# Patient Record
Sex: MALE | Race: WHITE | Employment: OTHER | URBAN - METROPOLITAN AREA
[De-identification: names, ages, dates, MRNs, and addresses within clinical notes are randomized per-mention and may not be internally consistent; named-entity substitution may affect disease eponyms.]

---

## 2017-08-09 PROBLEM — Z79.1 NSAID LONG-TERM USE: Status: ACTIVE | Noted: 2017-08-09

## 2017-08-09 PROBLEM — I34.1 MITRAL VALVE PROLAPSE: Status: ACTIVE | Noted: 2017-08-09

## 2017-08-09 PROBLEM — I10 ESSENTIAL HYPERTENSION: Status: ACTIVE | Noted: 2017-08-09

## 2017-08-09 PROBLEM — R80.0 ISOLATED PROTEINURIA: Status: ACTIVE | Noted: 2017-08-09

## 2017-08-09 PROBLEM — M19.049 PRIMARY OSTEOARTHRITIS OF HAND: Status: ACTIVE | Noted: 2017-08-09

## 2017-08-09 PROBLEM — N18.30 CKD (CHRONIC KIDNEY DISEASE), STAGE III (HCC): Status: ACTIVE | Noted: 2017-08-09

## 2017-08-11 ENCOUNTER — HOSPITAL ENCOUNTER (OUTPATIENT)
Dept: ULTRASOUND IMAGING | Age: 72
Discharge: HOME | End: 2017-08-11
Payer: MEDICARE

## 2017-08-11 ENCOUNTER — HOSPITAL ENCOUNTER (OUTPATIENT)
Age: 72
Discharge: HOME | End: 2017-08-11
Payer: MEDICARE

## 2017-08-11 DIAGNOSIS — N18.30 CKD (CHRONIC KIDNEY DISEASE), STAGE III (HCC): ICD-10-CM

## 2017-08-11 DIAGNOSIS — R53.83 FATIGUE, UNSPECIFIED TYPE: ICD-10-CM

## 2017-08-11 LAB
ALBUMIN SERPL-MCNC: 3.8 G/DL (ref 3.5–5.2)
ANION GAP SERPL CALCULATED.3IONS-SCNC: 12 MMOL/L (ref 9–17)
BUN BLDV-MCNC: 23 MG/DL (ref 8–23)
BUN/CREAT BLD: ABNORMAL (ref 9–20)
CALCIUM SERPL-MCNC: 9.2 MG/DL (ref 8.6–10.4)
CHLORIDE BLD-SCNC: 101 MMOL/L (ref 98–107)
CO2: 24 MMOL/L (ref 20–31)
CREAT SERPL-MCNC: 1.28 MG/DL (ref 0.7–1.2)
CREATININE URINE: 40.3 MG/DL (ref 39–259)
FREE KAPPA/LAMBDA RATIO: 1.78 (ref 0.26–1.65)
GFR AFRICAN AMERICAN: >60 ML/MIN
GFR NON-AFRICAN AMERICAN: 55 ML/MIN
GFR SERPL CREATININE-BSD FRML MDRD: ABNORMAL ML/MIN/{1.73_M2}
GFR SERPL CREATININE-BSD FRML MDRD: ABNORMAL ML/MIN/{1.73_M2}
GLUCOSE BLD-MCNC: 104 MG/DL (ref 70–99)
HAV IGM SER IA-ACNC: NONREACTIVE
HCT VFR BLD CALC: 42.9 % (ref 41–53)
HEMOGLOBIN: 14.3 G/DL (ref 13.5–17.5)
HEPATITIS B CORE IGM ANTIBODY: NONREACTIVE
HEPATITIS B SURFACE ANTIGEN: NONREACTIVE
HEPATITIS C ANTIBODY: NONREACTIVE
KAPPA FREE LIGHT CHAINS QNT: 4.14 MG/DL (ref 0.37–1.94)
LAMBDA FREE LIGHT CHAINS QNT: 2.33 MG/DL (ref 0.57–2.63)
MCH RBC QN AUTO: 31.3 PG (ref 26–34)
MCHC RBC AUTO-ENTMCNC: 33.3 G/DL (ref 31–37)
MCV RBC AUTO: 94 FL (ref 80–100)
PDW BLD-RTO: 13.5 % (ref 11.5–14.9)
PHOSPHORUS: 4.1 MG/DL (ref 2.5–4.5)
PLATELET # BLD: 205 K/UL (ref 150–450)
PMV BLD AUTO: 9.6 FL (ref 6–12)
POTASSIUM SERPL-SCNC: 4.1 MMOL/L (ref 3.7–5.3)
PTH INTACT: 43.2 PG/ML (ref 15–65)
RBC # BLD: 4.56 M/UL (ref 4.5–5.9)
RHEUMATOID FACTOR: <10 IU/ML
SODIUM BLD-SCNC: 137 MMOL/L (ref 135–144)
TOTAL PROTEIN, URINE: 68 MG/DL
WBC # BLD: 8.1 K/UL (ref 3.5–11)

## 2017-08-11 PROCEDURE — 80048 BASIC METABOLIC PNL TOTAL CA: CPT

## 2017-08-11 PROCEDURE — 86038 ANTINUCLEAR ANTIBODIES: CPT

## 2017-08-11 PROCEDURE — 83516 IMMUNOASSAY NONANTIBODY: CPT

## 2017-08-11 PROCEDURE — 82570 ASSAY OF URINE CREATININE: CPT

## 2017-08-11 PROCEDURE — 80074 ACUTE HEPATITIS PANEL: CPT

## 2017-08-11 PROCEDURE — 86431 RHEUMATOID FACTOR QUANT: CPT

## 2017-08-11 PROCEDURE — 85027 COMPLETE CBC AUTOMATED: CPT

## 2017-08-11 PROCEDURE — 84155 ASSAY OF PROTEIN SERUM: CPT

## 2017-08-11 PROCEDURE — 82040 ASSAY OF SERUM ALBUMIN: CPT

## 2017-08-11 PROCEDURE — 84100 ASSAY OF PHOSPHORUS: CPT

## 2017-08-11 PROCEDURE — 83883 ASSAY NEPHELOMETRY NOT SPEC: CPT

## 2017-08-11 PROCEDURE — 84156 ASSAY OF PROTEIN URINE: CPT

## 2017-08-11 PROCEDURE — 76770 US EXAM ABDO BACK WALL COMP: CPT

## 2017-08-11 PROCEDURE — 36415 COLL VENOUS BLD VENIPUNCTURE: CPT

## 2017-08-11 PROCEDURE — 84165 PROTEIN E-PHORESIS SERUM: CPT

## 2017-08-11 PROCEDURE — 83970 ASSAY OF PARATHORMONE: CPT

## 2017-08-14 LAB
ANCA MYELOPEROXIDASE: 72 AU/ML
ANCA PROTEINASE 3: 6 AU/ML
ANTI-NUCLEAR ANTIBODY (ANA): NEGATIVE

## 2017-08-15 LAB
ALBUMIN (CALCULATED): 4.1 G/DL (ref 3.2–5.2)
ALBUMIN PERCENT: 65 % (ref 45–65)
ALPHA 1 PERCENT: 3 % (ref 3–6)
ALPHA 2 PERCENT: 10 % (ref 6–13)
ALPHA-1-GLOBULIN: 0.2 G/DL (ref 0.1–0.4)
ALPHA-2-GLOBULIN: 0.6 G/DL (ref 0.5–0.9)
BETA GLOBULIN: 0.6 G/DL (ref 0.5–1.1)
BETA PERCENT: 10 % (ref 11–19)
GAMMA GLOBULIN %: 11 % (ref 9–20)
GAMMA GLOBULIN: 0.7 G/DL (ref 0.5–1.5)
PATHOLOGIST: ABNORMAL
PROTEIN ELECTROPHORESIS, SERUM: ABNORMAL
TOTAL PROT. SUM,%: 99 % (ref 98–102)
TOTAL PROT. SUM: 6.2 G/DL (ref 6.3–8.2)
TOTAL PROTEIN: 6.2 G/DL (ref 6.4–8.3)

## 2017-11-06 ENCOUNTER — HOSPITAL ENCOUNTER (OUTPATIENT)
Dept: GENERAL RADIOLOGY | Age: 72
Discharge: HOME | End: 2017-11-06
Payer: MEDICARE

## 2017-11-06 ENCOUNTER — HOSPITAL ENCOUNTER (OUTPATIENT)
Age: 72
Discharge: HOME | End: 2017-11-06
Payer: MEDICARE

## 2017-11-06 DIAGNOSIS — S39.012A BACK STRAIN, INITIAL ENCOUNTER: ICD-10-CM

## 2017-11-06 PROCEDURE — 72220 X-RAY EXAM SACRUM TAILBONE: CPT

## 2017-11-06 PROCEDURE — 72100 X-RAY EXAM L-S SPINE 2/3 VWS: CPT

## 2017-11-14 ENCOUNTER — HOSPITAL ENCOUNTER (OUTPATIENT)
Age: 72
Discharge: HOME | End: 2017-11-14
Payer: MEDICARE

## 2017-11-14 DIAGNOSIS — N18.30 CKD (CHRONIC KIDNEY DISEASE), STAGE III (HCC): ICD-10-CM

## 2017-11-14 LAB
ALBUMIN SERPL-MCNC: 3.6 G/DL (ref 3.5–5.2)
ANION GAP SERPL CALCULATED.3IONS-SCNC: 13 MMOL/L (ref 9–17)
BUN BLDV-MCNC: 28 MG/DL (ref 8–23)
BUN/CREAT BLD: ABNORMAL (ref 9–20)
CALCIUM SERPL-MCNC: 9.2 MG/DL (ref 8.6–10.4)
CHLORIDE BLD-SCNC: 101 MMOL/L (ref 98–107)
CO2: 25 MMOL/L (ref 20–31)
CREAT SERPL-MCNC: 1.28 MG/DL (ref 0.7–1.2)
CREATININE URINE: 107 MG/DL (ref 39–259)
GFR AFRICAN AMERICAN: >60 ML/MIN
GFR NON-AFRICAN AMERICAN: 55 ML/MIN
GFR SERPL CREATININE-BSD FRML MDRD: ABNORMAL ML/MIN/{1.73_M2}
GFR SERPL CREATININE-BSD FRML MDRD: ABNORMAL ML/MIN/{1.73_M2}
GLUCOSE BLD-MCNC: 112 MG/DL (ref 70–99)
HCT VFR BLD CALC: 40.9 % (ref 41–53)
HEMOGLOBIN: 13.9 G/DL (ref 13.5–17.5)
MCH RBC QN AUTO: 31.9 PG (ref 26–34)
MCHC RBC AUTO-ENTMCNC: 34 G/DL (ref 31–37)
MCV RBC AUTO: 93.8 FL (ref 80–100)
PDW BLD-RTO: 13.6 % (ref 11.5–14.9)
PLATELET # BLD: 193 K/UL (ref 150–450)
PMV BLD AUTO: 9.4 FL (ref 6–12)
POTASSIUM SERPL-SCNC: 4.5 MMOL/L (ref 3.7–5.3)
RBC # BLD: 4.36 M/UL (ref 4.5–5.9)
SODIUM BLD-SCNC: 139 MMOL/L (ref 135–144)
TOTAL PROTEIN, URINE: 80 MG/DL
WBC # BLD: 9 K/UL (ref 3.5–11)

## 2017-11-14 PROCEDURE — 36415 COLL VENOUS BLD VENIPUNCTURE: CPT

## 2017-11-14 PROCEDURE — 82040 ASSAY OF SERUM ALBUMIN: CPT

## 2017-11-14 PROCEDURE — 80048 BASIC METABOLIC PNL TOTAL CA: CPT

## 2017-11-14 PROCEDURE — 82570 ASSAY OF URINE CREATININE: CPT

## 2017-11-14 PROCEDURE — 84156 ASSAY OF PROTEIN URINE: CPT

## 2017-11-14 PROCEDURE — 85027 COMPLETE CBC AUTOMATED: CPT

## 2017-11-21 ENCOUNTER — OFFICE VISIT (OUTPATIENT)
Dept: UROLOGY | Age: 72
End: 2017-11-21
Payer: MEDICARE

## 2017-11-21 VITALS
WEIGHT: 210.4 LBS | TEMPERATURE: 97.7 F | DIASTOLIC BLOOD PRESSURE: 73 MMHG | HEIGHT: 71 IN | HEART RATE: 71 BPM | SYSTOLIC BLOOD PRESSURE: 124 MMHG | BODY MASS INDEX: 29.46 KG/M2

## 2017-11-21 DIAGNOSIS — N13.8 BPH WITH OBSTRUCTION/LOWER URINARY TRACT SYMPTOMS: ICD-10-CM

## 2017-11-21 DIAGNOSIS — N40.1 BPH WITH OBSTRUCTION/LOWER URINARY TRACT SYMPTOMS: ICD-10-CM

## 2017-11-21 DIAGNOSIS — Z12.5 ENCOUNTER FOR SCREENING FOR MALIGNANT NEOPLASM OF PROSTATE: ICD-10-CM

## 2017-11-21 DIAGNOSIS — R35.1 NOCTURIA: Primary | ICD-10-CM

## 2017-11-21 PROCEDURE — 99213 OFFICE O/P EST LOW 20 MIN: CPT | Performed by: UROLOGY

## 2017-11-21 ASSESSMENT — ENCOUNTER SYMPTOMS
EYE REDNESS: 0
COLOR CHANGE: 0
NAUSEA: 0
SHORTNESS OF BREATH: 0
WHEEZING: 0
BACK PAIN: 0
EYE PAIN: 0
ABDOMINAL PAIN: 0
VOMITING: 0
COUGH: 0

## 2017-11-21 NOTE — PROGRESS NOTES
creatinine:  Lab Results   Component Value Date    BUN 28 (H) 11/14/2017     Lab Results   Component Value Date    CREATININE 1.28 (H) 11/14/2017       Additional Lab/Culture results: none    Imaging Reviewed during this Office Visit: none    (results were independently reviewed by physician and radiology report verified)    PAST MEDICAL, FAMILY AND SOCIAL HISTORY UPDATE:  Past Medical History:   Diagnosis Date    Arthritis     Aspirin long-term use     Back pain     Chronic kidney disease     CKD (chronic kidney disease), stage III 8/9/2017    Baseline creatinine 1.41.5. Proteinuria reported by primary physician. Workup in progress.  Essential hypertension 8/9/2017    Family history of prostate problems     Hiatal hernia     HTN (hypertension)     Isolated proteinuria 8/9/2017    Nonnephrotic, being quantified.     Mitral valve prolapse 8/9/2017    MVP (mitral valve prolapse)     Neck pain     NSAID long-term use 8/9/2017    Primary osteoarthritis of hand 8/9/2017    Urination, excessive at night      Past Surgical History:   Procedure Laterality Date    ANKLE SURGERY      COLONOSCOPY      INGUINAL HERNIA REPAIR  12/1/08    [facility]    LUMBAR [employer] SURGERY      NECK SURGERY      SPINE SURGERY       Family History   Problem Relation Age of Onset    Family history unknown: Yes     Outpatient Prescriptions Marked as Taking for the 11/21/17 encounter (Office Visit) with Suraj Hackett MD   Medication Sig Dispense Refill    spironolactone (ALDACTONE) 25 MG tablet Take 0.5 tablets by mouth daily 90 tablet 3    lisinopril (PRINIVIL;ZESTRIL) 20 MG tablet Take 20 mg by mouth 2 times daily      azelastine (OPTIVAR) 0.05 % ophthalmic solution Place 1 drop into both eyes daily      Turmeric 500 MG CAPS Take by mouth daily      atorvastatin (LIPITOR) 20 MG tablet Take 20 mg by mouth daily      tamsulosin (FLOMAX) 0.4 MG capsule Take 1 capsule by mouth daily 90 capsule 3    aspirin 81 MG tablet obstruction/lower urinary tract symptoms    3. Encounter for screening for malignant neoplasm of prostate           Plan:         Overall, voiding is stable. Restrict fluids before bed. Continue Flomax. F/U in 1 year. Return in about 1 year (around 11/21/2018). Prescriptions Ordered:  No orders of the defined types were placed in this encounter.      Orders Placed:  Orders Placed This Encounter   Procedures    PSA, Diagnostic     Standing Status:   Future     Standing Expiration Date:   11/21/2018          Ed Andrea MD

## 2018-02-05 DIAGNOSIS — R35.1 NOCTURIA: ICD-10-CM

## 2018-02-05 DIAGNOSIS — R35.0 URINARY FREQUENCY: ICD-10-CM

## 2018-02-05 DIAGNOSIS — N40.1 BENIGN NON-NODULAR PROSTATIC HYPERPLASIA WITH LOWER URINARY TRACT SYMPTOMS: ICD-10-CM

## 2018-02-05 RX ORDER — TAMSULOSIN HYDROCHLORIDE 0.4 MG/1
CAPSULE ORAL
Qty: 90 CAPSULE | Refills: 3 | Status: SHIPPED | OUTPATIENT
Start: 2018-02-05 | End: 2019-03-12 | Stop reason: SDUPTHER

## 2018-03-08 ENCOUNTER — HOSPITAL ENCOUNTER (OUTPATIENT)
Age: 73
Discharge: HOME | End: 2018-03-08
Payer: MEDICARE

## 2018-03-08 DIAGNOSIS — N18.30 CKD (CHRONIC KIDNEY DISEASE), STAGE III (HCC): ICD-10-CM

## 2018-03-08 LAB
ALBUMIN SERPL-MCNC: 3.7 G/DL (ref 3.5–5.2)
CREATININE URINE: 84.8 MG/DL (ref 39–259)
HCT VFR BLD CALC: 40.1 % (ref 41–53)
HEMOGLOBIN: 13.6 G/DL (ref 13.5–17.5)
MCH RBC QN AUTO: 32.1 PG (ref 26–34)
MCHC RBC AUTO-ENTMCNC: 33.8 G/DL (ref 31–37)
MCV RBC AUTO: 95.2 FL (ref 80–100)
NRBC AUTOMATED: ABNORMAL PER 100 WBC
PDW BLD-RTO: 13.4 % (ref 11.5–14.9)
PHOSPHORUS: 3.4 MG/DL (ref 2.5–4.5)
PLATELET # BLD: 218 K/UL (ref 150–450)
PMV BLD AUTO: 9.5 FL (ref 6–12)
PTH INTACT: 72.95 PG/ML (ref 15–65)
RBC # BLD: 4.22 M/UL (ref 4.5–5.9)
TOTAL PROTEIN, URINE: 128 MG/DL
WBC # BLD: 10.3 K/UL (ref 3.5–11)

## 2018-03-08 PROCEDURE — 84100 ASSAY OF PHOSPHORUS: CPT

## 2018-03-08 PROCEDURE — 82570 ASSAY OF URINE CREATININE: CPT

## 2018-03-08 PROCEDURE — 83970 ASSAY OF PARATHORMONE: CPT

## 2018-03-08 PROCEDURE — 36415 COLL VENOUS BLD VENIPUNCTURE: CPT

## 2018-03-08 PROCEDURE — 82040 ASSAY OF SERUM ALBUMIN: CPT

## 2018-03-08 PROCEDURE — 84156 ASSAY OF PROTEIN URINE: CPT

## 2018-03-08 PROCEDURE — 85027 COMPLETE CBC AUTOMATED: CPT

## 2018-06-26 ENCOUNTER — HOSPITAL ENCOUNTER (OUTPATIENT)
Age: 73
Discharge: HOME | End: 2018-06-26
Payer: MEDICARE

## 2018-06-26 DIAGNOSIS — N18.30 CKD (CHRONIC KIDNEY DISEASE), STAGE III (HCC): ICD-10-CM

## 2018-06-26 LAB
ALBUMIN SERPL-MCNC: 3.8 G/DL (ref 3.5–5.2)
CREATININE URINE: 107 MG/DL (ref 39–259)
HCT VFR BLD CALC: 41.4 % (ref 41–53)
HEMOGLOBIN: 14 G/DL (ref 13.5–17.5)
MCH RBC QN AUTO: 31.4 PG (ref 26–34)
MCHC RBC AUTO-ENTMCNC: 33.8 G/DL (ref 31–37)
MCV RBC AUTO: 92.9 FL (ref 80–100)
NRBC AUTOMATED: ABNORMAL PER 100 WBC
PDW BLD-RTO: 14.1 % (ref 11.5–14.9)
PHOSPHORUS: 4.1 MG/DL (ref 2.5–4.5)
PLATELET # BLD: 194 K/UL (ref 150–450)
PMV BLD AUTO: 9.4 FL (ref 6–12)
RBC # BLD: 4.46 M/UL (ref 4.5–5.9)
TOTAL PROTEIN, URINE: 130 MG/DL
WBC # BLD: 7.7 K/UL (ref 3.5–11)

## 2018-06-26 PROCEDURE — 85027 COMPLETE CBC AUTOMATED: CPT

## 2018-06-26 PROCEDURE — 83970 ASSAY OF PARATHORMONE: CPT

## 2018-06-26 PROCEDURE — 36415 COLL VENOUS BLD VENIPUNCTURE: CPT

## 2018-06-26 PROCEDURE — 82040 ASSAY OF SERUM ALBUMIN: CPT

## 2018-06-26 PROCEDURE — 84156 ASSAY OF PROTEIN URINE: CPT

## 2018-06-26 PROCEDURE — 82570 ASSAY OF URINE CREATININE: CPT

## 2018-06-26 PROCEDURE — 84100 ASSAY OF PHOSPHORUS: CPT

## 2018-06-27 LAB — PTH INTACT: 53.14 PG/ML (ref 15–65)

## 2018-07-16 ENCOUNTER — HOSPITAL ENCOUNTER (OUTPATIENT)
Age: 73
Discharge: HOME | End: 2018-07-18
Payer: MEDICARE

## 2018-07-16 ENCOUNTER — HOSPITAL ENCOUNTER (OUTPATIENT)
Dept: GENERAL RADIOLOGY | Age: 73
Discharge: HOME | End: 2018-07-18
Payer: MEDICARE

## 2018-07-16 DIAGNOSIS — M13.851: ICD-10-CM

## 2018-07-16 PROCEDURE — 73501 X-RAY EXAM HIP UNI 1 VIEW: CPT

## 2018-08-27 ENCOUNTER — HOSPITAL ENCOUNTER (OUTPATIENT)
Age: 73
Discharge: HOME | End: 2018-08-27
Payer: MEDICARE

## 2018-08-27 DIAGNOSIS — N18.30 CKD (CHRONIC KIDNEY DISEASE), STAGE III (HCC): ICD-10-CM

## 2018-08-27 LAB
ALBUMIN SERPL-MCNC: 3.9 G/DL (ref 3.5–5.2)
CREATININE URINE: 68.7 MG/DL (ref 39–259)
HCT VFR BLD CALC: 41.5 % (ref 41–53)
HEMOGLOBIN: 14.1 G/DL (ref 13.5–17.5)
MCH RBC QN AUTO: 31.6 PG (ref 26–34)
MCHC RBC AUTO-ENTMCNC: 34 G/DL (ref 31–37)
MCV RBC AUTO: 92.9 FL (ref 80–100)
NRBC AUTOMATED: ABNORMAL PER 100 WBC
PDW BLD-RTO: 13.6 % (ref 11.5–14.9)
PHOSPHORUS: 3.6 MG/DL (ref 2.5–4.5)
PLATELET # BLD: 249 K/UL (ref 150–450)
PMV BLD AUTO: 8.7 FL (ref 6–12)
PTH INTACT: 37.64 PG/ML (ref 15–65)
RBC # BLD: 4.47 M/UL (ref 4.5–5.9)
TOTAL PROTEIN, URINE: 96 MG/DL
WBC # BLD: 11 K/UL (ref 3.5–11)

## 2018-08-27 PROCEDURE — 85027 COMPLETE CBC AUTOMATED: CPT

## 2018-08-27 PROCEDURE — 84156 ASSAY OF PROTEIN URINE: CPT

## 2018-08-27 PROCEDURE — 83970 ASSAY OF PARATHORMONE: CPT

## 2018-08-27 PROCEDURE — 36415 COLL VENOUS BLD VENIPUNCTURE: CPT

## 2018-08-27 PROCEDURE — 84100 ASSAY OF PHOSPHORUS: CPT

## 2018-08-27 PROCEDURE — 82570 ASSAY OF URINE CREATININE: CPT

## 2018-08-27 PROCEDURE — 82040 ASSAY OF SERUM ALBUMIN: CPT

## 2018-09-06 ENCOUNTER — HOSPITAL ENCOUNTER (OUTPATIENT)
Age: 73
Discharge: HOME | End: 2018-09-06
Payer: MEDICARE

## 2018-09-06 LAB
ANION GAP SERPL CALCULATED.3IONS-SCNC: 11 MMOL/L (ref 9–17)
BUN BLDV-MCNC: 23 MG/DL (ref 8–23)
BUN/CREAT BLD: ABNORMAL (ref 9–20)
CALCIUM SERPL-MCNC: 9.4 MG/DL (ref 8.6–10.4)
CHLORIDE BLD-SCNC: 105 MMOL/L (ref 98–107)
CO2: 25 MMOL/L (ref 20–31)
CREAT SERPL-MCNC: 1.48 MG/DL (ref 0.7–1.2)
GFR AFRICAN AMERICAN: 56 ML/MIN
GFR NON-AFRICAN AMERICAN: 46 ML/MIN
GFR SERPL CREATININE-BSD FRML MDRD: ABNORMAL ML/MIN/{1.73_M2}
GFR SERPL CREATININE-BSD FRML MDRD: ABNORMAL ML/MIN/{1.73_M2}
GLUCOSE BLD-MCNC: 99 MG/DL (ref 70–99)
POTASSIUM SERPL-SCNC: 4.5 MMOL/L (ref 3.7–5.3)
SODIUM BLD-SCNC: 141 MMOL/L (ref 135–144)

## 2018-09-06 PROCEDURE — 80048 BASIC METABOLIC PNL TOTAL CA: CPT

## 2018-10-26 ENCOUNTER — HOSPITAL ENCOUNTER (OUTPATIENT)
Age: 73
Discharge: HOME | End: 2018-10-26
Payer: MEDICARE

## 2018-10-26 DIAGNOSIS — N18.30 CKD (CHRONIC KIDNEY DISEASE), STAGE III (HCC): ICD-10-CM

## 2018-10-26 LAB
ALBUMIN SERPL-MCNC: 3.6 G/DL (ref 3.5–5.2)
ANION GAP SERPL CALCULATED.3IONS-SCNC: 9 MMOL/L (ref 9–17)
BUN BLDV-MCNC: 20 MG/DL (ref 8–23)
BUN/CREAT BLD: ABNORMAL (ref 9–20)
CALCIUM SERPL-MCNC: 9 MG/DL (ref 8.6–10.4)
CHLORIDE BLD-SCNC: 105 MMOL/L (ref 98–107)
CO2: 26 MMOL/L (ref 20–31)
CREAT SERPL-MCNC: 1.36 MG/DL (ref 0.7–1.2)
CREATININE URINE: 43 MG/DL (ref 39–259)
GFR AFRICAN AMERICAN: >60 ML/MIN
GFR NON-AFRICAN AMERICAN: 51 ML/MIN
GFR SERPL CREATININE-BSD FRML MDRD: ABNORMAL ML/MIN/{1.73_M2}
GFR SERPL CREATININE-BSD FRML MDRD: ABNORMAL ML/MIN/{1.73_M2}
GLUCOSE BLD-MCNC: 117 MG/DL (ref 70–99)
HCT VFR BLD CALC: 39.3 % (ref 41–53)
HEMOGLOBIN: 12.9 G/DL (ref 13.5–17.5)
MCH RBC QN AUTO: 30.6 PG (ref 26–34)
MCHC RBC AUTO-ENTMCNC: 32.8 G/DL (ref 31–37)
MCV RBC AUTO: 93.4 FL (ref 80–100)
NRBC AUTOMATED: ABNORMAL PER 100 WBC
PDW BLD-RTO: 13.5 % (ref 11.5–14.9)
PHOSPHORUS: 3.8 MG/DL (ref 2.5–4.5)
PLATELET # BLD: 198 K/UL (ref 150–450)
PMV BLD AUTO: 8.7 FL (ref 6–12)
POTASSIUM SERPL-SCNC: 4.6 MMOL/L (ref 3.7–5.3)
PTH INTACT: 63.79 PG/ML (ref 15–65)
RBC # BLD: 4.21 M/UL (ref 4.5–5.9)
SODIUM BLD-SCNC: 140 MMOL/L (ref 135–144)
TOTAL PROTEIN, URINE: 59 MG/DL
WBC # BLD: 8.7 K/UL (ref 3.5–11)

## 2018-10-26 PROCEDURE — 82570 ASSAY OF URINE CREATININE: CPT

## 2018-10-26 PROCEDURE — 80048 BASIC METABOLIC PNL TOTAL CA: CPT

## 2018-10-26 PROCEDURE — 82040 ASSAY OF SERUM ALBUMIN: CPT

## 2018-10-26 PROCEDURE — 84156 ASSAY OF PROTEIN URINE: CPT

## 2018-10-26 PROCEDURE — 83970 ASSAY OF PARATHORMONE: CPT

## 2018-10-26 PROCEDURE — 36415 COLL VENOUS BLD VENIPUNCTURE: CPT

## 2018-10-26 PROCEDURE — 85027 COMPLETE CBC AUTOMATED: CPT

## 2018-10-26 PROCEDURE — 84100 ASSAY OF PHOSPHORUS: CPT

## 2018-11-29 ENCOUNTER — HOSPITAL ENCOUNTER (OUTPATIENT)
Age: 73
Discharge: HOME | End: 2018-11-29
Payer: MEDICARE

## 2018-11-29 DIAGNOSIS — N18.30 CKD (CHRONIC KIDNEY DISEASE), STAGE III (HCC): ICD-10-CM

## 2018-11-29 LAB
ALBUMIN SERPL-MCNC: 3.7 G/DL (ref 3.5–5.2)
ANION GAP SERPL CALCULATED.3IONS-SCNC: 11 MMOL/L (ref 9–17)
BUN BLDV-MCNC: 19 MG/DL (ref 8–23)
BUN/CREAT BLD: ABNORMAL (ref 9–20)
CALCIUM SERPL-MCNC: 9.1 MG/DL (ref 8.6–10.4)
CHLORIDE BLD-SCNC: 107 MMOL/L (ref 98–107)
CO2: 25 MMOL/L (ref 20–31)
CREAT SERPL-MCNC: 1.43 MG/DL (ref 0.7–1.2)
CREATININE URINE: 88.8 MG/DL (ref 39–259)
GFR AFRICAN AMERICAN: 59 ML/MIN
GFR NON-AFRICAN AMERICAN: 48 ML/MIN
GFR SERPL CREATININE-BSD FRML MDRD: ABNORMAL ML/MIN/{1.73_M2}
GFR SERPL CREATININE-BSD FRML MDRD: ABNORMAL ML/MIN/{1.73_M2}
GLUCOSE BLD-MCNC: 81 MG/DL (ref 70–99)
HCT VFR BLD CALC: 41.6 % (ref 41–53)
HEMOGLOBIN: 13.8 G/DL (ref 13.5–17.5)
MCH RBC QN AUTO: 31.2 PG (ref 26–34)
MCHC RBC AUTO-ENTMCNC: 33.2 G/DL (ref 31–37)
MCV RBC AUTO: 94.2 FL (ref 80–100)
NRBC AUTOMATED: ABNORMAL PER 100 WBC
PDW BLD-RTO: 13.7 % (ref 11.5–14.9)
PHOSPHORUS: 3.7 MG/DL (ref 2.5–4.5)
PLATELET # BLD: 221 K/UL (ref 150–450)
PMV BLD AUTO: 9.5 FL (ref 6–12)
POTASSIUM SERPL-SCNC: 4.8 MMOL/L (ref 3.7–5.3)
PTH INTACT: 99.77 PG/ML (ref 15–65)
RBC # BLD: 4.42 M/UL (ref 4.5–5.9)
SODIUM BLD-SCNC: 143 MMOL/L (ref 135–144)
TOTAL PROTEIN, URINE: 136 MG/DL
WBC # BLD: 10.6 K/UL (ref 3.5–11)

## 2018-11-29 PROCEDURE — 36415 COLL VENOUS BLD VENIPUNCTURE: CPT

## 2018-11-29 PROCEDURE — 83970 ASSAY OF PARATHORMONE: CPT

## 2018-11-29 PROCEDURE — 80048 BASIC METABOLIC PNL TOTAL CA: CPT

## 2018-11-29 PROCEDURE — 84156 ASSAY OF PROTEIN URINE: CPT

## 2018-11-29 PROCEDURE — 84100 ASSAY OF PHOSPHORUS: CPT

## 2018-11-29 PROCEDURE — 82040 ASSAY OF SERUM ALBUMIN: CPT

## 2018-11-29 PROCEDURE — 82570 ASSAY OF URINE CREATININE: CPT

## 2018-11-29 PROCEDURE — 85027 COMPLETE CBC AUTOMATED: CPT

## 2018-12-27 ENCOUNTER — HOSPITAL ENCOUNTER (OUTPATIENT)
Age: 73
Discharge: HOME | End: 2018-12-27
Payer: MEDICARE

## 2018-12-27 DIAGNOSIS — N18.30 CKD (CHRONIC KIDNEY DISEASE), STAGE III (HCC): ICD-10-CM

## 2018-12-27 LAB
ALBUMIN SERPL-MCNC: 3.6 G/DL (ref 3.5–5.2)
ANION GAP SERPL CALCULATED.3IONS-SCNC: 8 MMOL/L (ref 9–17)
BUN BLDV-MCNC: 22 MG/DL (ref 8–23)
BUN/CREAT BLD: ABNORMAL (ref 9–20)
CALCIUM SERPL-MCNC: 9 MG/DL (ref 8.6–10.4)
CHLORIDE BLD-SCNC: 103 MMOL/L (ref 98–107)
CO2: 25 MMOL/L (ref 20–31)
CREAT SERPL-MCNC: 1.38 MG/DL (ref 0.7–1.2)
CREATININE URINE: 33.4 MG/DL (ref 39–259)
GFR AFRICAN AMERICAN: >60 ML/MIN
GFR NON-AFRICAN AMERICAN: 50 ML/MIN
GFR SERPL CREATININE-BSD FRML MDRD: ABNORMAL ML/MIN/{1.73_M2}
GFR SERPL CREATININE-BSD FRML MDRD: ABNORMAL ML/MIN/{1.73_M2}
GLUCOSE BLD-MCNC: 78 MG/DL (ref 70–99)
HCT VFR BLD CALC: 40.5 % (ref 41–53)
HEMOGLOBIN: 13.4 G/DL (ref 13.5–17.5)
MCH RBC QN AUTO: 31 PG (ref 26–34)
MCHC RBC AUTO-ENTMCNC: 33.1 G/DL (ref 31–37)
MCV RBC AUTO: 93.8 FL (ref 80–100)
NRBC AUTOMATED: ABNORMAL PER 100 WBC
PDW BLD-RTO: 13.5 % (ref 11.5–14.9)
PHOSPHORUS: 4.1 MG/DL (ref 2.5–4.5)
PLATELET # BLD: 224 K/UL (ref 150–450)
PMV BLD AUTO: 9 FL (ref 6–12)
POTASSIUM SERPL-SCNC: 4.9 MMOL/L (ref 3.7–5.3)
PTH INTACT: 60.53 PG/ML (ref 15–65)
RBC # BLD: 4.32 M/UL (ref 4.5–5.9)
SODIUM BLD-SCNC: 136 MMOL/L (ref 135–144)
TOTAL PROTEIN, URINE: 43 MG/DL
WBC # BLD: 8.7 K/UL (ref 3.5–11)

## 2018-12-27 PROCEDURE — 84156 ASSAY OF PROTEIN URINE: CPT

## 2018-12-27 PROCEDURE — 80048 BASIC METABOLIC PNL TOTAL CA: CPT

## 2018-12-27 PROCEDURE — 83970 ASSAY OF PARATHORMONE: CPT

## 2018-12-27 PROCEDURE — 36415 COLL VENOUS BLD VENIPUNCTURE: CPT

## 2018-12-27 PROCEDURE — 85027 COMPLETE CBC AUTOMATED: CPT

## 2018-12-27 PROCEDURE — 82570 ASSAY OF URINE CREATININE: CPT

## 2018-12-27 PROCEDURE — 82040 ASSAY OF SERUM ALBUMIN: CPT

## 2018-12-27 PROCEDURE — 84100 ASSAY OF PHOSPHORUS: CPT

## 2019-01-21 ENCOUNTER — HOSPITAL ENCOUNTER (OUTPATIENT)
Age: 74
Discharge: HOME | End: 2019-01-21
Payer: MEDICARE

## 2019-01-21 DIAGNOSIS — N40.0 BENIGN PROSTATIC HYPERPLASIA WITHOUT LOWER URINARY TRACT SYMPTOMS: ICD-10-CM

## 2019-01-21 DIAGNOSIS — N40.0 BENIGN PROSTATIC HYPERPLASIA WITHOUT LOWER URINARY TRACT SYMPTOMS: Primary | ICD-10-CM

## 2019-01-21 LAB — PROSTATE SPECIFIC ANTIGEN: 1.33 UG/L

## 2019-01-21 PROCEDURE — 84153 ASSAY OF PSA TOTAL: CPT

## 2019-01-21 PROCEDURE — 36415 COLL VENOUS BLD VENIPUNCTURE: CPT

## 2019-01-22 ENCOUNTER — OFFICE VISIT (OUTPATIENT)
Dept: UROLOGY | Age: 74
End: 2019-01-22
Payer: MEDICARE

## 2019-01-22 VITALS
TEMPERATURE: 97.7 F | WEIGHT: 224.6 LBS | BODY MASS INDEX: 31.44 KG/M2 | SYSTOLIC BLOOD PRESSURE: 147 MMHG | HEIGHT: 71 IN | DIASTOLIC BLOOD PRESSURE: 88 MMHG | HEART RATE: 62 BPM

## 2019-01-22 DIAGNOSIS — N40.1 BPH WITH OBSTRUCTION/LOWER URINARY TRACT SYMPTOMS: ICD-10-CM

## 2019-01-22 DIAGNOSIS — Z12.5 PROSTATE CANCER SCREENING: ICD-10-CM

## 2019-01-22 DIAGNOSIS — N13.8 BPH WITH OBSTRUCTION/LOWER URINARY TRACT SYMPTOMS: ICD-10-CM

## 2019-01-22 DIAGNOSIS — R35.1 NOCTURIA: Primary | ICD-10-CM

## 2019-01-22 PROCEDURE — 99214 OFFICE O/P EST MOD 30 MIN: CPT | Performed by: UROLOGY

## 2019-01-22 ASSESSMENT — ENCOUNTER SYMPTOMS
COLOR CHANGE: 0
COUGH: 0
VOMITING: 0
ABDOMINAL PAIN: 0
NAUSEA: 0
EYE PAIN: 0
EYE REDNESS: 0
SHORTNESS OF BREATH: 0
BACK PAIN: 0
WHEEZING: 0

## 2019-02-24 ENCOUNTER — HOSPITAL ENCOUNTER (EMERGENCY)
Age: 74
Discharge: HOME | End: 2019-02-24
Attending: EMERGENCY MEDICINE
Payer: MEDICARE

## 2019-02-24 ENCOUNTER — APPOINTMENT (OUTPATIENT)
Dept: GENERAL RADIOLOGY | Age: 74
End: 2019-02-24
Payer: MEDICARE

## 2019-02-24 VITALS
TEMPERATURE: 97.6 F | HEIGHT: 71 IN | DIASTOLIC BLOOD PRESSURE: 70 MMHG | WEIGHT: 220 LBS | HEART RATE: 62 BPM | BODY MASS INDEX: 30.8 KG/M2 | SYSTOLIC BLOOD PRESSURE: 153 MMHG | OXYGEN SATURATION: 95 % | RESPIRATION RATE: 20 BRPM

## 2019-02-24 DIAGNOSIS — S93.401A SPRAIN OF RIGHT ANKLE, UNSPECIFIED LIGAMENT, INITIAL ENCOUNTER: Primary | ICD-10-CM

## 2019-02-24 PROCEDURE — 73610 X-RAY EXAM OF ANKLE: CPT

## 2019-02-24 PROCEDURE — 6370000000 HC RX 637 (ALT 250 FOR IP): Performed by: EMERGENCY MEDICINE

## 2019-02-24 PROCEDURE — 99283 EMERGENCY DEPT VISIT LOW MDM: CPT

## 2019-02-24 RX ORDER — IBUPROFEN 600 MG/1
600 TABLET ORAL ONCE
Status: COMPLETED | OUTPATIENT
Start: 2019-02-24 | End: 2019-02-24

## 2019-02-24 RX ADMIN — IBUPROFEN 600 MG: 600 TABLET, FILM COATED ORAL at 09:59

## 2019-02-24 ASSESSMENT — PAIN DESCRIPTION - LOCATION: LOCATION: FOOT

## 2019-02-24 ASSESSMENT — PAIN DESCRIPTION - PAIN TYPE: TYPE: ACUTE PAIN

## 2019-02-24 ASSESSMENT — PAIN DESCRIPTION - ORIENTATION: ORIENTATION: RIGHT

## 2019-02-24 ASSESSMENT — PAIN SCALES - GENERAL: PAINLEVEL_OUTOF10: 5

## 2019-03-12 DIAGNOSIS — R35.0 URINARY FREQUENCY: ICD-10-CM

## 2019-03-12 DIAGNOSIS — N40.1 BENIGN NON-NODULAR PROSTATIC HYPERPLASIA WITH LOWER URINARY TRACT SYMPTOMS: ICD-10-CM

## 2019-03-12 DIAGNOSIS — R35.1 NOCTURIA: ICD-10-CM

## 2019-03-14 RX ORDER — TAMSULOSIN HYDROCHLORIDE 0.4 MG/1
CAPSULE ORAL
Qty: 90 CAPSULE | Refills: 3 | Status: SHIPPED | OUTPATIENT
Start: 2019-03-14

## 2019-06-21 ENCOUNTER — HOSPITAL ENCOUNTER (OUTPATIENT)
Age: 74
Discharge: HOME | End: 2019-06-21
Payer: MEDICARE

## 2019-06-21 DIAGNOSIS — Z12.5 PROSTATE CANCER SCREENING: ICD-10-CM

## 2019-06-21 DIAGNOSIS — N18.30 CKD (CHRONIC KIDNEY DISEASE), STAGE III (HCC): ICD-10-CM

## 2019-06-21 LAB
ALBUMIN SERPL-MCNC: 3.6 G/DL (ref 3.5–5.2)
ALT SERPL-CCNC: 9 U/L (ref 5–41)
ANION GAP SERPL CALCULATED.3IONS-SCNC: 11 MMOL/L (ref 9–17)
AST SERPL-CCNC: 16 U/L
BUN BLDV-MCNC: 26 MG/DL (ref 8–23)
BUN/CREAT BLD: ABNORMAL (ref 9–20)
CALCIUM SERPL-MCNC: 9 MG/DL (ref 8.6–10.4)
CHLORIDE BLD-SCNC: 107 MMOL/L (ref 98–107)
CO2: 22 MMOL/L (ref 20–31)
CREAT SERPL-MCNC: 1.5 MG/DL (ref 0.7–1.2)
CREATININE URINE: 103 MG/DL (ref 39–259)
GFR AFRICAN AMERICAN: 55 ML/MIN
GFR NON-AFRICAN AMERICAN: 46 ML/MIN
GFR SERPL CREATININE-BSD FRML MDRD: ABNORMAL ML/MIN/{1.73_M2}
GFR SERPL CREATININE-BSD FRML MDRD: ABNORMAL ML/MIN/{1.73_M2}
GLUCOSE BLD-MCNC: 123 MG/DL (ref 70–99)
HCT VFR BLD CALC: 39.4 % (ref 41–53)
HEMOGLOBIN: 13.4 G/DL (ref 13.5–17.5)
MCH RBC QN AUTO: 31.4 PG (ref 26–34)
MCHC RBC AUTO-ENTMCNC: 33.9 G/DL (ref 31–37)
MCV RBC AUTO: 92.4 FL (ref 80–100)
NRBC AUTOMATED: ABNORMAL PER 100 WBC
PDW BLD-RTO: 14.3 % (ref 11.5–14.9)
PHOSPHORUS: 3.8 MG/DL (ref 2.5–4.5)
PLATELET # BLD: 200 K/UL (ref 150–450)
PMV BLD AUTO: 8.9 FL (ref 6–12)
POTASSIUM SERPL-SCNC: 4.4 MMOL/L (ref 3.7–5.3)
PROSTATE SPECIFIC ANTIGEN: 1.87 UG/L
PTH INTACT: 47.74 PG/ML (ref 15–65)
RBC # BLD: 4.27 M/UL (ref 4.5–5.9)
SODIUM BLD-SCNC: 140 MMOL/L (ref 135–144)
TOTAL PROTEIN, URINE: 110 MG/DL
URIC ACID: 5.4 MG/DL (ref 3.4–7)
WBC # BLD: 8.7 K/UL (ref 3.5–11)

## 2019-06-21 PROCEDURE — 85027 COMPLETE CBC AUTOMATED: CPT

## 2019-06-21 PROCEDURE — 84450 TRANSFERASE (AST) (SGOT): CPT

## 2019-06-21 PROCEDURE — 82040 ASSAY OF SERUM ALBUMIN: CPT

## 2019-06-21 PROCEDURE — 80048 BASIC METABOLIC PNL TOTAL CA: CPT

## 2019-06-21 PROCEDURE — 83970 ASSAY OF PARATHORMONE: CPT

## 2019-06-21 PROCEDURE — 84100 ASSAY OF PHOSPHORUS: CPT

## 2019-06-21 PROCEDURE — 84550 ASSAY OF BLOOD/URIC ACID: CPT

## 2019-06-21 PROCEDURE — 36415 COLL VENOUS BLD VENIPUNCTURE: CPT

## 2019-06-21 PROCEDURE — 84153 ASSAY OF PSA TOTAL: CPT

## 2019-06-21 PROCEDURE — 84156 ASSAY OF PROTEIN URINE: CPT

## 2019-06-21 PROCEDURE — 84460 ALANINE AMINO (ALT) (SGPT): CPT

## 2019-06-21 PROCEDURE — 82570 ASSAY OF URINE CREATININE: CPT

## 2019-09-27 ENCOUNTER — HOSPITAL ENCOUNTER (OUTPATIENT)
Age: 74
Discharge: HOME | End: 2019-09-27
Payer: MEDICARE

## 2019-09-27 DIAGNOSIS — N18.30 CKD (CHRONIC KIDNEY DISEASE), STAGE III (HCC): ICD-10-CM

## 2019-09-27 LAB
ALBUMIN SERPL-MCNC: 3.9 G/DL (ref 3.5–5.2)
ANION GAP SERPL CALCULATED.3IONS-SCNC: 11 MMOL/L (ref 9–17)
BUN BLDV-MCNC: 21 MG/DL (ref 8–23)
BUN/CREAT BLD: ABNORMAL (ref 9–20)
CALCIUM SERPL-MCNC: 9.2 MG/DL (ref 8.6–10.4)
CHLORIDE BLD-SCNC: 108 MMOL/L (ref 98–107)
CO2: 22 MMOL/L (ref 20–31)
CREAT SERPL-MCNC: 1.45 MG/DL (ref 0.7–1.2)
CREATININE URINE: 29.2 MG/DL (ref 39–259)
GFR AFRICAN AMERICAN: 58 ML/MIN
GFR NON-AFRICAN AMERICAN: 47 ML/MIN
GFR SERPL CREATININE-BSD FRML MDRD: ABNORMAL ML/MIN/{1.73_M2}
GFR SERPL CREATININE-BSD FRML MDRD: ABNORMAL ML/MIN/{1.73_M2}
GLUCOSE BLD-MCNC: 80 MG/DL (ref 70–99)
HCT VFR BLD CALC: 41.8 % (ref 41–53)
HEMOGLOBIN: 14.2 G/DL (ref 13.5–17.5)
MCH RBC QN AUTO: 31.5 PG (ref 26–34)
MCHC RBC AUTO-ENTMCNC: 34 G/DL (ref 31–37)
MCV RBC AUTO: 92.7 FL (ref 80–100)
NRBC AUTOMATED: NORMAL PER 100 WBC
PDW BLD-RTO: 13.8 % (ref 11.5–14.9)
PHOSPHORUS: 3.6 MG/DL (ref 2.5–4.5)
PLATELET # BLD: 224 K/UL (ref 150–450)
PMV BLD AUTO: 9 FL (ref 6–12)
POTASSIUM SERPL-SCNC: 4.6 MMOL/L (ref 3.7–5.3)
PTH INTACT: 60.94 PG/ML (ref 15–65)
RBC # BLD: 4.51 M/UL (ref 4.5–5.9)
SODIUM BLD-SCNC: 141 MMOL/L (ref 135–144)
TOTAL PROTEIN, URINE: 60 MG/DL
WBC # BLD: 8 K/UL (ref 3.5–11)

## 2019-09-27 PROCEDURE — 82570 ASSAY OF URINE CREATININE: CPT

## 2019-09-27 PROCEDURE — 83970 ASSAY OF PARATHORMONE: CPT

## 2019-09-27 PROCEDURE — 84100 ASSAY OF PHOSPHORUS: CPT

## 2019-09-27 PROCEDURE — 80048 BASIC METABOLIC PNL TOTAL CA: CPT

## 2019-09-27 PROCEDURE — 36415 COLL VENOUS BLD VENIPUNCTURE: CPT

## 2019-09-27 PROCEDURE — 82040 ASSAY OF SERUM ALBUMIN: CPT

## 2019-09-27 PROCEDURE — 84156 ASSAY OF PROTEIN URINE: CPT

## 2019-09-27 PROCEDURE — 85027 COMPLETE CBC AUTOMATED: CPT

## 2019-11-07 ENCOUNTER — HOSPITAL ENCOUNTER (OUTPATIENT)
Age: 74
Discharge: HOME | End: 2019-11-07
Payer: MEDICARE

## 2019-11-07 DIAGNOSIS — N18.30 CKD (CHRONIC KIDNEY DISEASE), STAGE III (HCC): ICD-10-CM

## 2019-11-07 LAB
ALBUMIN SERPL-MCNC: 3.9 G/DL (ref 3.5–5.2)
ANION GAP SERPL CALCULATED.3IONS-SCNC: 11 MMOL/L (ref 9–17)
BUN BLDV-MCNC: 23 MG/DL (ref 8–23)
BUN/CREAT BLD: ABNORMAL (ref 9–20)
CALCIUM SERPL-MCNC: 9.7 MG/DL (ref 8.6–10.4)
CHLORIDE BLD-SCNC: 104 MMOL/L (ref 98–107)
CO2: 23 MMOL/L (ref 20–31)
CREAT SERPL-MCNC: 1.52 MG/DL (ref 0.7–1.2)
CREATININE URINE: 145.9 MG/DL (ref 39–259)
GFR AFRICAN AMERICAN: 54 ML/MIN
GFR NON-AFRICAN AMERICAN: 45 ML/MIN
GFR SERPL CREATININE-BSD FRML MDRD: ABNORMAL ML/MIN/{1.73_M2}
GFR SERPL CREATININE-BSD FRML MDRD: ABNORMAL ML/MIN/{1.73_M2}
GLUCOSE BLD-MCNC: 94 MG/DL (ref 70–99)
HCT VFR BLD CALC: 42.7 % (ref 41–53)
HEMOGLOBIN: 14.3 G/DL (ref 13.5–17.5)
MCH RBC QN AUTO: 31.1 PG (ref 26–34)
MCHC RBC AUTO-ENTMCNC: 33.5 G/DL (ref 31–37)
MCV RBC AUTO: 93.1 FL (ref 80–100)
NRBC AUTOMATED: NORMAL PER 100 WBC
PDW BLD-RTO: 13.7 % (ref 11.5–14.9)
PHOSPHORUS: 3.5 MG/DL (ref 2.5–4.5)
PLATELET # BLD: 214 K/UL (ref 150–450)
PMV BLD AUTO: 8.7 FL (ref 6–12)
POTASSIUM SERPL-SCNC: 4.6 MMOL/L (ref 3.7–5.3)
PTH INTACT: 51.08 PG/ML (ref 15–65)
RBC # BLD: 4.59 M/UL (ref 4.5–5.9)
SODIUM BLD-SCNC: 138 MMOL/L (ref 135–144)
TOTAL PROTEIN, URINE: 290 MG/DL
WBC # BLD: 9.7 K/UL (ref 3.5–11)

## 2019-11-07 PROCEDURE — 80048 BASIC METABOLIC PNL TOTAL CA: CPT

## 2019-11-07 PROCEDURE — 82570 ASSAY OF URINE CREATININE: CPT

## 2019-11-07 PROCEDURE — 84100 ASSAY OF PHOSPHORUS: CPT

## 2019-11-07 PROCEDURE — 36415 COLL VENOUS BLD VENIPUNCTURE: CPT

## 2019-11-07 PROCEDURE — 85027 COMPLETE CBC AUTOMATED: CPT

## 2019-11-07 PROCEDURE — 83970 ASSAY OF PARATHORMONE: CPT

## 2019-11-07 PROCEDURE — 82040 ASSAY OF SERUM ALBUMIN: CPT

## 2019-11-07 PROCEDURE — 84156 ASSAY OF PROTEIN URINE: CPT

## 2019-11-29 ENCOUNTER — HOSPITAL ENCOUNTER (OUTPATIENT)
Age: 74
Discharge: HOME | End: 2019-11-29
Payer: MEDICARE

## 2019-11-29 DIAGNOSIS — N18.30 CKD (CHRONIC KIDNEY DISEASE), STAGE III (HCC): ICD-10-CM

## 2019-11-29 LAB
ALBUMIN SERPL-MCNC: 3.5 G/DL (ref 3.5–5.2)
ANION GAP SERPL CALCULATED.3IONS-SCNC: 13 MMOL/L (ref 9–17)
BUN BLDV-MCNC: 22 MG/DL (ref 8–23)
BUN/CREAT BLD: ABNORMAL (ref 9–20)
CALCIUM SERPL-MCNC: 9.3 MG/DL (ref 8.6–10.4)
CHLORIDE BLD-SCNC: 106 MMOL/L (ref 98–107)
CO2: 24 MMOL/L (ref 20–31)
CREAT SERPL-MCNC: 1.66 MG/DL (ref 0.7–1.2)
CREATININE URINE: 176.9 MG/DL (ref 39–259)
GFR AFRICAN AMERICAN: 49 ML/MIN
GFR NON-AFRICAN AMERICAN: 41 ML/MIN
GFR SERPL CREATININE-BSD FRML MDRD: ABNORMAL ML/MIN/{1.73_M2}
GFR SERPL CREATININE-BSD FRML MDRD: ABNORMAL ML/MIN/{1.73_M2}
GLUCOSE BLD-MCNC: 85 MG/DL (ref 70–99)
HCT VFR BLD CALC: 39.3 % (ref 41–53)
HEMOGLOBIN: 13.3 G/DL (ref 13.5–17.5)
MCH RBC QN AUTO: 31.1 PG (ref 26–34)
MCHC RBC AUTO-ENTMCNC: 33.8 G/DL (ref 31–37)
MCV RBC AUTO: 92 FL (ref 80–100)
NRBC AUTOMATED: ABNORMAL PER 100 WBC
PDW BLD-RTO: 13.8 % (ref 11.5–14.9)
PHOSPHORUS: 3.8 MG/DL (ref 2.5–4.5)
PLATELET # BLD: 242 K/UL (ref 150–450)
PMV BLD AUTO: 8.8 FL (ref 6–12)
POTASSIUM SERPL-SCNC: 4.6 MMOL/L (ref 3.7–5.3)
PTH INTACT: 52.71 PG/ML (ref 15–65)
RBC # BLD: 4.28 M/UL (ref 4.5–5.9)
SODIUM BLD-SCNC: 143 MMOL/L (ref 135–144)
TOTAL PROTEIN, URINE: 286 MG/DL
WBC # BLD: 9.2 K/UL (ref 3.5–11)

## 2019-11-29 PROCEDURE — 84100 ASSAY OF PHOSPHORUS: CPT

## 2019-11-29 PROCEDURE — 36415 COLL VENOUS BLD VENIPUNCTURE: CPT

## 2019-11-29 PROCEDURE — 82040 ASSAY OF SERUM ALBUMIN: CPT

## 2019-11-29 PROCEDURE — 83970 ASSAY OF PARATHORMONE: CPT

## 2019-11-29 PROCEDURE — 82570 ASSAY OF URINE CREATININE: CPT

## 2019-11-29 PROCEDURE — 80048 BASIC METABOLIC PNL TOTAL CA: CPT

## 2019-11-29 PROCEDURE — 84156 ASSAY OF PROTEIN URINE: CPT

## 2019-11-29 PROCEDURE — 85027 COMPLETE CBC AUTOMATED: CPT

## 2019-12-10 ENCOUNTER — HOSPITAL ENCOUNTER (EMERGENCY)
Age: 74
Discharge: HOME | End: 2019-12-10
Attending: EMERGENCY MEDICINE
Payer: MEDICARE

## 2019-12-10 ENCOUNTER — APPOINTMENT (OUTPATIENT)
Dept: GENERAL RADIOLOGY | Age: 74
End: 2019-12-10
Payer: MEDICARE

## 2019-12-10 VITALS
SYSTOLIC BLOOD PRESSURE: 190 MMHG | HEART RATE: 63 BPM | DIASTOLIC BLOOD PRESSURE: 92 MMHG | RESPIRATION RATE: 16 BRPM | WEIGHT: 220 LBS | TEMPERATURE: 97.8 F | OXYGEN SATURATION: 98 % | BODY MASS INDEX: 30.68 KG/M2
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
## 2019-12-10 DIAGNOSIS — S63.501A SPRAIN OF RIGHT WRIST, INITIAL ENCOUNTER: ICD-10-CM

## 2019-12-10 PROCEDURE — 73110 X-RAY EXAM OF WRIST: CPT

## 2019-12-10 PROCEDURE — 99284 EMERGENCY DEPT VISIT MOD MDM: CPT

## 2019-12-10 PROCEDURE — 6370000000 HC RX 637 (ALT 250 FOR IP): Performed by: NURSE PRACTITIONER

## 2019-12-10 RX ORDER — ACETAMINOPHEN 325 MG/1
650 TABLET ORAL ONCE
Status: COMPLETED | OUTPATIENT
Start: 2019-12-10 | End: 2019-12-10

## 2019-12-10 RX ADMIN — ACETAMINOPHEN 650 MG: 325 TABLET, FILM COATED ORAL at 10:08

## 2019-12-10 ASSESSMENT — PAIN DESCRIPTION - PAIN TYPE: TYPE: ACUTE PAIN

## 2019-12-10 ASSESSMENT — ENCOUNTER SYMPTOMS
VOMITING: 0
TROUBLE SWALLOWING: 0
SHORTNESS OF BREATH: 0
NAUSEA: 0
COUGH: 0

## 2019-12-10 ASSESSMENT — PAIN DESCRIPTION - ORIENTATION: ORIENTATION: RIGHT

## 2019-12-10 ASSESSMENT — PAIN DESCRIPTION - LOCATION: LOCATION: WRIST

## 2019-12-10 ASSESSMENT — PAIN SCALES - GENERAL: PAINLEVEL_OUTOF10: 5

## 2019-12-12 ENCOUNTER — OFFICE VISIT (OUTPATIENT)
Dept: ORTHOPEDIC SURGERY | Age: 74
End: 2019-12-12
Payer: MEDICARE

## 2019-12-12 VITALS — BODY MASS INDEX: 30.94 KG/M2 | HEIGHT: 71 IN | WEIGHT: 221 LBS

## 2019-12-12 DIAGNOSIS — S63.501D SPRAIN OF RIGHT WRIST, SUBSEQUENT ENCOUNTER: Primary | ICD-10-CM

## 2019-12-12 PROCEDURE — 99203 OFFICE O/P NEW LOW 30 MIN: CPT | Performed by: PHYSICIAN ASSISTANT

## 2019-12-12 ASSESSMENT — ENCOUNTER SYMPTOMS
NAUSEA: 0
COLOR CHANGE: 0
SORE THROAT: 0
SHORTNESS OF BREATH: 0
CHEST TIGHTNESS: 0
EYE REDNESS: 0
VOMITING: 0
EYE PAIN: 0
RHINORRHEA: 0

## 2020-02-18 ENCOUNTER — HOSPITAL ENCOUNTER (OUTPATIENT)
Age: 75
Discharge: HOME | End: 2020-02-18
Payer: MEDICARE

## 2020-02-18 ENCOUNTER — TELEPHONE (OUTPATIENT)
Dept: UROLOGY | Age: 75
End: 2020-02-18

## 2020-02-18 LAB — PROSTATE SPECIFIC ANTIGEN: 1.55 UG/L

## 2020-02-18 PROCEDURE — 36415 COLL VENOUS BLD VENIPUNCTURE: CPT

## 2020-02-18 PROCEDURE — 84153 ASSAY OF PSA TOTAL: CPT

## 2020-02-18 NOTE — TELEPHONE ENCOUNTER
Left message with patient that appointment on 2/28/2020 has been changed to 3/17/2020 at 11:10am due to the provider not being in.  Letter mailed

## 2020-03-03 ENCOUNTER — HOSPITAL ENCOUNTER (OUTPATIENT)
Age: 75
Discharge: HOME | End: 2020-03-03
Payer: MEDICARE

## 2020-03-03 LAB
ALBUMIN SERPL-MCNC: 3.6 G/DL (ref 3.5–5.2)
ANION GAP SERPL CALCULATED.3IONS-SCNC: 11 MMOL/L (ref 9–17)
BUN BLDV-MCNC: 19 MG/DL (ref 8–23)
BUN/CREAT BLD: ABNORMAL (ref 9–20)
CALCIUM SERPL-MCNC: 9 MG/DL (ref 8.6–10.4)
CHLORIDE BLD-SCNC: 105 MMOL/L (ref 98–107)
CO2: 26 MMOL/L (ref 20–31)
CREAT SERPL-MCNC: 1.58 MG/DL (ref 0.7–1.2)
CREATININE URINE: 169.6 MG/DL (ref 39–259)
GFR AFRICAN AMERICAN: 52 ML/MIN
GFR NON-AFRICAN AMERICAN: 43 ML/MIN
GFR SERPL CREATININE-BSD FRML MDRD: ABNORMAL ML/MIN/{1.73_M2}
GFR SERPL CREATININE-BSD FRML MDRD: ABNORMAL ML/MIN/{1.73_M2}
GLUCOSE BLD-MCNC: 93 MG/DL (ref 70–99)
HCT VFR BLD CALC: 40.9 % (ref 41–53)
HEMOGLOBIN: 13.8 G/DL (ref 13.5–17.5)
MCH RBC QN AUTO: 31 PG (ref 26–34)
MCHC RBC AUTO-ENTMCNC: 33.8 G/DL (ref 31–37)
MCV RBC AUTO: 91.7 FL (ref 80–100)
NRBC AUTOMATED: ABNORMAL PER 100 WBC
PDW BLD-RTO: 14.3 % (ref 11.5–14.9)
PHOSPHORUS: 3.7 MG/DL (ref 2.5–4.5)
PLATELET # BLD: 211 K/UL (ref 150–450)
PMV BLD AUTO: 9.1 FL (ref 6–12)
POTASSIUM SERPL-SCNC: 4.2 MMOL/L (ref 3.7–5.3)
RBC # BLD: 4.46 M/UL (ref 4.5–5.9)
SODIUM BLD-SCNC: 142 MMOL/L (ref 135–144)
TOTAL PROTEIN, URINE: 566 MG/DL
URINE TOTAL PROTEIN CREATININE RATIO: 3.34 (ref 0–0.2)
WBC # BLD: 9.1 K/UL (ref 3.5–11)

## 2020-03-03 PROCEDURE — 82040 ASSAY OF SERUM ALBUMIN: CPT

## 2020-03-03 PROCEDURE — 80048 BASIC METABOLIC PNL TOTAL CA: CPT

## 2020-03-03 PROCEDURE — 85027 COMPLETE CBC AUTOMATED: CPT

## 2020-03-03 PROCEDURE — 36415 COLL VENOUS BLD VENIPUNCTURE: CPT

## 2020-03-03 PROCEDURE — 84100 ASSAY OF PHOSPHORUS: CPT

## 2020-03-03 PROCEDURE — 82570 ASSAY OF URINE CREATININE: CPT

## 2020-03-03 PROCEDURE — 84156 ASSAY OF PROTEIN URINE: CPT

## 2020-05-29 ENCOUNTER — HOSPITAL ENCOUNTER (OUTPATIENT)
Age: 75
Discharge: HOME | End: 2020-05-29
Payer: MEDICARE

## 2020-05-29 LAB
ALBUMIN SERPL-MCNC: 3.6 G/DL (ref 3.5–5.2)
ANION GAP SERPL CALCULATED.3IONS-SCNC: 12 MMOL/L (ref 9–17)
BUN BLDV-MCNC: 30 MG/DL (ref 8–23)
BUN/CREAT BLD: ABNORMAL (ref 9–20)
CALCIUM SERPL-MCNC: 9.2 MG/DL (ref 8.6–10.4)
CHLORIDE BLD-SCNC: 105 MMOL/L (ref 98–107)
CO2: 24 MMOL/L (ref 20–31)
CREAT SERPL-MCNC: 1.58 MG/DL (ref 0.7–1.2)
CREATININE URINE: 104.2 MG/DL (ref 39–259)
GFR AFRICAN AMERICAN: 52 ML/MIN
GFR NON-AFRICAN AMERICAN: 43 ML/MIN
GFR SERPL CREATININE-BSD FRML MDRD: ABNORMAL ML/MIN/{1.73_M2}
GFR SERPL CREATININE-BSD FRML MDRD: ABNORMAL ML/MIN/{1.73_M2}
GLUCOSE BLD-MCNC: 86 MG/DL (ref 70–99)
HCT VFR BLD CALC: 39.7 % (ref 41–53)
HEMOGLOBIN: 13.1 G/DL (ref 13.5–17.5)
MCH RBC QN AUTO: 30.5 PG (ref 26–34)
MCHC RBC AUTO-ENTMCNC: 32.9 G/DL (ref 31–37)
MCV RBC AUTO: 92.6 FL (ref 80–100)
NRBC AUTOMATED: ABNORMAL PER 100 WBC
PDW BLD-RTO: 14.4 % (ref 11.5–14.9)
PHOSPHORUS: 4.1 MG/DL (ref 2.5–4.5)
PLATELET # BLD: 196 K/UL (ref 150–450)
PMV BLD AUTO: 8.9 FL (ref 6–12)
POTASSIUM SERPL-SCNC: 4.9 MMOL/L (ref 3.7–5.3)
RBC # BLD: 4.28 M/UL (ref 4.5–5.9)
SODIUM BLD-SCNC: 141 MMOL/L (ref 135–144)
TOTAL PROTEIN, URINE: 188 MG/DL
WBC # BLD: 12.3 K/UL (ref 3.5–11)

## 2020-05-29 PROCEDURE — 84156 ASSAY OF PROTEIN URINE: CPT

## 2020-05-29 PROCEDURE — 82570 ASSAY OF URINE CREATININE: CPT

## 2020-05-29 PROCEDURE — 85027 COMPLETE CBC AUTOMATED: CPT

## 2020-05-29 PROCEDURE — 82040 ASSAY OF SERUM ALBUMIN: CPT

## 2020-05-29 PROCEDURE — 36415 COLL VENOUS BLD VENIPUNCTURE: CPT

## 2020-05-29 PROCEDURE — 84100 ASSAY OF PHOSPHORUS: CPT

## 2020-05-29 PROCEDURE — 80048 BASIC METABOLIC PNL TOTAL CA: CPT

## 2020-09-03 ENCOUNTER — HOSPITAL ENCOUNTER (OUTPATIENT)
Age: 75
Discharge: HOME | End: 2020-09-03
Payer: MEDICARE

## 2020-09-03 LAB
ALBUMIN SERPL-MCNC: 3.8 G/DL (ref 3.5–5.2)
ANION GAP SERPL CALCULATED.3IONS-SCNC: 11 MMOL/L (ref 9–17)
BUN BLDV-MCNC: 30 MG/DL (ref 8–23)
BUN/CREAT BLD: ABNORMAL (ref 9–20)
CALCIUM SERPL-MCNC: 9.5 MG/DL (ref 8.6–10.4)
CHLORIDE BLD-SCNC: 107 MMOL/L (ref 98–107)
CO2: 22 MMOL/L (ref 20–31)
CREAT SERPL-MCNC: 1.7 MG/DL (ref 0.7–1.2)
CREATININE URINE: 131.1 MG/DL (ref 39–259)
GFR AFRICAN AMERICAN: 48 ML/MIN
GFR NON-AFRICAN AMERICAN: 39 ML/MIN
GFR SERPL CREATININE-BSD FRML MDRD: ABNORMAL ML/MIN/{1.73_M2}
GFR SERPL CREATININE-BSD FRML MDRD: ABNORMAL ML/MIN/{1.73_M2}
GLUCOSE BLD-MCNC: 143 MG/DL (ref 70–99)
HCT VFR BLD CALC: 41 % (ref 41–53)
HEMOGLOBIN: 13.8 G/DL (ref 13.5–17.5)
MCH RBC QN AUTO: 31.6 PG (ref 26–34)
MCHC RBC AUTO-ENTMCNC: 33.7 G/DL (ref 31–37)
MCV RBC AUTO: 93.6 FL (ref 80–100)
NRBC AUTOMATED: ABNORMAL PER 100 WBC
PDW BLD-RTO: 13.7 % (ref 11.5–14.9)
PHOSPHORUS: 4 MG/DL (ref 2.5–4.5)
PLATELET # BLD: 212 K/UL (ref 150–450)
PMV BLD AUTO: 8.8 FL (ref 6–12)
POTASSIUM SERPL-SCNC: 4.3 MMOL/L (ref 3.7–5.3)
PTH INTACT: 43.23 PG/ML (ref 15–65)
RBC # BLD: 4.38 M/UL (ref 4.5–5.9)
SODIUM BLD-SCNC: 140 MMOL/L (ref 135–144)
TOTAL PROTEIN, URINE: 483 MG/DL
URINE TOTAL PROTEIN CREATININE RATIO: 3.68 (ref 0–0.2)
WBC # BLD: 8.1 K/UL (ref 3.5–11)

## 2020-09-03 PROCEDURE — 82040 ASSAY OF SERUM ALBUMIN: CPT

## 2020-09-03 PROCEDURE — 84156 ASSAY OF PROTEIN URINE: CPT

## 2020-09-03 PROCEDURE — 80048 BASIC METABOLIC PNL TOTAL CA: CPT

## 2020-09-03 PROCEDURE — 82570 ASSAY OF URINE CREATININE: CPT

## 2020-09-03 PROCEDURE — 85027 COMPLETE CBC AUTOMATED: CPT

## 2020-09-03 PROCEDURE — 84100 ASSAY OF PHOSPHORUS: CPT

## 2020-09-03 PROCEDURE — 83970 ASSAY OF PARATHORMONE: CPT

## 2020-09-03 PROCEDURE — 36415 COLL VENOUS BLD VENIPUNCTURE: CPT

## 2020-09-21 RX ORDER — SODIUM CHLORIDE 9 MG/ML
INJECTION, SOLUTION INTRAVENOUS CONTINUOUS
Status: CANCELLED | OUTPATIENT
Start: 2020-09-21

## 2020-09-22 ENCOUNTER — HOSPITAL ENCOUNTER (OUTPATIENT)
Dept: INTERVENTIONAL RADIOLOGY/VASCULAR | Age: 75
Discharge: HOME | End: 2020-09-24
Payer: MEDICARE

## 2020-09-22 VITALS
RESPIRATION RATE: 14 BRPM | WEIGHT: 210 LBS | HEIGHT: 71 IN | TEMPERATURE: 97.1 F | DIASTOLIC BLOOD PRESSURE: 61 MMHG | HEART RATE: 53 BPM | BODY MASS INDEX: 29.4 KG/M2 | SYSTOLIC BLOOD PRESSURE: 139 MMHG | OXYGEN SATURATION: 96 %

## 2020-09-22 LAB
INR BLD: 1
PARTIAL THROMBOPLASTIN TIME: 28.7 SEC (ref 24–36)
PLATELET # BLD: 210 K/UL (ref 150–450)
PROTHROMBIN TIME: 12.6 SEC (ref 11.8–14.6)
SURGICAL PATHOLOGY REPORT: NORMAL

## 2020-09-22 PROCEDURE — 85730 THROMBOPLASTIN TIME PARTIAL: CPT

## 2020-09-22 PROCEDURE — 36415 COLL VENOUS BLD VENIPUNCTURE: CPT

## 2020-09-22 PROCEDURE — 6360000002 HC RX W HCPCS: Performed by: RADIOLOGY

## 2020-09-22 PROCEDURE — 2709999900 CT BIOPSY RENAL

## 2020-09-22 PROCEDURE — 7100000031 HC ASPR PHASE II RECOVERY - ADDTL 15 MIN

## 2020-09-22 PROCEDURE — 88307 TISSUE EXAM BY PATHOLOGIST: CPT

## 2020-09-22 PROCEDURE — 88172 CYTP DX EVAL FNA 1ST EA SITE: CPT

## 2020-09-22 PROCEDURE — 2500000003 HC RX 250 WO HCPCS: Performed by: RADIOLOGY

## 2020-09-22 PROCEDURE — 85049 AUTOMATED PLATELET COUNT: CPT

## 2020-09-22 PROCEDURE — 85610 PROTHROMBIN TIME: CPT

## 2020-09-22 PROCEDURE — 7100000030 HC ASPR PHASE II RECOVERY - FIRST 15 MIN

## 2020-09-22 RX ORDER — SODIUM CHLORIDE 9 MG/ML
INJECTION, SOLUTION INTRAVENOUS CONTINUOUS
Status: DISCONTINUED | OUTPATIENT
Start: 2020-09-22 | End: 2020-09-25 | Stop reason: HOSPADM

## 2020-09-22 RX ORDER — ACETAMINOPHEN 325 MG/1
650 TABLET ORAL EVERY 4 HOURS PRN
Status: DISCONTINUED | OUTPATIENT
Start: 2020-09-22 | End: 2020-09-25 | Stop reason: HOSPADM

## 2020-09-22 RX ORDER — LIDOCAINE HYDROCHLORIDE 10 MG/ML
INJECTION, SOLUTION INFILTRATION; PERINEURAL
Status: COMPLETED | OUTPATIENT
Start: 2020-09-22 | End: 2020-09-22

## 2020-09-22 RX ORDER — ONDANSETRON 2 MG/ML
4 INJECTION INTRAMUSCULAR; INTRAVENOUS EVERY 8 HOURS PRN
Status: DISCONTINUED | OUTPATIENT
Start: 2020-09-22 | End: 2020-09-25 | Stop reason: HOSPADM

## 2020-09-22 RX ORDER — FENTANYL CITRATE 50 UG/ML
INJECTION, SOLUTION INTRAMUSCULAR; INTRAVENOUS
Status: COMPLETED | OUTPATIENT
Start: 2020-09-22 | End: 2020-09-22

## 2020-09-22 RX ADMIN — FENTANYL CITRATE 50 MCG: 50 INJECTION, SOLUTION INTRAMUSCULAR; INTRAVENOUS at 12:08

## 2020-09-22 RX ADMIN — ONDANSETRON 4 MG: 2 INJECTION INTRAMUSCULAR; INTRAVENOUS at 12:53

## 2020-09-22 RX ADMIN — LIDOCAINE HYDROCHLORIDE 5 ML: 10 INJECTION, SOLUTION INFILTRATION; PERINEURAL at 11:57

## 2020-09-22 RX ADMIN — FENTANYL CITRATE 50 MCG: 50 INJECTION, SOLUTION INTRAMUSCULAR; INTRAVENOUS at 11:55

## 2020-09-22 ASSESSMENT — PAIN - FUNCTIONAL ASSESSMENT: PAIN_FUNCTIONAL_ASSESSMENT: 0-10

## 2020-09-22 ASSESSMENT — PAIN DESCRIPTION - LOCATION: LOCATION: ABDOMEN

## 2020-09-22 ASSESSMENT — PAIN DESCRIPTION - DESCRIPTORS: DESCRIPTORS: ACHING

## 2020-09-22 ASSESSMENT — PAIN SCALES - GENERAL: PAINLEVEL_OUTOF10: 4

## 2020-09-22 NOTE — BRIEF OP NOTE
Brief Postoperative Note    Chester Burkitt  YOB: 1945  [id number]    Pre-operative Diagnosis: Chronic kidney disease    Post-operative Diagnosis: Same    Procedure: Rt renal biopsy for function    Medications Given: fentanyl    Anesthesia: Local    Surgeons/Assistants: Sarbjit King MD    Estimated Blood Loss: minimal    Complications: none    Specimens: were obtained    Findings: 6 20 g core biopsies lower pole right kidney obtained under CT fluoroscopy. Specimen adequacy confirmed by pathology on site. Small perirenal hematoma noted. Will follow closely. Pt had some nausea associated with the fentanyl. Zofran ordered PRN.       Electronically signed by Sarbjit King on 9/22/2020 at 12:35 PM

## 2020-09-22 NOTE — PROGRESS NOTES
Patient arrives from IR at [phone], very anxious/diaphoretic attempting to get off the bed,he is ready to go home, he wants to go to the bathroom states he pooped his pants. Vitals obtained, pt states he feels sick/nauseous, put pt on bedpan, pt had large BM, pt cleaned up. Explained to patient that he needs to relax and lay on his right side. Zofran given via IV. Patient calms down and is resting. Vitals obtained again and pulse ox dropped to 75%,spoke with Dr Denny Ray and order received to put pt on 2 liters of O2. Continue monitoring vitals.

## 2020-09-22 NOTE — H&P
HISTORY and [address] [address]       NAME:  Kian Combs  MRN: [id number]   YOB: 1945   Date: 9/22/2020   Age: 67 y.o. Gender: male       COMPLAINT AND PRESENT HISTORY:     Kian Combs is 67 y.o.,  male, [facility]. Pt has history of   Kidney disease stage III, proteinuria., DJD, hypertension and hyperlipidemia,    Patient has been on long term NSAID for 20 years for treatment of arthritis. Pt states, he see bubbles in his urine since the last three months, at morning the urine is clear but through out the day the urine has a lots of bubbles. The pt's wife said he had urine studies and he was diagnosed to have proteinuria, and he is taking spironolactone 25 mg every other day. His creatinine 1.4-1.5 since 2015  Proteinuria fluctuates between 1-2 g. Pt denies pain , no urinary complaints,  No SOB, no chest pain, no fever/chills. .  Pt denies Hx of infection , no problem with anesthesia. Pt NPO since the past midnight, he took galantamine and spirolactone this santana ng with sip of water. PAST MEDICAL HISTORY     Past Medical History:   Diagnosis Date    Arthritis     Aspirin long-term use     Back pain     Chronic kidney disease     CKD (chronic kidney disease), stage III ([address] 8/9/2017    Baseline creatinine 1.4-1.5. Proteinuria reported by primary physician. Workup in progress.  Essential hypertension 8/9/2017    Fall at home 12/09/2019    Family history of prostate problems     Heart disease     Hiatal hernia     HTN (hypertension)     Hyperlipemia     Isolated proteinuria 8/9/2017    Nonnephrotic, being quantified.     Mitral valve prolapse 8/9/2017    MVP (mitral valve prolapse)     Neck pain     NSAID long-term use 8/9/2017    Primary osteoarthritis of hand 8/9/2017    Urination, excessive at night        SURGICAL HISTORY       Past Surgical History:   Procedure Laterality Date    ANKLE SURGERY      COLONOSCOPY      [facility]    LUMBAR DISC SURGERY      NECK SURGERY      SPINE SURGERY      TONSILLECTOMY AND ADENOIDECTOMY         FAMILY HISTORY       Family History   Family history unknown: Yes       SOCIAL HISTORY       Social History     Socioeconomic History    Marital status:      Spouse name: None    Number of children: None    Years of education: None    Highest education level: None   Occupational History    None   Social Needs    Financial resource strain: None    Food insecurity     Worry: None     Inability: None    Transportation needs     Medical: None     Non-medical: None   Tobacco Use    Smoking status: Former Smoker     Last attempt to quit: 3/17/1975     Years since quittin.5    Smokeless tobacco: Never Used   Substance and Sexual Activity    Alcohol use: No    Drug use: No    Sexual activity: None   Lifestyle    Physical activity     Days per week: None     Minutes per session: None    Stress: None   Relationships    Social connections     Talks on phone: None     Gets together: None     Attends [employer] service: None     Active member of club or organization: None     Attends meetings of clubs or organizations: None     Relationship status: None    Intimate partner violence     Fear of current or ex partner: None     Emotionally abused: None     Physically abused: None     Forced sexual activity: None   Other Topics Concern    None   Social History Narrative    None           REVIEW OF SYSTEMS      Allergies   Allergen Reactions    Other      \"HAY FEVER\"       Current Outpatient Medications on File Prior to Encounter   Medication Sig Dispense Refill    galantamine (RAZADYNE ER) 8 MG extended release capsule 8 mg daily      spironolactone (ALDACTONE) 25 MG tablet Take 1 tablet by mouth every other day 45 tablet 3    tamsulosin (FLOMAX) 0.4 MG capsule TAKE 1 CAPSULE DAILY 90 capsule 3    Polyethyl Glycol-Propyl Glycol (SYSTANE OP) Apply to eye 3 times daily as needed Both eyes TID      lisinopril (PRINIVIL;ZESTRIL) 20 MG tablet Take 20 mg by mouth 2 times daily      azelastine (OPTIVAR) 0.05 % ophthalmic solution Place 1 drop into both eyes daily      Turmeric 500 MG CAPS Take by mouth daily      atorvastatin (LIPITOR) 20 MG tablet Take 20 mg by mouth daily      Multiple Vitamins-Minerals (OCUVITE PO) Take by mouth      allopurinol (ZYLOPRIM) 300 MG tablet Take 300 mg by mouth daily      aspirin 81 MG tablet Take 81 mg by mouth daily       No current facility-administered medications on file prior to encounter. Negative except for what is mentioned in the HPI. GENERAL PHYSICAL EXAM     Vitals: BP (!) 177/89   Pulse 60   Temp 97.2 °F (36.2 °C) (Temporal)   Resp 16   Ht 5' 11\" (1.803 m)   Wt 210 lb (95.3 kg)   SpO2 97%   BMI 29.29 kg/m²  Body mass index is 29.29 kg/m². GENERAL APPEARANCE:   Louie Rodriguez is 67 y.o.,  male, not obese, nourished, conscious, alert. Does not appear to be distress or pain at this time. SKIN:  Warm, dry, no cyanosis or jaundice. HEAD:  Normocephalic, atraumatic, no swelling or tenderness. EYES:  Pupils equal, reactive to light. EARS:  No discharge, no marked hearing loss. NOSE:  No rhinorrhea, epistaxis or septal deformity. THROAT:  Not congested. No ulceration bleeding or discharge. NECK:  No stiffness, trachea central.  No palpable masses or L.N.                 CHEST:  Symmetrical and equal on expansion. HEART:  RRR S1 > S2. No audible murmurs or gallops. BP is ant , hypertensive ( pt did not take his BP medication today)                 LUNGS:  Equal on expansion, normal breath sounds. No adventitious sounds. ABDOMEN:  Obese. Soft on palpation. No dysphagia, No localized tenderness. No guarding or rigidity. No palpable hepatosplenomegaly. LYMPHATICS:  No palpable cervical lymphadenopathy. LOCOMOTOR, BACK AND SPINE:  No tenderness or deformities. EXTREMITIES:  Symmetrical, no pretibial edema. Bretts sign negative. No discoloration or ulcerations. NEUROLOGIC:  The patient is conscious, alert, oriented,Cranial nerve II-XII intact, taste and smell were not examined. No apparent focal sensory or motor deficits.              PROVISIONAL DIAGNOSES / SURGERY:      Chronic Kidney disease stage III  proteinuria   STC IR BIOPSY W US      Patient Active Problem List    Diagnosis Date Noted    CKD (chronic kidney disease), stage III ([facility] [address] 08/09/2017    Isolated proteinuria 08/09/2017    NSAID long-term use 08/09/2017    Essential hypertension 08/09/2017    Primary osteoarthritis of hand 08/09/2017    Mitral valve prolapse 08/09/2017    History of left inguinal hernia 03/17/2015           MORA Jaeger CNP on 9/22/2020 at 10:03 AM

## 2020-09-22 NOTE — PROGRESS NOTES
Patient awakens easily, assists pt with getting dressed. Rechecked patient's O2 sat after being off O2 for 15 minutes. Explained discharge instructions in length with patient's wife, she states understanding. Dr Barbara Solis called and asked that RN calls patient at home after they leave to make sure they get home ok. Confirmed home phone number with wife and told her I would be calling.

## 2020-09-22 NOTE — PROGRESS NOTES
Called patient at home, per wife they made it home and he is doing fine,states he is \"back to normal' instructed her again to call if she has any questions.

## 2020-09-28 ENCOUNTER — CLINICAL DOCUMENTATION (OUTPATIENT)
Dept: NEPHROLOGY | Age: 75
End: 2020-09-28

## 2020-09-28 ENCOUNTER — HOSPITAL ENCOUNTER (OUTPATIENT)
Age: 75
Discharge: HOME | End: 2020-09-28
Payer: MEDICARE

## 2020-09-28 PROBLEM — N11.9 INTERSTITIAL NEPHRITIS CHRONIC: Status: ACTIVE | Noted: 2020-09-28

## 2020-09-28 LAB
ALBUMIN SERPL-MCNC: 3.5 G/DL (ref 3.5–5.2)
ALBUMIN/GLOBULIN RATIO: ABNORMAL (ref 1–2.5)
ALP BLD-CCNC: 28 U/L (ref 40–129)
ALT SERPL-CCNC: 10 U/L (ref 5–41)
ANION GAP SERPL CALCULATED.3IONS-SCNC: 11 MMOL/L (ref 9–17)
AST SERPL-CCNC: 16 U/L
BILIRUB SERPL-MCNC: 0.32 MG/DL (ref 0.3–1.2)
BUN BLDV-MCNC: 25 MG/DL (ref 8–23)
BUN/CREAT BLD: ABNORMAL (ref 9–20)
CALCIUM SERPL-MCNC: 9 MG/DL (ref 8.6–10.4)
CHLORIDE BLD-SCNC: 107 MMOL/L (ref 98–107)
CO2: 24 MMOL/L (ref 20–31)
CREAT SERPL-MCNC: 1.64 MG/DL (ref 0.7–1.2)
FOLATE: 10.1 NG/ML
GFR AFRICAN AMERICAN: 50 ML/MIN
GFR NON-AFRICAN AMERICAN: 41 ML/MIN
GFR SERPL CREATININE-BSD FRML MDRD: ABNORMAL ML/MIN/{1.73_M2}
GFR SERPL CREATININE-BSD FRML MDRD: ABNORMAL ML/MIN/{1.73_M2}
GLUCOSE BLD-MCNC: 83 MG/DL (ref 70–99)
POTASSIUM SERPL-SCNC: 4.3 MMOL/L (ref 3.7–5.3)
SODIUM BLD-SCNC: 142 MMOL/L (ref 135–144)
TOTAL PROTEIN: 6.3 G/DL (ref 6.4–8.3)
TSH SERPL DL<=0.05 MIU/L-ACNC: 0.97 MIU/L (ref 0.3–5)
VITAMIN B-12: 295 PG/ML (ref 232–1245)

## 2020-09-28 PROCEDURE — 80053 COMPREHEN METABOLIC PANEL: CPT

## 2020-09-28 PROCEDURE — 82746 ASSAY OF FOLIC ACID SERUM: CPT

## 2020-09-28 PROCEDURE — 84443 ASSAY THYROID STIM HORMONE: CPT

## 2020-09-28 PROCEDURE — 36415 COLL VENOUS BLD VENIPUNCTURE: CPT

## 2020-09-28 PROCEDURE — 82607 VITAMIN B-12: CPT

## 2020-10-07 PROBLEM — N05.5: Status: ACTIVE | Noted: 2020-10-07

## 2020-10-07 PROBLEM — R53.82 CHRONIC FATIGUE: Status: ACTIVE | Noted: 2020-10-07

## 2020-10-07 PROBLEM — N04.9 NEPHROTIC SYNDROME: Status: ACTIVE | Noted: 2020-10-07

## 2020-10-11 ENCOUNTER — CLINICAL DOCUMENTATION (OUTPATIENT)
Dept: NEPHROLOGY | Age: 75
End: 2020-10-11

## 2020-10-13 ENCOUNTER — HOSPITAL ENCOUNTER (OUTPATIENT)
Age: 75
Discharge: HOME | End: 2020-10-13
Payer: MEDICARE

## 2020-10-13 LAB
COMPLEMENT C3: 125 MG/DL (ref 90–180)
COMPLEMENT C4: 28 MG/DL (ref 10–40)
HAV IGM SER IA-ACNC: NONREACTIVE
HEPATITIS B CORE IGM ANTIBODY: NONREACTIVE
HEPATITIS B SURFACE ANTIGEN: NONREACTIVE
HEPATITIS C ANTIBODY: NONREACTIVE
RHEUMATOID FACTOR: <10 IU/ML

## 2020-10-13 PROCEDURE — 83516 IMMUNOASSAY NONANTIBODY: CPT

## 2020-10-13 PROCEDURE — 86160 COMPLEMENT ANTIGEN: CPT

## 2020-10-13 PROCEDURE — 86235 NUCLEAR ANTIGEN ANTIBODY: CPT

## 2020-10-13 PROCEDURE — 86225 DNA ANTIBODY NATIVE: CPT

## 2020-10-13 PROCEDURE — 86431 RHEUMATOID FACTOR QUANT: CPT

## 2020-10-13 PROCEDURE — 80074 ACUTE HEPATITIS PANEL: CPT

## 2020-10-13 PROCEDURE — 36415 COLL VENOUS BLD VENIPUNCTURE: CPT

## 2020-10-13 PROCEDURE — 86038 ANTINUCLEAR ANTIBODIES: CPT

## 2020-10-14 LAB
ANTI DNA DOUBLE STRANDED: 17 IU/ML
ANTI-NUCLEAR ANTIBODY (ANA): NEGATIVE
ANTI-SMITH: 12 U/ML

## 2020-10-15 LAB
ANCA MYELOPEROXIDASE: 19 AU/ML
ANCA PROTEINASE 3: 10 AU/ML
GBM ANTIBODY IGG: 7 AU/ML

## 2021-01-02 ENCOUNTER — HOSPITAL ENCOUNTER (OUTPATIENT)
Age: 76
Discharge: HOME | End: 2021-01-02
Payer: MEDICARE

## 2021-01-02 LAB
COMPLEMENT C3: 113 MG/DL (ref 90–180)
COMPLEMENT C4: 29 MG/DL (ref 10–40)
HAV IGM SER IA-ACNC: NONREACTIVE
HEPATITIS B CORE IGM ANTIBODY: NONREACTIVE
HEPATITIS B SURFACE ANTIGEN: NONREACTIVE
HEPATITIS C ANTIBODY: NONREACTIVE
RHEUMATOID FACTOR: <10 IU/ML

## 2021-01-02 PROCEDURE — 86038 ANTINUCLEAR ANTIBODIES: CPT

## 2021-01-02 PROCEDURE — 36415 COLL VENOUS BLD VENIPUNCTURE: CPT

## 2021-01-02 PROCEDURE — 86160 COMPLEMENT ANTIGEN: CPT

## 2021-01-02 PROCEDURE — 83516 IMMUNOASSAY NONANTIBODY: CPT

## 2021-01-02 PROCEDURE — 86225 DNA ANTIBODY NATIVE: CPT

## 2021-01-02 PROCEDURE — 86235 NUCLEAR ANTIGEN ANTIBODY: CPT

## 2021-01-02 PROCEDURE — 80074 ACUTE HEPATITIS PANEL: CPT

## 2021-01-02 PROCEDURE — 86431 RHEUMATOID FACTOR QUANT: CPT

## 2021-01-04 LAB
ANTI DNA DOUBLE STRANDED: 21 IU/ML
ANTI-NUCLEAR ANTIBODY (ANA): NEGATIVE
ANTI-SMITH: 12 U/ML

## 2021-01-05 LAB
ANCA MYELOPEROXIDASE: 44 AU/ML
ANCA PROTEINASE 3: 6 AU/ML
GBM ANTIBODY IGG: 4 AU/ML

## 2021-01-19 ENCOUNTER — HOSPITAL ENCOUNTER (OUTPATIENT)
Age: 76
Discharge: HOME | DRG: 641 | End: 2021-01-19
Payer: MEDICARE

## 2021-01-19 DIAGNOSIS — N18.30 CHRONIC KIDNEY DISEASE, STAGE III (MODERATE) (HCC): ICD-10-CM

## 2021-01-19 DIAGNOSIS — R80.1 PERSISTENT PROTEINURIA: ICD-10-CM

## 2021-01-19 LAB
CREATININE URINE: 174 MG/DL (ref 39–259)
TOTAL PROTEIN, URINE: 360 MG/DL
URINE TOTAL PROTEIN CREATININE RATIO: 2.07 (ref 0–0.2)

## 2021-01-19 PROCEDURE — 82570 ASSAY OF URINE CREATININE: CPT

## 2021-01-19 PROCEDURE — 84156 ASSAY OF PROTEIN URINE: CPT

## 2021-01-21 ENCOUNTER — HOSPITAL ENCOUNTER (INPATIENT)
Age: 76
LOS: 1 days | Discharge: HOME | DRG: 641 | End: 2021-01-22
Attending: EMERGENCY MEDICINE | Admitting: INTERNAL MEDICINE
Payer: MEDICARE

## 2021-01-21 ENCOUNTER — APPOINTMENT (OUTPATIENT)
Dept: CT IMAGING | Age: 76
DRG: 641 | End: 2021-01-21
Payer: MEDICARE

## 2021-01-21 DIAGNOSIS — N17.9 AKI (ACUTE KIDNEY INJURY) (HCC): Primary | ICD-10-CM

## 2021-01-21 PROBLEM — R32 URINARY INCONTINENCE: Status: ACTIVE | Noted: 2021-01-21

## 2021-01-21 PROBLEM — R53.1 WEAKNESS: Status: ACTIVE | Noted: 2020-10-07

## 2021-01-21 PROBLEM — R26.9 GAIT DISTURBANCE: Status: ACTIVE | Noted: 2021-01-21

## 2021-01-21 PROBLEM — N40.0 BPH (BENIGN PROSTATIC HYPERPLASIA): Status: ACTIVE | Noted: 2021-01-21

## 2021-01-21 PROBLEM — N18.9 ACUTE KIDNEY INJURY SUPERIMPOSED ON CHRONIC KIDNEY DISEASE (HCC): Status: ACTIVE | Noted: 2021-01-21

## 2021-01-21 LAB
-: ABNORMAL
ABSOLUTE EOS #: 0.3 K/UL (ref 0–0.4)
ABSOLUTE IMMATURE GRANULOCYTE: ABNORMAL K/UL (ref 0–0.3)
ABSOLUTE LYMPH #: 0.9 K/UL (ref 1–4.8)
ABSOLUTE MONO #: 0.9 K/UL (ref 0.1–1.3)
ALBUMIN SERPL-MCNC: 3.2 G/DL (ref 3.5–5.2)
ALBUMIN/GLOBULIN RATIO: ABNORMAL (ref 1–2.5)
ALP BLD-CCNC: 32 U/L (ref 40–129)
ALT SERPL-CCNC: 13 U/L (ref 5–41)
AMORPHOUS: ABNORMAL
ANION GAP SERPL CALCULATED.3IONS-SCNC: 12 MMOL/L (ref 9–17)
AST SERPL-CCNC: 26 U/L
BACTERIA: ABNORMAL
BASOPHILS # BLD: 1 % (ref 0–2)
BASOPHILS ABSOLUTE: 0.1 K/UL (ref 0–0.2)
BILIRUB SERPL-MCNC: 0.37 MG/DL (ref 0.3–1.2)
BILIRUBIN URINE: NEGATIVE
BNP INTERPRETATION: NORMAL
BUN BLDV-MCNC: 46 MG/DL (ref 8–23)
BUN/CREAT BLD: ABNORMAL (ref 9–20)
CALCIUM SERPL-MCNC: 9.1 MG/DL (ref 8.6–10.4)
CASTS UA: ABNORMAL /LPF
CHLORIDE BLD-SCNC: 104 MMOL/L (ref 98–107)
CO2: 22 MMOL/L (ref 20–31)
COLOR: YELLOW
COMMENT UA: ABNORMAL
CREAT SERPL-MCNC: 2.25 MG/DL (ref 0.7–1.2)
CREATININE URINE: 92 MG/DL (ref 39–259)
CRYSTALS, UA: ABNORMAL /HPF
DIFFERENTIAL TYPE: ABNORMAL
EOSINOPHILS RELATIVE PERCENT: 3 % (ref 0–4)
EPITHELIAL CELLS UA: ABNORMAL /HPF
GFR AFRICAN AMERICAN: 35 ML/MIN
GFR NON-AFRICAN AMERICAN: 29 ML/MIN
GFR SERPL CREATININE-BSD FRML MDRD: ABNORMAL ML/MIN/{1.73_M2}
GFR SERPL CREATININE-BSD FRML MDRD: ABNORMAL ML/MIN/{1.73_M2}
GLUCOSE BLD-MCNC: 99 MG/DL (ref 70–99)
GLUCOSE URINE: NEGATIVE
HCT VFR BLD CALC: 36.9 % (ref 41–53)
HEMOGLOBIN: 12.3 G/DL (ref 13.5–17.5)
IMMATURE GRANULOCYTES: ABNORMAL %
KETONES, URINE: NEGATIVE
LEUKOCYTE ESTERASE, URINE: NEGATIVE
LYMPHOCYTES # BLD: 10 % (ref 24–44)
MCH RBC QN AUTO: 30.8 PG (ref 26–34)
MCHC RBC AUTO-ENTMCNC: 33.2 G/DL (ref 31–37)
MCV RBC AUTO: 92.5 FL (ref 80–100)
MONOCYTES # BLD: 10 % (ref 1–7)
MUCUS: ABNORMAL
NITRITE, URINE: NEGATIVE
NRBC AUTOMATED: ABNORMAL PER 100 WBC
OTHER OBSERVATIONS UA: ABNORMAL
PDW BLD-RTO: 14.7 % (ref 11.5–14.9)
PH UA: 5 (ref 5–8)
PLATELET # BLD: 168 K/UL (ref 150–450)
PLATELET ESTIMATE: ABNORMAL
PMV BLD AUTO: 9.5 FL (ref 6–12)
POTASSIUM SERPL-SCNC: 4.5 MMOL/L (ref 3.7–5.3)
PRO-BNP: 182 PG/ML
PROTEIN UA: ABNORMAL
RBC # BLD: 3.99 M/UL (ref 4.5–5.9)
RBC # BLD: ABNORMAL 10*6/UL
RBC UA: ABNORMAL /HPF
RENAL EPITHELIAL, UA: ABNORMAL /HPF
SEG NEUTROPHILS: 76 % (ref 36–66)
SEGMENTED NEUTROPHILS ABSOLUTE COUNT: 6.7 K/UL (ref 1.3–9.1)
SODIUM BLD-SCNC: 138 MMOL/L (ref 135–144)
SODIUM,UR: 54 MMOL/L
SPECIFIC GRAVITY UA: 1.02 (ref 1–1.03)
TOTAL PROTEIN: 6.4 G/DL (ref 6.4–8.3)
TRICHOMONAS: ABNORMAL
TROPONIN INTERP: ABNORMAL
TROPONIN INTERP: ABNORMAL
TROPONIN T: ABNORMAL NG/ML
TROPONIN T: ABNORMAL NG/ML
TROPONIN, HIGH SENSITIVITY: 34 NG/L (ref 0–22)
TROPONIN, HIGH SENSITIVITY: 39 NG/L (ref 0–22)
TURBIDITY: CLEAR
URINE HGB: ABNORMAL
UROBILINOGEN, URINE: NORMAL
WBC # BLD: 8.8 K/UL (ref 3.5–11)
WBC # BLD: ABNORMAL 10*3/UL
WBC UA: ABNORMAL /HPF
YEAST: ABNORMAL

## 2021-01-21 PROCEDURE — 84300 ASSAY OF URINE SODIUM: CPT

## 2021-01-21 PROCEDURE — 2580000003 HC RX 258: Performed by: EMERGENCY MEDICINE

## 2021-01-21 PROCEDURE — 93005 ELECTROCARDIOGRAM TRACING: CPT | Performed by: EMERGENCY MEDICINE

## 2021-01-21 PROCEDURE — 2580000003 HC RX 258: Performed by: STUDENT IN AN ORGANIZED HEALTH CARE EDUCATION/TRAINING PROGRAM

## 2021-01-21 PROCEDURE — 81001 URINALYSIS AUTO W/SCOPE: CPT

## 2021-01-21 PROCEDURE — 83880 ASSAY OF NATRIURETIC PEPTIDE: CPT

## 2021-01-21 PROCEDURE — 1200000000 HC SEMI PRIVATE

## 2021-01-21 PROCEDURE — 84484 ASSAY OF TROPONIN QUANT: CPT

## 2021-01-21 PROCEDURE — 99285 EMERGENCY DEPT VISIT HI MDM: CPT

## 2021-01-21 PROCEDURE — 6370000000 HC RX 637 (ALT 250 FOR IP): Performed by: STUDENT IN AN ORGANIZED HEALTH CARE EDUCATION/TRAINING PROGRAM

## 2021-01-21 PROCEDURE — 36415 COLL VENOUS BLD VENIPUNCTURE: CPT

## 2021-01-21 PROCEDURE — 85025 COMPLETE CBC W/AUTO DIFF WBC: CPT

## 2021-01-21 PROCEDURE — 80053 COMPREHEN METABOLIC PANEL: CPT

## 2021-01-21 PROCEDURE — 6370000000 HC RX 637 (ALT 250 FOR IP): Performed by: INTERNAL MEDICINE

## 2021-01-21 PROCEDURE — 70450 CT HEAD/BRAIN W/O DYE: CPT

## 2021-01-21 PROCEDURE — 82570 ASSAY OF URINE CREATININE: CPT

## 2021-01-21 RX ORDER — ACETAMINOPHEN 325 MG/1
650 TABLET ORAL EVERY 6 HOURS PRN
Status: DISCONTINUED | OUTPATIENT
Start: 2021-01-21 | End: 2021-01-22 | Stop reason: HOSPADM

## 2021-01-21 RX ORDER — ONDANSETRON 2 MG/ML
4 INJECTION INTRAMUSCULAR; INTRAVENOUS EVERY 6 HOURS PRN
Status: DISCONTINUED | OUTPATIENT
Start: 2021-01-21 | End: 2021-01-22 | Stop reason: HOSPADM

## 2021-01-21 RX ORDER — SODIUM CHLORIDE 9 MG/ML
INJECTION, SOLUTION INTRAVENOUS CONTINUOUS
Status: DISCONTINUED | OUTPATIENT
Start: 2021-01-21 | End: 2021-01-22 | Stop reason: HOSPADM

## 2021-01-21 RX ORDER — 0.9 % SODIUM CHLORIDE 0.9 %
1000 INTRAVENOUS SOLUTION INTRAVENOUS ONCE
Status: COMPLETED | OUTPATIENT
Start: 2021-01-21 | End: 2021-01-21

## 2021-01-21 RX ORDER — SODIUM CHLORIDE 0.9 % (FLUSH) 0.9 %
10 SYRINGE (ML) INJECTION PRN
Status: DISCONTINUED | OUTPATIENT
Start: 2021-01-21 | End: 2021-01-22 | Stop reason: HOSPADM

## 2021-01-21 RX ORDER — ASPIRIN 81 MG/1
81 TABLET ORAL DAILY
Status: DISCONTINUED | OUTPATIENT
Start: 2021-01-21 | End: 2021-01-22 | Stop reason: HOSPADM

## 2021-01-21 RX ORDER — SODIUM CHLORIDE 0.9 % (FLUSH) 0.9 %
10 SYRINGE (ML) INJECTION EVERY 12 HOURS SCHEDULED
Status: DISCONTINUED | OUTPATIENT
Start: 2021-01-21 | End: 2021-01-22 | Stop reason: HOSPADM

## 2021-01-21 RX ORDER — ACETAMINOPHEN 650 MG/1
650 SUPPOSITORY RECTAL EVERY 6 HOURS PRN
Status: DISCONTINUED | OUTPATIENT
Start: 2021-01-21 | End: 2021-01-22 | Stop reason: HOSPADM

## 2021-01-21 RX ORDER — ATORVASTATIN CALCIUM 20 MG/1
20 TABLET, FILM COATED ORAL DAILY
Status: DISCONTINUED | OUTPATIENT
Start: 2021-01-21 | End: 2021-01-22 | Stop reason: HOSPADM

## 2021-01-21 RX ORDER — POTASSIUM CHLORIDE 20 MEQ/1
40 TABLET, EXTENDED RELEASE ORAL PRN
Status: DISCONTINUED | OUTPATIENT
Start: 2021-01-21 | End: 2021-01-22 | Stop reason: HOSPADM

## 2021-01-21 RX ORDER — QUETIAPINE FUMARATE 25 MG/1
25 TABLET, FILM COATED ORAL NIGHTLY
Status: DISCONTINUED | OUTPATIENT
Start: 2021-01-21 | End: 2021-01-22 | Stop reason: HOSPADM

## 2021-01-21 RX ORDER — GALANTAMINE HYDROBROMIDE 8 MG/1
8 CAPSULE, EXTENDED RELEASE ORAL
COMMUNITY

## 2021-01-21 RX ORDER — SPIRONOLACTONE 25 MG/1
25 TABLET ORAL DAILY
Status: DISCONTINUED | OUTPATIENT
Start: 2021-01-21 | End: 2021-01-22 | Stop reason: HOSPADM

## 2021-01-21 RX ORDER — PROMETHAZINE HYDROCHLORIDE 25 MG/1
12.5 TABLET ORAL EVERY 6 HOURS PRN
Status: DISCONTINUED | OUTPATIENT
Start: 2021-01-21 | End: 2021-01-22 | Stop reason: HOSPADM

## 2021-01-21 RX ORDER — POTASSIUM CHLORIDE 7.45 MG/ML
10 INJECTION INTRAVENOUS PRN
Status: DISCONTINUED | OUTPATIENT
Start: 2021-01-21 | End: 2021-01-22 | Stop reason: HOSPADM

## 2021-01-21 RX ORDER — POLYETHYLENE GLYCOL 3350 17 G/17G
17 POWDER, FOR SOLUTION ORAL DAILY PRN
Status: DISCONTINUED | OUTPATIENT
Start: 2021-01-21 | End: 2021-01-22 | Stop reason: HOSPADM

## 2021-01-21 RX ORDER — FAMOTIDINE 20 MG/1
20 TABLET, FILM COATED ORAL DAILY
Status: DISCONTINUED | OUTPATIENT
Start: 2021-01-21 | End: 2021-01-22 | Stop reason: HOSPADM

## 2021-01-21 RX ORDER — HEPARIN SODIUM 5000 [USP'U]/ML
5000 INJECTION, SOLUTION INTRAVENOUS; SUBCUTANEOUS EVERY 8 HOURS SCHEDULED
Status: DISCONTINUED | OUTPATIENT
Start: 2021-01-21 | End: 2021-01-22 | Stop reason: HOSPADM

## 2021-01-21 RX ORDER — ALLOPURINOL 300 MG/1
300 TABLET ORAL DAILY
Status: CANCELLED | OUTPATIENT
Start: 2021-01-21

## 2021-01-21 RX ADMIN — SPIRONOLACTONE 25 MG: 25 TABLET, FILM COATED ORAL at 19:22

## 2021-01-21 RX ADMIN — SODIUM CHLORIDE: 9 INJECTION, SOLUTION INTRAVENOUS at 19:22

## 2021-01-21 RX ADMIN — QUETIAPINE FUMARATE 25 MG: 25 TABLET ORAL at 23:40

## 2021-01-21 RX ADMIN — ATORVASTATIN CALCIUM 20 MG: 20 TABLET, FILM COATED ORAL at 19:21

## 2021-01-21 RX ADMIN — SODIUM CHLORIDE 1000 ML: 9 INJECTION, SOLUTION INTRAVENOUS at 16:15

## 2021-01-21 ASSESSMENT — ENCOUNTER SYMPTOMS
ALLERGIC/IMMUNOLOGIC NEGATIVE: 1
VOMITING: 0
NAUSEA: 0
EYES NEGATIVE: 1
ABDOMINAL PAIN: 0
SHORTNESS OF BREATH: 0
BACK PAIN: 0
CONSTIPATION: 1
COLOR CHANGE: 0
CHEST TIGHTNESS: 0
EYE PAIN: 0

## 2021-01-21 NOTE — ED NOTES
Pt arrives to ED with wife who states that \"for a while\" patient has been having intermittent \"confusion spells\" that have been worsening over the last month. Wife states that patient has also had trouble walking at home. Wife states that patient was walking down the [address] and could hardly walk and fell into the wall. Patient states that this occurred on Tuesday night. Wife states that since then patient has done okay walking. Wife states that she called PCP yesterday and patient was called in two medications, Cipro and clonzaepam. Patient is alert and oriented x4. Patient denies any pain.         Janis Marquez RN  01/21/21 [id number]

## 2021-01-21 NOTE — H&P
[address]      [address] [facility]     HISTORY AND PHYSICAL EXAMINATION            Date:   1/21/2021  Patient name:  Giulia Atkins  Date of admission:  1/21/2021  1:42 PM  MRN:   [id number]  Account:  [de-identified]  YOB: 1945  PCP:    Feng Dickerson Host  Room:   2073/2073-01  Code Status:    Full Code    Chief Complaint:     Chief Complaint   Patient presents with    Altered Mental Status    Gait Problem       History Obtained From:     Patient and his spouse    History of Present Illness: The patient is a 67 y.o. Non-/non  male who presents withAltered Mental Status and Gait Problem   and he is admitted to the hospital for the management of    Acute on chronic kidney disease. 74-year-old  male with past medical history of chronic kidney disease stage III, dementia and galantamine since past few months, BPH, essential hypertension, nephrotic syndrome, isolated proteinuria and fall presented to the emergency department with complaint of gait disturbance starting on 1/19 Tuesday evening. According to patient's wife, the patient loses his balance while walking down the stairs. Patient denies any feeling of dizziness or lightheadedness. He also had 2 episodes of urinary incontinence during sleep. He states that after urination he has urine drops dribbling. He has history of BPH and is following up with a urologist.  He denies any urine leakage with cough or sneezing. He also denies any burning sensation in the urine or any recent fever or abdominal pain. Patient states that he has chronic constipation and take over-the-counter laxatives. Patient was able to recall all his information and was able to give good amount of history. CT scan was negative for any acute abnormality except cerebral atrophic changes.     Past Medical History: Past Medical History:   Diagnosis Date    Arthritis     Aspirin long-term use     Back pain     Chronic fatigue 10/7/2020    Chronic kidney disease     CKD (chronic kidney disease), stage III 8/9/2017    Baseline creatinine 1.4-1.5. Proteinuria reported by primary physician. Workup in progress.  Essential hypertension 8/9/2017    Fall at home 12/09/2019    Family history of prostate problems     Heart disease     Hiatal hernia     HTN (hypertension)     Hyperlipemia     Interstitial nephritis chronic 9/28/2020    Kidney bx 9/25/20 shows chronic interstitial nephritis with hypertensive changes. Likely cause NSAIDs     Isolated proteinuria 8/9/2017    Nonnephrotic, being quantified. 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    Appears to be primary    MVP (mitral valve prolapse)     Neck pain     Nephrotic syndrome 10/7/2020    NSAID long-term use 8/9/2017    Primary osteoarthritis of hand 8/9/2017    Urination, excessive at night         Past SurgicalHistory:     Past Surgical History:   Procedure Laterality Date    ANKLE SURGERY      COLONOSCOPY      CT BIOPSY RENAL  9/22/2020    CT BIOPSY RENAL 9/22/2020 [facility]    INGUINAL HERNIA REPAIR  12/1/08    [facility]    LUMBAR DISC SURGERY      NECK SURGERY      SPINE SURGERY      TONSILLECTOMY AND ADENOIDECTOMY          Medications Prior to Admission:        Prior to Admission medications    Medication Sig Start Date End Date Taking?  Authorizing Provider   cyanocobalamin 1000 MCG/ML injection Inject 1,000 mcg into the muscle every 30 days    Historical Provider, MD   spironolactone (ALDACTONE) 25 MG tablet Take 1 tablet by mouth daily 10/7/20 1/13/21  Jackie London MD   lisinopril (PRINIVIL;ZESTRIL) 20 MG tablet Take 20 mg by mouth 2 times daily    Historical Provider, MD   tamsulosin (FLOMAX) 0.4 MG capsule TAKE 1 CAPSULE DAILY 3/14/19   Monty Myles MD Polyethyl Glycol-Propyl Glycol (SYSTANE OP) Apply to eye 3 times daily as needed Both eyes TID    Historical Provider, MD   azelastine (OPTIVAR) 0.05 % ophthalmic solution Place 1 drop into both eyes daily    Historical Provider, MD   Turmeric 500 MG CAPS Take by mouth daily    Historical Provider, MD   atorvastatin (LIPITOR) 20 MG tablet Take 20 mg by mouth daily    Historical Provider, MD   aspirin 81 MG tablet Take 81 mg by mouth daily    Historical Provider, MD   Multiple Vitamins-Minerals (OCUVITE PO) Take by mouth    Historical Provider, MD   allopurinol (ZYLOPRIM) 300 MG tablet Take 300 mg by mouth daily    Historical Provider, MD        Allergies: Other    Social History:     Tobacco:    reports that he quit smoking about 45 years ago. He has never used smokeless tobacco.  Alcohol:      reports no history of alcohol use. Drug Use:  reports no history of drug use. Family History:     Family History   Family history unknown: Yes       Review of Systems:     Positive and Negative as described in HPI. Review of Systems   Constitutional: Positive for fatigue. Negative for unexpected weight change. HENT: Negative. Eyes: Negative. Respiratory: Negative for chest tightness and shortness of breath. Cardiovascular: Negative for chest pain, palpitations and leg swelling. Gastrointestinal: Positive for constipation. Negative for abdominal pain, nausea and vomiting. Endocrine: Negative. Genitourinary: Negative for difficulty urinating. Urinary incontinence 2 episode on  during sleep. Musculoskeletal: Negative. Skin: Negative. Allergic/Immunologic: Negative. Neurological: Positive for weakness. Hematological: Negative. Psychiatric/Behavioral: Negative.         Physical Exam:   BP (!) 154/90   Pulse 65   Temp 98.2 °F (36.8 °C) (Oral)   Resp 15   Ht 5' 11\" (1.803 m)   Wt 200 lb (90.7 kg)   SpO2 97%   BMI 27.89 kg/m²   Temp (24hrs), Av.2 °F (36.8 °C), Min:98.2 °F (36.8 °C), Max:98.2 °F (36.8 °C)    No results for input(s): POCGLU in the last 72 hours. Intake/Output Summary (Last 24 hours) at 1/21/2021 1819  Last data filed at 1/21/2021 1801  Gross per 24 hour   Intake 1000 ml   Output --   Net 1000 ml       Physical Exam  Vitals signs and nursing note reviewed. Constitutional:       General: He is awake. Appearance: Normal appearance. He is overweight. HENT:      Head: Normocephalic and atraumatic. Nose: Nose normal.      Mouth/Throat:      Mouth: Mucous membranes are moist.   Eyes:      Extraocular Movements: Extraocular movements intact. Pupils: Pupils are equal, round, and reactive to light. Neck:      Musculoskeletal: Normal range of motion and neck supple. Cardiovascular:      Rate and Rhythm: Normal rate and regular rhythm. Pulses: Normal pulses. Heart sounds: Normal heart sounds. Pulmonary:      Effort: Pulmonary effort is normal.      Breath sounds: Normal breath sounds. Neurological:      Mental Status: He is alert. Psychiatric:         Behavior: Behavior is cooperative.          Investigations:     Laboratory Testing:  Recent Results (from the past 24 hour(s))   EKG 12 Lead    Collection Time: 01/21/21  2:04 PM   Result Value Ref Range    Ventricular Rate 72 BPM    Atrial Rate 72 BPM    P-R Interval 160 ms    QRS Duration 86 ms    Q-T Interval 372 ms    QTc Calculation (Bazett) 407 ms    P Axis 24 degrees    R Axis 69 degrees    T Axis 79 degrees   CBC Auto Differential    Collection Time: 01/21/21  2:15 PM   Result Value Ref Range    WBC 8.8 3.5 - 11.0 k/uL    RBC 3.99 (L) 4.5 - 5.9 m/uL    Hemoglobin 12.3 (L) 13.5 - 17.5 g/dL    Hematocrit 36.9 (L) 41 - 53 %    MCV 92.5 80 - 100 fL    MCH 30.8 26 - 34 pg    MCHC 33.2 31 - 37 g/dL    RDW 14.7 11.5 - 14.9 %    Platelets 291 210 - 671 k/uL    MPV 9.5 6.0 - 12.0 fL    NRBC Automated NOT REPORTED per 100 WBC    Differential Type NOT REPORTED     Seg Neutrophils 76 (H) 36 - 66 %    Lymphocytes 10 (L) 24 - 44 %    Monocytes 10 (H) 1 - 7 %    Eosinophils % 3 0 - 4 %    Basophils 1 0 - 2 %    Immature Granulocytes NOT REPORTED 0 %    Segs Absolute 6.70 1.3 - 9.1 k/uL    Absolute Lymph # 0.90 (L) 1.0 - 4.8 k/uL    Absolute Mono # 0.90 0.1 - 1.3 k/uL    Absolute Eos # 0.30 0.0 - 0.4 k/uL    Basophils Absolute 0.10 0.0 - 0.2 k/uL    Absolute Immature Granulocyte NOT REPORTED 0.00 - 0.30 k/uL    WBC Morphology NOT REPORTED     RBC Morphology NOT REPORTED     Platelet Estimate NOT REPORTED    Comprehensive Metabolic Panel w/ Reflex to MG    Collection Time: 01/21/21  2:15 PM   Result Value Ref Range    Glucose 99 70 - 99 mg/dL    BUN 46 (H) 8 - 23 mg/dL    CREATININE 2.25 (H) 0.70 - 1.20 mg/dL    Bun/Cre Ratio NOT REPORTED 9 - 20    Calcium 9.1 8.6 - 10.4 mg/dL    Sodium 138 135 - 144 mmol/L    Potassium 4.5 3.7 - 5.3 mmol/L    Chloride 104 98 - 107 mmol/L    CO2 22 20 - 31 mmol/L    Anion Gap 12 9 - 17 mmol/L    Alkaline Phosphatase 32 (L) 40 - 129 U/L    ALT 13 5 - 41 U/L    AST 26 <40 U/L    Total Bilirubin 0.37 0.3 - 1.2 mg/dL    Total Protein 6.4 6.4 - 8.3 g/dL    Alb 3.2 (L) 3.5 - 5.2 g/dL    Albumin/Globulin Ratio NOT REPORTED 1.0 - 2.5    GFR Non-African American 29 (L) >60 mL/min    GFR  35 (L) >60 mL/min    GFR Comment          GFR Staging NOT REPORTED    Troponin    Collection Time: 01/21/21  2:15 PM   Result Value Ref Range    Troponin, High Sensitivity 39 (H) 0 - 22 ng/L    Troponin T NOT REPORTED <0.03 ng/mL    Troponin Interp NOT REPORTED    Brain Natriuretic Peptide    Collection Time: 01/21/21  2:15 PM   Result Value Ref Range    Pro- <300 pg/mL    BNP Interpretation Pro-BNP Reference Range:    Urinalysis, reflex to microscopic    Collection Time: 01/21/21  2:15 PM   Result Value Ref Range    Color, UA YELLOW YELLOW    Turbidity UA CLEAR CLEAR    Glucose, Ur NEGATIVE NEGATIVE    Bilirubin Urine NEGATIVE NEGATIVE    Ketones, Urine NEGATIVE NEGATIVE    Specific Gravity, UA 1.018 1.000 - 1.030    Urine Hgb LARGE (A) NEGATIVE    pH, UA 5.0 5.0 - 8.0    Protein, UA 2+ (A) NEGATIVE    Urobilinogen, Urine Normal Normal    Nitrite, Urine NEGATIVE NEGATIVE    Leukocyte Esterase, Urine NEGATIVE NEGATIVE    Urinalysis Comments NOT REPORTED    Microscopic Urinalysis    Collection Time: 01/21/21  2:15 PM   Result Value Ref Range    -          WBC, UA 2 TO 5 /HPF    RBC, UA TOO NUMEROUS TO COUNT /HPF    Casts UA NOT REPORTED /LPF    Crystals, UA NOT REPORTED None /HPF    Epithelial Cells UA 0 TO 2 /HPF    Renal Epithelial, UA NOT REPORTED 0 /HPF    Bacteria, UA FEW (A) None    Mucus, UA NOT REPORTED None    Trichomonas, UA NOT REPORTED None    Amorphous, UA NOT REPORTED None    Other Observations UA NOT REPORTED NOT REQ. Yeast, UA NOT REPORTED None   Troponin    Collection Time: 01/21/21  4:11 PM   Result Value Ref Range    Troponin, High Sensitivity 34 (H) 0 - 22 ng/L    Troponin T NOT REPORTED <0.03 ng/mL    Troponin Interp NOT REPORTED        Imaging/Diagnostics:  Ct Head Wo Contrast    Result Date: 1/21/2021  EXAMINATION: CT OF THE HEAD WITHOUT CONTRAST  1/21/2021 2:27 pm TECHNIQUE: CT of the head was performed without the administration of intravenous contrast. Dose modulation, iterative reconstruction, and/or weight based adjustment of the mA/kV was utilized to reduce the radiation dose to as low as reasonably achievable. COMPARISON: None. HISTORY: ORDERING SYSTEM PROVIDED HISTORY: reported ams TECHNOLOGIST PROVIDED HISTORY: reported ams Decision Support Exception->Emergency Medical Condition (MA) Reason for Exam: hand tremors, off-balanced, ams Acuity: Acute Type of Exam: Initial FINDINGS: BRAIN/VENTRICLES: There is prominence of the ventricles and cortical sulci consistent with cortical volume loss. No midline shift. Basal cisterns are normally outlined. There is no evidence for acute infarct. No acute intra or extra-axial hemorrhage.  ORBITS: The visualized portion of the orbits demonstrate no acute abnormality. SINUSES: The visualized paranasal sinuses and mastoid air cells demonstrate no acute abnormality. SOFT TISSUES/SKULL:  No acute abnormality of the visualized skull or soft tissues. No acute intracranial abnormality. Cerebral atrophy. Assessment :      Primary Problem  Acute kidney injury superimposed on chronic kidney disease [facility])    Active Hospital Problems    Diagnosis Date Noted    Acute kidney injury superimposed on chronic kidney disease ([facility] [address] [N17.9, N18.9] 01/21/2021    Urinary incontinence [R32] 01/21/2021    Gait disturbance [R26.9] 01/21/2021    BPH (benign prostatic hyperplasia) [N40.0] 01/21/2021    Weakness [R53.1] 10/07/2020    CKD (chronic kidney disease), stage III [N18.30] 08/09/2017    Essential hypertension [I10] 08/09/2017       Plan:     Patient status Admit as inpatient in the  [facility]    Acute on chronic kidney disease stage III  Cr: 2.25 (baseline 1.6), BUN: 46  GFR: 29   ml/hr. Troponin 39-> 34 (trending down)  FENa pending  Nephrology consulted. Hyperlipidemia  Lipitor 20 mg    DVT prophylaxis: Heparin (creatinine clearance 32 mL/min). GI prophylaxis: Pepcid 20 mg twice daily. SW: Discharge planning. Consultations:   IP CONSULT TO NEPHROLOGY  IP CONSULT TO INTERNAL MEDICINE  IP CONSULT TO SOCIAL WORK     Patient is admitted as inpatient status because of co-morbiditieslisted above, severity of signs and symptoms as outlined, requirement for current medical therapies and most importantly because of direct risk to patient if care not provided in a hospital setting.     Nilam Cheng MD  1/21/2021  6:19 PM    Copy sent to Dr. Cathi Saleem

## 2021-01-21 NOTE — ED NOTES
Admission Dx: [facility]    Pts Chief Complaints on Arrival: confusion    ADL's - Partial assistance    Pending Diagnostics:  NOne    Residence PTA: home    Special Considerations/Circumstances:  None    Vitals: Current vital signs:  BP (!) 154/90   Pulse 65   Temp 98.2 °F (36.8 °C) (Oral)   Resp 15   Ht 5' 11\" (1.803 m)   Wt 200 lb (90.7 kg)   SpO2 97%   BMI 27.89 kg/m²                               River Jay RN  01/21/21 0942

## 2021-01-21 NOTE — ED NOTES
Report given to Maribel Lockhart RN from [employer]. Report method by phone   The following was reviewed with receiving RN:   Current vital signs:  BP (!) 154/90   Pulse 65   Temp 98.2 °F (36.8 °C) (Oral)   Resp 15   Ht 5' 11\" (1.803 m)   Wt 200 lb (90.7 kg)   SpO2 97%   BMI 27.89 kg/m²                      Any medication or safety alerts were reviewed. Any pending diagnostics and notifications were also reviewed, as well as any safety concerns or issues, abnormal labs, abnormal imaging, and abnormal assessment findings. Questions were answered.             Devyn Rasheed RN  01/21/21 [id number]

## 2021-01-21 NOTE — ED NOTES
Report given to James Strong RN from ED. Report method in person   The following was reviewed with receiving RN:   Current vital signs:  /64   Pulse 67   Temp 98.2 °F (36.8 °C) (Oral)   Resp 20   Ht 5' 11\" (1.803 m)   Wt 200 lb (90.7 kg)   SpO2 97%   BMI 27.89 kg/m²                      Any medication or safety alerts were reviewed. Any pending diagnostics and notifications were also reviewed, as well as any safety concerns or issues, abnormal labs, abnormal imaging, and abnormal assessment findings. Questions were answered.             Petrona Mooney RN  01/21/21 [id number]

## 2021-01-22 ENCOUNTER — APPOINTMENT (OUTPATIENT)
Dept: GENERAL RADIOLOGY | Age: 76
DRG: 641 | End: 2021-01-22
Payer: MEDICARE

## 2021-01-22 VITALS
RESPIRATION RATE: 16 BRPM | WEIGHT: 200 LBS | OXYGEN SATURATION: 96 % | HEIGHT: 71 IN | BODY MASS INDEX: 28 KG/M2 | HEART RATE: 81 BPM | SYSTOLIC BLOOD PRESSURE: 129 MMHG | TEMPERATURE: 97.9 F | DIASTOLIC BLOOD PRESSURE: 96 MMHG

## 2021-01-22 LAB
ABSOLUTE EOS #: 0.4 K/UL (ref 0–0.4)
ABSOLUTE IMMATURE GRANULOCYTE: ABNORMAL K/UL (ref 0–0.3)
ABSOLUTE LYMPH #: 1.1 K/UL (ref 1–4.8)
ABSOLUTE MONO #: 0.8 K/UL (ref 0.1–1.3)
ANION GAP SERPL CALCULATED.3IONS-SCNC: 12 MMOL/L (ref 9–17)
BASOPHILS # BLD: 0 % (ref 0–2)
BASOPHILS ABSOLUTE: 0 K/UL (ref 0–0.2)
BUN BLDV-MCNC: 38 MG/DL (ref 8–23)
BUN/CREAT BLD: ABNORMAL (ref 9–20)
CALCIUM SERPL-MCNC: 8.7 MG/DL (ref 8.6–10.4)
CHLORIDE BLD-SCNC: 112 MMOL/L (ref 98–107)
CO2: 18 MMOL/L (ref 20–31)
CREAT SERPL-MCNC: 1.75 MG/DL (ref 0.7–1.2)
DIFFERENTIAL TYPE: ABNORMAL
EKG ATRIAL RATE: 72 BPM
EKG P AXIS: 24 DEGREES
EKG P-R INTERVAL: 160 MS
EKG Q-T INTERVAL: 372 MS
EKG QRS DURATION: 86 MS
EKG QTC CALCULATION (BAZETT): 407 MS
EKG R AXIS: 69 DEGREES
EKG T AXIS: 79 DEGREES
EKG VENTRICULAR RATE: 72 BPM
EOSINOPHILS RELATIVE PERCENT: 4 % (ref 0–4)
FERRITIN: 555 UG/L (ref 30–400)
FOLATE: 9.5 NG/ML
GFR AFRICAN AMERICAN: 46 ML/MIN
GFR NON-AFRICAN AMERICAN: 38 ML/MIN
GFR SERPL CREATININE-BSD FRML MDRD: ABNORMAL ML/MIN/{1.73_M2}
GFR SERPL CREATININE-BSD FRML MDRD: ABNORMAL ML/MIN/{1.73_M2}
GLUCOSE BLD-MCNC: 87 MG/DL (ref 70–99)
HCT VFR BLD CALC: 35.6 % (ref 41–53)
HEMOGLOBIN: 11.9 G/DL (ref 13.5–17.5)
IMMATURE GRANULOCYTES: ABNORMAL %
IRON SATURATION: 44 % (ref 20–55)
IRON: 68 UG/DL (ref 59–158)
LYMPHOCYTES # BLD: 14 % (ref 24–44)
MCH RBC QN AUTO: 31.2 PG (ref 26–34)
MCHC RBC AUTO-ENTMCNC: 33.4 G/DL (ref 31–37)
MCV RBC AUTO: 93.5 FL (ref 80–100)
MONOCYTES # BLD: 10 % (ref 1–7)
NRBC AUTOMATED: ABNORMAL PER 100 WBC
PDW BLD-RTO: 14.5 % (ref 11.5–14.9)
PLATELET # BLD: 171 K/UL (ref 150–450)
PLATELET ESTIMATE: ABNORMAL
PMV BLD AUTO: 9.2 FL (ref 6–12)
POTASSIUM SERPL-SCNC: 4.2 MMOL/L (ref 3.7–5.3)
RBC # BLD: 3.81 M/UL (ref 4.5–5.9)
RBC # BLD: ABNORMAL 10*6/UL
SEG NEUTROPHILS: 72 % (ref 36–66)
SEGMENTED NEUTROPHILS ABSOLUTE COUNT: 5.9 K/UL (ref 1.3–9.1)
SODIUM BLD-SCNC: 142 MMOL/L (ref 135–144)
TOTAL IRON BINDING CAPACITY: 155 UG/DL (ref 250–450)
TSH SERPL DL<=0.05 MIU/L-ACNC: 0.68 MIU/L (ref 0.3–5)
UNSATURATED IRON BINDING CAPACITY: 87 UG/DL (ref 112–347)
VITAMIN B-12: 964 PG/ML (ref 232–1245)
WBC # BLD: 8.1 K/UL (ref 3.5–11)
WBC # BLD: ABNORMAL 10*3/UL

## 2021-01-22 PROCEDURE — 6370000000 HC RX 637 (ALT 250 FOR IP): Performed by: STUDENT IN AN ORGANIZED HEALTH CARE EDUCATION/TRAINING PROGRAM

## 2021-01-22 PROCEDURE — 84443 ASSAY THYROID STIM HORMONE: CPT

## 2021-01-22 PROCEDURE — 99223 1ST HOSP IP/OBS HIGH 75: CPT | Performed by: INTERNAL MEDICINE

## 2021-01-22 PROCEDURE — 82746 ASSAY OF FOLIC ACID SERUM: CPT

## 2021-01-22 PROCEDURE — 82607 VITAMIN B-12: CPT

## 2021-01-22 PROCEDURE — 51798 US URINE CAPACITY MEASURE: CPT

## 2021-01-22 PROCEDURE — 71045 X-RAY EXAM CHEST 1 VIEW: CPT

## 2021-01-22 PROCEDURE — 36415 COLL VENOUS BLD VENIPUNCTURE: CPT

## 2021-01-22 PROCEDURE — 97161 PT EVAL LOW COMPLEX 20 MIN: CPT

## 2021-01-22 PROCEDURE — 85025 COMPLETE CBC W/AUTO DIFF WBC: CPT

## 2021-01-22 PROCEDURE — 82728 ASSAY OF FERRITIN: CPT

## 2021-01-22 PROCEDURE — 83550 IRON BINDING TEST: CPT

## 2021-01-22 PROCEDURE — 83540 ASSAY OF IRON: CPT

## 2021-01-22 PROCEDURE — 80048 BASIC METABOLIC PNL TOTAL CA: CPT

## 2021-01-22 PROCEDURE — 97530 THERAPEUTIC ACTIVITIES: CPT

## 2021-01-22 RX ADMIN — SPIRONOLACTONE 25 MG: 25 TABLET, FILM COATED ORAL at 09:20

## 2021-01-22 RX ADMIN — FAMOTIDINE 20 MG: 20 TABLET, FILM COATED ORAL at 09:20

## 2021-01-22 RX ADMIN — ATORVASTATIN CALCIUM 20 MG: 20 TABLET, FILM COATED ORAL at 09:20

## 2021-01-22 RX ADMIN — ASPIRIN 81 MG: 81 TABLET, COATED ORAL at 09:20

## 2021-01-22 NOTE — PROGRESS NOTES
[id number]    PROGRESS NOTE             1/22/2021    7:27 AM    Name:   Belen Luna  MRN:     [id number]     Acct:      [de-identified]   Room:   2069/2069-01   Day:  1  Admit Date:  1/21/2021  1:42 PM    PCP:  Marcel Murphy Host  Code Status:  Full Code    Subjective:     C/C:   Chief Complaint   Patient presents with    Altered Mental Status    Gait Problem     Interval History Status: not changed. Patient seen and examined at bedside this AM.  Patient had episodes of confusion and agitation overnight. 25 mg Seroquel prescribed for agitation. Afebrile, VSS. This AM patient is alert and oriented x 2 to person and place. Does not remember why he came to the hospital, is visibly agitated and saying he needs to find his wife. Says he feels well and has no complaints or issues. Gets very angry when asking if he has had recent falls, balance issues, etc and denies all. Denies headache, chest pain, abdominal pain, SOB, difficulty urinating. Brief History:     The patient is a 67 y.o. Non-/non  male who presents withAltered Mental Status and Gait Problem   and he is admitted to the hospital for the management of    Acute on chronic kidney disease.     13-year-old  male with past medical history of chronic kidney disease stage III, dementia and galantamine since past few months, BPH, essential hypertension, nephrotic syndrome, isolated proteinuria and fall presented to the emergency department with complaint of gait disturbance starting on 1/19 Tuesday evening. According to patient's wife, the patient loses his balance while walking down the stairs. Patient denies any feeling of dizziness or lightheadedness. He also had 2 episodes of urinary incontinence during sleep. He states that after urination he has urine drops dribbling.   He has history of BPH and is following up with a urologist.  He denies any urine leakage with cough or sneezing. He also denies any burning sensation in the urine or any recent fever or abdominal pain. Patient states that he has chronic constipation and take over-the-counter laxatives.     Patient was able to recall all his information and was able to give good amount of history. CT scan was negative for any acute abnormality except cerebral atrophic changes. Review of Systems:     Review of Systems   Unable to perform ROS: Mental status change   Unable to conduct full ROS due to AMS, patient does not respond to most ROS questions but did deny headache, chest pain, shortness of breath, abdominal pain, and difficulty urinating. Medications: Allergies:     Allergies   Allergen Reactions    Other      \"HAY FEVER\"       Current Meds:   Scheduled Meds:    spironolactone  25 mg Oral Daily    atorvastatin  20 mg Oral Daily    aspirin  81 mg Oral Daily    sodium chloride flush  10 mL Intravenous 2 times per day    famotidine  20 mg Oral Daily    heparin (porcine)  5,000 Units Subcutaneous 3 times per day    QUEtiapine  25 mg Oral Nightly     Continuous Infusions:    sodium chloride 100 mL/hr at 01/21/21 1922     PRN Meds: sodium chloride flush, promethazine **OR** ondansetron, polyethylene glycol, acetaminophen **OR** acetaminophen, potassium chloride **OR** potassium alternative oral replacement **OR** potassium chloride    Data:     Past Medical History:   has a past medical history of Arthritis, Aspirin long-term use, Back pain, Chronic fatigue, Chronic kidney disease, CKD (chronic kidney disease), stage III, Essential hypertension, Fall at home, Family history of prostate problems, Heart disease, Hiatal hernia, HTN (hypertension), Hyperlipemia, Interstitial nephritis chronic, Isolated proteinuria, Mitral valve prolapse, MPGN (membranoproliferative glomerulonephritis), type 1, MVP (mitral valve prolapse), Neck pain, Nephrotic syndrome, NSAID long-term use, Primary osteoarthritis of hand, and Urination, excessive at night. Social History:   reports that he quit smoking about 45 years ago. He has never used smokeless tobacco. He reports that he does not drink alcohol or use drugs. Family History:   Family History   Family history unknown: Yes       Vitals:  BP (!) 153/77   Pulse 84   Temp 97.9 °F (36.6 °C) (Oral)   Resp 19   Ht 5' 11\" (1.803 m)   Wt 200 lb (90.7 kg)   SpO2 96%   BMI 27.89 kg/m²   Temp (24hrs), Av.8 °F (36.6 °C), Min:97.4 °F (36.3 °C), Max:98.2 °F (36.8 °C)    No results for input(s): POCGLU in the last 72 hours. I/O(24Hr):     Intake/Output Summary (Last 24 hours) at 2021 0727  Last data filed at 2021 0726  Gross per 24 hour   Intake 2200 ml   Output 650 ml   Net 1550 ml       Labs:    CBC with Differential:    Lab Results   Component Value Date    WBC 8.1 2021    RBC 3.81 2021    HGB 11.9 2021    HCT 35.6 2021     2021    MCV 93.5 2021    MCH 31.2 2021    MCHC 33.4 2021    RDW 14.5 2021    LYMPHOPCT 14 2021    MONOPCT 10 2021    BASOPCT 0 2021    MONOSABS 0.80 2021    LYMPHSABS 1.10 2021    EOSABS 0.40 2021    BASOSABS 0.00 2021    DIFFTYPE NOT REPORTED 2021     CMP:    Lab Results   Component Value Date     2021    K 4.2 2021     2021    CO2 18 2021    BUN 38 2021    CREATININE 1.75 2021    GFRAA 46 2021    LABGLOM 38 2021    GLUCOSE 87 2021    PROT 6.4 2021    LABALBU 3.2 2021    CALCIUM 8.7 2021    BILITOT 0.37 2021    ALKPHOS 32 2021    AST 26 2021    ALT 13 2021       No results found for: SPECIAL  No results found for: CULTURE      Radiology:    Ct Head Wo Contrast    Result Date: 2021  EXAMINATION: CT OF THE HEAD WITHOUT CONTRAST  2021 2:27 pm TECHNIQUE: CT of the head was performed without the administration of intravenous contrast. Dose modulation, iterative reconstruction, and/or weight based adjustment of the mA/kV was utilized to reduce the radiation dose to as low as reasonably achievable. COMPARISON: None. HISTORY: ORDERING SYSTEM PROVIDED HISTORY: reported ams TECHNOLOGIST PROVIDED HISTORY: reported ams Decision Support Exception->Emergency Medical Condition (MA) Reason for Exam: hand tremors, off-balanced, ams Acuity: Acute Type of Exam: Initial FINDINGS: BRAIN/VENTRICLES: There is prominence of the ventricles and cortical sulci consistent with cortical volume loss. No midline shift. Basal cisterns are normally outlined. There is no evidence for acute infarct. No acute intra or extra-axial hemorrhage. ORBITS: The visualized portion of the orbits demonstrate no acute abnormality. SINUSES: The visualized paranasal sinuses and mastoid air cells demonstrate no acute abnormality. SOFT TISSUES/SKULL:  No acute abnormality of the visualized skull or soft tissues. No acute intracranial abnormality. Cerebral atrophy. Physical Examination:        Physical Exam  Constitutional:       General: He is awake. He is not in acute distress. Appearance: He is not ill-appearing. HENT:      Head: Normocephalic and atraumatic. Eyes:      General: No scleral icterus. Extraocular Movements: Extraocular movements intact. Conjunctiva/sclera: Conjunctivae normal.      Pupils: Pupils are equal, round, and reactive to light. Cardiovascular:      Rate and Rhythm: Normal rate and regular rhythm. Pulses: Normal pulses. Heart sounds: Normal heart sounds. No murmur. No gallop. Pulmonary:      Effort: Pulmonary effort is normal. No respiratory distress. Breath sounds: Normal breath sounds. No wheezing or rales. Abdominal:      General: Bowel sounds are normal. There is no distension. Palpations: Abdomen is soft. Tenderness: There is no abdominal tenderness. There is no guarding. Musculoskeletal:         General: No tenderness. Right lower leg: No edema. Left lower leg: No edema. Skin:     General: Skin is warm and dry. Neurological:      Mental Status: He is alert. He is disoriented and confused. Cranial Nerves: No cranial nerve deficit or dysarthria. Coordination: Coordination abnormal.      Comments: Alert and oriented x 2 to person and place. Not oriented to situation. Unable to follow commands for finger to nose testing but some tremor visible. Psychiatric:         Behavior: Behavior is uncooperative.        Assessment:        Primary Problem  Acute kidney injury superimposed on chronic kidney disease [facility])    Active Hospital Problems    Diagnosis Date Noted    Acute kidney injury superimposed on chronic kidney disease ([facility] [address] [N17.9, N18.9] 01/21/2021    Urinary incontinence [R32] 01/21/2021    Gait disturbance [R26.9] 01/21/2021    BPH (benign prostatic hyperplasia) [N40.0] 01/21/2021    Weakness [R53.1] 10/07/2020    CKD (chronic kidney disease), stage III [N18.30] 08/09/2017    Essential hypertension [I10] 08/09/2017       Plan:        Acute on chronic kidney disease, stage III  - Per chart review patient has hx of possible MPGN  - Baseline Cr 1.6, 2.25 on admission; Cr downtrending to 1.75 today  - BUN:Cr 20.4; GFR 29  - UA showed hematuria, hemoglobinuria, and proteinuria but no infection  -  ml/hr  - Troponin 39 -> 34  - FENa 1.0% suggestive of prerenal etiology  - Nephrology consulted, appreciate further recs    Balance disturbance and memory loss  - CT head w/o contrast showed no acute intracranial abnormality with cerebral atrophy  - TSH, B12, folate pending  - Patient started on seroquel nightly for agitation, do not prescribe on discharge    Normocytic anemia  - Baseline ~13-14  - 12.3 on admission  - Iron studies, B12/folate pending    Hyperlipidemia  - Lipitor 20 mg  - ASA 81 mg daily    Hypertension  - Aldactone 25 mg PO daily    MIVF: 100 ml/hr  Diet: renal diet  DVT prophylaxis: heparin 5000U TID  GI prophylaxis: pepcid 20 mg BID  PT/OT/SW      Landry Gonzales MD  1/22/2021  7:27 AM

## 2021-01-22 NOTE — PLAN OF CARE
Nutrition Problem #1: Predicted inadequate energy intake  Intervention: Food and/or Nutrient Delivery: Continue Current Diet  Nutritional Goals: Meet greater than 75% of estimated nutrition needs

## 2021-01-22 NOTE — PROGRESS NOTES
Message left for pt's wife regarding discharge. Addendum: SW spoke to pt's wife. She will take pt home.

## 2021-01-22 NOTE — CARE COORDINATION
release capsule 8 mg   Take 8 mg by mouth daily (with breakfast)       cyanocobalamin 1000 MCG/ML injection 1,000 mcg   Inject 1,000 mcg into the muscle every 30 days       spironolactone (ALDACTONE) 25 MG tablet 25 mg   Take 1 tablet by mouth daily   Quantity: 90 tablet Refills: 3       tamsulosin (FLOMAX) 0.4 MG capsule    TAKE 1 CAPSULE DAILY   Quantity: 90 capsule Refills: 3       Polyethyl Glycol-Propyl Glycol (SYSTANE OP)    Apply to eye 3 times daily as needed Both eyes TID       azelastine (OPTIVAR) 0.05 % ophthalmic solution 1 drop   Place 1 drop into both eyes daily       Turmeric 500 MG CAPS    Take by mouth daily       atorvastatin (LIPITOR) 20 MG tablet 20 mg   Take 20 mg by mouth daily       aspirin 81 MG tablet 81 mg   Take 81 mg by mouth daily       Multiple Vitamins-Minerals (OCUVITE PO)    Take by mouth       allopurinol (ZYLOPRIM) 300 MG tablet 300 mg   Take 300 mg by mouth daily           STOP taking these previous medications    Medication Dose Reason for Stopping Comments   (STOP TAKING) lisinopril (PRINIVIL;ZESTRIL) 20 MG tablet 20 mg       Continuity of Care Form    Patient Name: Alexandre Grullon   :  1945  MRN:  [id number]    Admit date:  2021  Discharge date:  2021    Code Status Order: Full Code   Advance Directives:   Advance Care Flowsheet Documentation       Date/Time Healthcare Directive Type of Healthcare Directive Copy in [facility] [address] Agent's Name Healthcare Agent's Phone Number    21 1831  No, patient does not have an advance directive for healthcare treatment -- -- -- -- --            Admitting Physician:  Jo Echevarria MD  PCP: Juarez May Host    Discharging Nurse: [facility] Unit/Room#: 2069/2069-01  Discharging Unit Phone Number: [phone]    Emergency Contact:   Extended Emergency Contact Information  Primary Emergency Contact: Aditi Villa  Address: [address] [place] [address] Rena Solano, [address]  Home Phone: [phone]  Relation: Spouse  Secondary Emergency Contact: Jeanmarie Perales  Address: [address] [place] [address] , [address] Phone: [phone]  Relation: Child    Past Surgical History:  Past Surgical History:   Procedure Laterality Date    ANKLE SURGERY      COLONOSCOPY      CT BIOPSY RENAL  9/22/2020    CT BIOPSY RENAL 9/22/2020 [facility]    INGUINAL HERNIA REPAIR  12/1/08    [facility]    LUMBAR DISC SURGERY      NECK SURGERY      SPINE SURGERY      TONSILLECTOMY AND ADENOIDECTOMY         Immunization History: There is no immunization history on file for this patient.     Active Problems:  Patient Active Problem List   Diagnosis Code    History of left inguinal hernia Z87.19    CKD (chronic kidney disease), stage III N18.30    Isolated proteinuria R80.0    NSAID long-term use Z79.1    Essential hypertension I10    Primary osteoarthritis of hand M19.049    Mitral valve prolapse I34.1    Interstitial nephritis chronic N11.9    MPGN (membranoproliferative glomerulonephritis), type 1 N05.5    Nephrotic syndrome N04.9    Weakness R53.1    MILAGRO (acute kidney injury) ([facility] [address] N17.9    Urinary incontinence R32    Gait disturbance R26.9    BPH (benign prostatic hyperplasia) N40.0       Isolation/Infection:   Isolation            No Isolation          Patient Infection Status       None to display            Nurse Assessment:  Last Vital Signs: BP (!) 129/96   Pulse 81   Temp 97.9 °F (36.6 °C)   Resp 16   Ht 5' 11\" (1.803 m)   Wt 200 lb (90.7 kg)   SpO2 96%   BMI 27.89 kg/m²     Last documented pain score (0-10 scale): Pain Level: 0  Last Weight:   Wt Readings from Last 1 Encounters:   01/21/21 200 lb (90.7 kg)     Mental Status:  oriented overall; has dementia; did come into hospital with altered mental status    IV Access:  - None    Nursing Mobility/ADLs:  Walking   Assisted  Transfer  Assisted  Bathing  Assisted  Dressing  Assisted  Toileting Assisted  Feeding  Independent  Med Admin  Assisted  Med Delivery   whole    Wound Care Documentation and Therapy:        Elimination:  Continence:   · Bowel: Yes  · Bladder: Yes  Urinary Catheter: None   Colostomy/Ileostomy/Ileal Conduit: No         Safety Concerns: At Risk for Falls    Impairments/Disabilities:      None    Nutrition Therapy:  Current Nutrition Therapy:   - Oral Diet:  Renal    Routes of Feeding: Oral  Liquids: No Restrictions  Daily Fluid Restriction: no  Last Modified Barium Swallow with Video (Video Swallowing Test): not done    Treatments at the Time of Hospital Discharge:   Respiratory Treatments: See discharge orders  Oxygen Therapy:  is not on home oxygen therapy. Ventilator:    - No ventilator support    Rehab Therapies: Physical Therapy and Occupational Therapy  Weight Bearing Status/Restrictions: No weight bearing restirctions  Other Medical Equipment (for information only, NOT a DME order):  Having [address] deliver rolling walker to home  Other Treatments: Skilled Nursing Assessment; Medication Education and Monitor    Home health care agency's  to evaluate patient two weeks prior to discharge from home health to determine post-discharge services.        Patient's personal belongings (please select all that are sent with patient):      RN SIGNATURE:  Electronically signed by Keith Helms RN on 1/22/21 at 5:24 PM EST    CASE MANAGEMENT/SOCIAL WORK SECTION    Inpatient Status Date: 1/21/2021    Readmission Risk Assessment Score:  Readmission Risk              Risk of Unplanned Readmission:        15           Discharging to Facility/ [address]  Phone: [phone]  Fax [phone]          / signature: Electronically signed by Keith Helms RN on 1/22/21 at 3:24 PM EST    PHYSICIAN SECTION    Prognosis: Good    Condition at Discharge: Stable    Rehab Potential (if transferring to Rehab): Good    Recommended Labs or Other Treatments After Discharge:     Physician Certification: I certify the above information and transfer of Magalis Garcia  is necessary for the continuing treatment of the diagnosis listed and that he requires [address] for less 30 days.      Update Admission H&P: No change in H&P    PHYSICIAN SIGNATURE:  Electronically signed by Sandy Phillips MD on 1/22/21 at 3:47 PM EST

## 2021-01-22 NOTE — PROGRESS NOTES
Pt continuously climbing out of bed and wanting to go home. Pt moved to room 2069 to be closer to the nurse's station for patient safety. All belongings brought with pt to room 2069. Bed alarm activated. Will continue to monitor.

## 2021-01-22 NOTE — DISCHARGE INSTR - COC
Continuity of Care Form    Patient Name: Kandi Castillo   :  1945  MRN:  [id number]    Admit date:  2021  Discharge date:  2021    Code Status Order: Full Code   Advance Directives:   Advance Care Flowsheet Documentation       Date/Time Healthcare Directive Type of Healthcare Directive Copy in [address] Agent's Name Healthcare Agent's Phone Number    21 1834  No, patient does not have an advance directive for healthcare treatment -- -- -- -- --            Admitting Physician:  Samina Rodríguez MD  PCP: Severiano Carder    Discharging Nurse: [facility] Unit/Room#: 2069/2069-01  Discharging Unit Phone Number: [phone]    Emergency Contact:   Extended Emergency Contact Information  Primary Emergency Contact: Tru Solis  Address: [facility] [address]  [place] Phone: [phone]  Relation: Spouse  Secondary Emergency Contact: Jeanmarie Perales  Address: [facility] [address] Phone: [phone]  Relation: Child    Past Surgical History:  Past Surgical History:   Procedure Laterality Date    ANKLE SURGERY      COLONOSCOPY      CT BIOPSY RENAL  2020    CT BIOPSY RENAL 2020 [facility]    INGUINAL HERNIA REPAIR  08    [facility]    LUMBAR DISC SURGERY      NECK SURGERY      SPINE SURGERY      TONSILLECTOMY AND ADENOIDECTOMY         Immunization History: There is no immunization history on file for this patient.     Active Problems:  Patient Active Problem List   Diagnosis Code    History of left inguinal hernia Z87.19    CKD (chronic kidney disease), stage III N18.30    Isolated proteinuria R80.0    NSAID long-term use Z79.1    Essential hypertension I10    Primary osteoarthritis of hand M19.049    Mitral valve prolapse I34.1    Interstitial nephritis chronic N11.9    MPGN (membranoproliferative glomerulonephritis), type 1 N05.5    Nephrotic syndrome N04.9    Weakness R53.1    MILAGRO (acute kidney injury) ([facility] [address] N17.9    Urinary incontinence R32    Gait disturbance R26.9    BPH (benign prostatic hyperplasia) N40.0       Isolation/Infection:   Isolation            No Isolation          Patient Infection Status       None to display            Nurse Assessment:  Last Vital Signs: BP (!) 129/96   Pulse 81   Temp 97.9 °F (36.6 °C)   Resp 16   Ht 5' 11\" (1.803 m)   Wt 200 lb (90.7 kg)   SpO2 96%   BMI 27.89 kg/m²     Last documented pain score (0-10 scale): Pain Level: 0  Last Weight:   Wt Readings from Last 1 Encounters:   01/21/21 200 lb (90.7 kg)     Mental Status:  oriented overall; has dementia; did come into hospital with altered mental status    IV Access:  - None    Nursing Mobility/ADLs:  Walking   Assisted  Transfer  Assisted  Bathing  Assisted  Dressing  Assisted  Toileting  Assisted  Feeding  Independent  Med Admin  Assisted  Med Delivery   whole    Wound Care Documentation and Therapy:        Elimination:  Continence:   · Bowel: Yes  · Bladder: Yes  Urinary Catheter: None   Colostomy/Ileostomy/Ileal Conduit: No         Safety Concerns: At Risk for Falls    Impairments/Disabilities:      None    Nutrition Therapy:  Current Nutrition Therapy:   - Oral Diet:  Renal    Routes of Feeding: Oral  Liquids: No Restrictions  Daily Fluid Restriction: no  Last Modified Barium Swallow with Video (Video Swallowing Test): not done    Treatments at the Time of Hospital Discharge:   Respiratory Treatments: See discharge orders  Oxygen Therapy:  is not on home oxygen therapy.   Ventilator:    - No ventilator support    Rehab Therapies: Physical Therapy and Occupational Therapy  Weight Bearing Status/Restrictions: No weight bearing restirctions  Other Medical Equipment (for information only, NOT a DME order):  Having [address] deliver rolling walker to home  Other Treatments: Skilled Nursing Assessment; Medication Education and Monitor    Home health care agency's  to evaluate patient two weeks prior to discharge from home health to determine post-discharge services. Patient's personal belongings (please select all that are sent with patient):      RN SIGNATURE:  Electronically signed by Charmaine Gonzalez RN on 1/22/21 at 5:24 PM EST    CASE MANAGEMENT/SOCIAL WORK SECTION    Inpatient Status Date: 1/21/2021    Readmission Risk Assessment Score:  Readmission Risk              Risk of Unplanned Readmission:        15           Discharging to Facility/ [address]  Phone: [phone]  Fax [phone]          / signature: Electronically signed by Charmaine Gonzalez RN on 1/22/21 at 3:24 PM EST    PHYSICIAN SECTION    Prognosis: Good    Condition at Discharge: Stable    Rehab Potential (if transferring to Rehab): Good    Recommended Labs or Other Treatments After Discharge:     Physician Certification: I certify the above information and transfer of Cyndee Bloch  is necessary for the continuing treatment of the diagnosis listed and that he requires [address] for less 30 days.      Update Admission H&P: No change in H&P    PHYSICIAN SIGNATURE:  Electronically signed by Elizabeth Carrasco MD on 1/22/21 at 3:47 PM EST

## 2021-01-22 NOTE — PROGRESS NOTES
RN spoke with Dr. Bradley Frazier at this time regarding pt continued agitation and confusion. New orders received for 25 mg seroquel PO nightly. Will continue to monitor. TBA maximum assist (25% patients effort)

## 2021-01-22 NOTE — PROGRESS NOTES
Anel Cabezas was evaluated today and a DME order was entered for a wheeled walker because he requires this to successfully complete daily living tasks of ambulating. A wheeled walker is necessary due to the patient's unsteady gait, upper body weakness, and inability to  an ambulation device; and he can ambulate only by pushing a walker instead of a lesser assistive device such as a cane, crutch, or standard walker. The need for this equipment was discussed with the patient and he understands and is in agreement.     Electronically signed by Baltazar Deluna MD on 1/22/21 at 3:44 PM EST

## 2021-01-22 NOTE — DISCHARGE SUMMARY
[address]    Discharge Summary     Patient ID: Nii Rob  :  1945   MRN: [id number]     ACCOUNT:  [de-identified]   Patient's PCP: Nahomi Becerra  Admit Date: 2021   Discharge Date: 2021   Length of Stay: 1  Code Status:  Full Code  Admitting Physician: Elisabeth Burroughs MD  Discharge Physician: Hank Taylor MD     Active Discharge Diagnoses:       Primary Problem  MILAGRO (acute kidney injury) [facility])      [facility] Problems    Diagnosis Date Noted    MILAGRO (acute kidney injury) ([facility] [address] [facility] [N17.9] 2021    Urinary incontinence [R32] 2021    Gait disturbance [R26.9] 2021    BPH (benign prostatic hyperplasia) [N40.0] 2021    Weakness [R53.1] 10/07/2020    CKD (chronic kidney disease), stage III [N18.30] 2017    Essential hypertension [I10] 2017       Admission Condition:  poor     Discharged Condition: fair    Hospital Stay:       Hospital Course:  Nii Rob is a 67 y.o. male who was admitted for the management of MILAGRO (acute kidney [facility]) ([facility] Wife brought patient to the ED due to worsening confusion / gait disturbances for the past few weeks. In the ED CT head was done which showed no acute changes and cerebral atrophy. ED workup also showed an MILAGRO on CKD. Nephrology consulted who wanted patient admitted for fluids. MILAGRO improved back to baseline overnight with IVF hydration. No sources of infection found on UA or CXR. Other labs including TSH, B12/folate were normal.  Patient's mentation improved by the next day. He was alert and oriented x 3. Wife came and saw patient and agreed he was back to his baseline. Likely cause of MILAGRO and AMS dehydration as both improved with fluids. Wife did not want patient to go to a nursing home and since his mentation was back to baseline she wanted him to come home and stated she is able to take care of him.   PT/OT evaluated patient, DME placed for a walker, and case management coordinating VNS. Nephrology cleared patient for discharge with follow up with Dr. Ludivina Sellers in 1-2 weeks and repeat BMP in 1 week. Patient is to avoid lisinopril until follow up with Dr. Ludivina Sellers. Wife and patient verbalized understanding and agreement. No questions or concerns.       Significant therapeutic interventions: CT head, IVF, UA, CXR    Significant Diagnostic Studies:   Labs / Micro:  CBC:   Lab Results   Component Value Date    WBC 8.1 01/22/2021    RBC 3.81 01/22/2021    HGB 11.9 01/22/2021    HCT 35.6 01/22/2021    MCV 93.5 01/22/2021    MCH 31.2 01/22/2021    MCHC 33.4 01/22/2021    RDW 14.5 01/22/2021     01/22/2021     BMP:    Lab Results   Component Value Date    GLUCOSE 87 01/22/2021     01/22/2021    K 4.2 01/22/2021     01/22/2021    CO2 18 01/22/2021    ANIONGAP 12 01/22/2021    BUN 38 01/22/2021    CREATININE 1.75 01/22/2021    BUNCRER NOT REPORTED 01/22/2021    CALCIUM 8.7 01/22/2021    LABGLOM 38 01/22/2021    GFRAA 46 01/22/2021    GFR      01/22/2021    GFR NOT REPORTED 01/22/2021     U/A:    Lab Results   Component Value Date    COLORU YELLOW 01/21/2021    TURBIDITY CLEAR 01/21/2021    SPECGRAV 1.018 01/21/2021    HGBUR LARGE 01/21/2021    PHUR 5.0 01/21/2021    PROTEINU 2+ 01/21/2021    GLUCOSEU NEGATIVE 01/21/2021    KETUA NEGATIVE 01/21/2021    BILIRUBINUR NEGATIVE 01/21/2021    UROBILINOGEN Normal 01/21/2021    NITRU NEGATIVE 01/21/2021    LEUKOCYTESUR NEGATIVE 01/21/2021     TSH:    Lab Results   Component Value Date    TSH 0.68 01/22/2021       Radiology:    Ct Head Wo Contrast    Result Date: 1/21/2021  EXAMINATION: CT OF THE HEAD WITHOUT CONTRAST  1/21/2021 2:27 pm TECHNIQUE: CT of the head was performed without the administration of intravenous contrast. Dose modulation, iterative reconstruction, and/or weight based adjustment of the mA/kV was utilized to reduce the radiation dose to as low as reasonably achievable. COMPARISON: None. HISTORY: ORDERING SYSTEM PROVIDED HISTORY: reported ams TECHNOLOGIST PROVIDED HISTORY: reported ams Decision Support Exception->Emergency Medical Condition (MA) Reason for Exam: hand tremors, off-balanced, ams Acuity: Acute Type of Exam: Initial FINDINGS: BRAIN/VENTRICLES: There is prominence of the ventricles and cortical sulci consistent with cortical volume loss. No midline shift. Basal cisterns are normally outlined. There is no evidence for acute infarct. No acute intra or extra-axial hemorrhage. ORBITS: The visualized portion of the orbits demonstrate no acute abnormality. SINUSES: The visualized paranasal sinuses and mastoid air cells demonstrate no acute abnormality. SOFT TISSUES/SKULL:  No acute abnormality of the visualized skull or soft tissues. No acute intracranial abnormality. Cerebral atrophy. Xr Chest Portable    Result Date: 1/22/2021  EXAMINATION: ONE XRAY VIEW OF THE CHEST 1/22/2021 2:49 pm COMPARISON: 02/22/2012 HISTORY: ORDERING SYSTEM PROVIDED HISTORY: concern for possible aspiration pneumonia TECHNOLOGIST PROVIDED HISTORY: concern for possible aspiration pneumonia Reason for Exam: PT CO cough with SOB. AMS Acuity: Chronic Type of Exam: Initial FINDINGS: Shallow inflation and rotation are noted. No focal airspace disease identified. No evidence for pneumothorax. No significant effusion appreciated. The cardiac and mediastinal contours appear within normal limits. No acute osseous abnormality identified. No acute airspace disease identified. Consultations:    Consults:     Final Specialist Recommendations/Findings:   IP CONSULT TO INTERNAL MEDICINE  IP CONSULT TO SOCIAL WORK  IP CONSULT TO NEPHROLOGY      The patient was seen and examined on day of discharge and this discharge summary is in conjunction with any daily progress note from day of discharge.     Discharge plan:       Disposition: Home    Physician Follow Up:     Trevor Gómez MD  [address] [place] [address]  [address]  [place] [address]    In 2 weeks  Hospital follow up for kidney function and when to resume lisinopril. Triston Acevedo Host  [address] [place] [address], [address]  [address]  [phone]    In 1 week  Hospital follow up. [facility]  Phone- [phone]    Please call with you arrive home to arrange delivery of your rolling walker. Requiring Further Evaluation/Follow Up POST HOSPITALIZATION/Incidental Findings: to discuss resuming lisinopril with nephrologist outpatient    Diet: renal diet    Activity: As tolerated    Instructions to Patient:   - Avoid taking your lisinopril until you follow up with your provider. This does not mean you will no longer need this medication but it can interfere with your kidney function. Please avoid taking lisinopril until you discuss it with Dr. Milagros Cardona.  - Take all medications as prescribed  - Follow up with PCP in 1 week  - Follow up with Dr. Milagros Cardona within 2 weeks to monitor your kidney function and discuss lisinopril  - Repeat BMP in 1 week (this is a lab that looks at your kidney function)  - In case of any worsening condition please visit Emergency Room.       Discharge Medications:      Medication List      CONTINUE taking these medications    allopurinol 300 MG tablet  Commonly known as: ZYLOPRIM     aspirin 81 MG tablet     atorvastatin 20 MG tablet  Commonly known as: LIPITOR     azelastine 0.05 % ophthalmic solution  Commonly known as: OPTIVAR     cyanocobalamin 1000 MCG/ML injection     galantamine 8 MG extended release capsule  Commonly known as: RAZADYNE ER     OCUVITE PO     spironolactone 25 MG tablet  Commonly known as: Aldactone  Take 1 tablet by mouth daily     SYSTANE OP     tamsulosin 0.4 MG capsule  Commonly known as: FLOMAX  TAKE 1 CAPSULE DAILY     Turmeric 500 MG Caps        STOP taking these medications    lisinopril 20 MG tablet  Commonly known as: PRINIVIL;ZESTRIL            Time Spent on discharge is 35 mins in patient examination, evaluation, counseling as well as medication reconciliation, prescriptions for required medications, discharge plan and follow up. Electronically signed by   Terrance Tay MD  1/22/2021  4:41 PM      Thank you Dr. Tej Hutton for the opportunity to be involved in this patient's care.

## 2021-01-22 NOTE — PLAN OF CARE
Problem: Falls - Risk of:  Goal: Will remain free from falls  Description: Will remain free from falls  1/22/2021 1702 by Radha Kumar RN  Outcome: Completed  1/22/2021 0307 by Cyrus Khan RN  Outcome: Ongoing  Note: Pt remains free from falls this shift. Bed in lowest position with wheels locked and 2/4 siderails up. Call light and bedside table within reach. Bed alarm activated. Pt moved closer to nurses' station. Will continue to monitor. 1/22/2021 0307 by Cyrus Khan RN  Outcome: Ongoing  1/22/2021 0305 by Cyrus Khan RN  Outcome: Ongoing  Note: Pt remains free from falls this shift. Bed in lowest position with wheels locked and 2/4 siderails up. Call light and bedside table within reach. Bed alarm activated. Pt moved closer to nurses' station. Will continue to monitor.    Goal: Absence of physical injury  Description: Absence of physical injury  1/22/2021 1702 by Radha Kumar RN  Outcome: Completed  1/22/2021 0307 by Cyrus Khan RN  Outcome: Ongoing  1/22/2021 0307 by Cyrus Khan RN  Outcome: Ongoing  1/22/2021 0305 by Cyrus Khan RN  Outcome: Ongoing     Problem: ABCDS Injury Assessment  Goal: Absence of physical injury  1/22/2021 1702 by Radha Kumar RN  Outcome: Completed  1/22/2021 0307 by Cyrus Khan RN  Outcome: Ongoing  1/22/2021 0307 by Cyrus Khan RN  Outcome: Ongoing  1/22/2021 0305 by Cyrus Khan RN  Outcome: Ongoing     Problem: Coping:  Goal: Ability to remain calm will improve  Description: Ability to remain calm will improve  1/22/2021 1702 by Radha Kumar RN  Outcome: Completed  1/22/2021 0307 by Cyrus Khan RN  Outcome: Ongoing  1/22/2021 0307 by Cyrus Khan RN  Outcome: Ongoing     Problem: Safety:  Goal: Ability to remain free from injury will improve  Description: Ability to remain free from injury will improve  1/22/2021 1702 by Radha Kumar RN  Outcome: Completed  1/22/2021 0307 by Cyrus Khan RN  Outcome: Ongoing  1/22/2021 0307 by Carlene Anguiano Arely Stephens RN  Outcome: Ongoing     Problem: Self-Care:  Goal: Ability to participate in self-care as condition permits will improve  Description: Ability to participate in self-care as condition permits will improve  1/22/2021 1702 by Teresa Conner RN  Outcome: Completed  1/22/2021 0307 by Abigail Dunn RN  Outcome: Ongoing  1/22/2021 0307 by Abigail Dunn RN  Outcome: Ongoing     Problem: Nutrition  Goal: Optimal nutrition therapy  Outcome: Completed

## 2021-01-22 NOTE — CONSULTS
NEPHROLOGY CONSULT     Patient :  Jett Jalloh; 67 y.o. MRN# [id number]  Location:  2069/2069-01  Attending:  Luisana Jalloh MD  Admit Date:  1/21/2021   Hospital Day: 1      Reason for Consult: MILAGRO      Chief Complaint:  Confusion. History Obtained From:  Patient, EMR, Nursing staff    History of Present Illness: This is a 67 y.o. 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 Patient presented to the hospital after wife noted to be more confused and having difficulty ambulation, was not steady while walking. Labs showed elevated scr 2.2 mg/dl. Patient denies any complains today, patient does have forgetfulness,but he is comfortable. CT head no acute pathology. UA 2+ pr,WBC 2-5, RBC numerous. L.E negative, large blood. Serologies  1/2/2021 , JONNA, ANCA negative, anti GBM negative    Medication review shows use of ACE-inhibitor/diuretics. Past Medical History:        Diagnosis Date    Arthritis     Aspirin long-term use     Back pain     Chronic fatigue 10/7/2020    Chronic kidney disease     CKD (chronic kidney disease), stage III 8/9/2017    Baseline creatinine 1.4-1.5. Proteinuria reported by primary physician. Workup in progress.  Essential hypertension 8/9/2017    Fall at home 12/09/2019    Family history of prostate problems     Heart disease     Hiatal hernia     HTN (hypertension)     Hyperlipemia     Interstitial nephritis chronic 9/28/2020    Kidney bx 9/25/20 shows chronic interstitial nephritis with hypertensive changes. Likely cause NSAIDs     Isolated proteinuria 8/9/2017    Nonnephrotic, being quantified.    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    Appears to be primary    MVP (mitral valve prolapse)     Neck pain     Nephrotic syndrome 10/7/2020    NSAID long-term use 8/9/2017    Primary osteoarthritis of hand 8/9/2017    Urination, excessive at night        Past Surgical History:        Procedure Laterality Date    ANKLE SURGERY      COLONOSCOPY      CT BIOPSY RENAL  9/22/2020    CT BIOPSY RENAL 9/22/2020 [facility]    INGUINAL HERNIA REPAIR  12/1/08    [facility]    LUMBAR DISC SURGERY      NECK SURGERY      SPINE SURGERY      TONSILLECTOMY AND ADENOIDECTOMY         Current Medications:        spironolactone (ALDACTONE) tablet 25 mg, Daily      atorvastatin (LIPITOR) tablet 20 mg, Daily      aspirin EC tablet 81 mg, Daily      sodium chloride flush 0.9 % injection 10 mL, 2 times per day      sodium chloride flush 0.9 % injection 10 mL, PRN      promethazine (PHENERGAN) tablet 12.5 mg, Q6H PRN    Or      ondansetron (ZOFRAN) injection 4 mg, Q6H PRN      polyethylene glycol (GLYCOLAX) packet 17 g, Daily PRN      acetaminophen (TYLENOL) tablet 650 mg, Q6H PRN    Or      acetaminophen (TYLENOL) suppository 650 mg, Q6H PRN      potassium chloride (KLOR-CON M) extended release tablet 40 mEq, PRN    Or      potassium bicarb-citric acid (EFFER-K) effervescent tablet 40 mEq, PRN    Or      potassium chloride 10 mEq/100 mL IVPB (Peripheral Line), PRN      0.9 % sodium chloride infusion, Continuous      famotidine (PEPCID) tablet 20 mg, Daily      heparin (porcine) injection 5,000 Units, 3 times per day      QUEtiapine (SEROQUEL) tablet 25 mg, Nightly        Allergies:   Other    Social History:   Social History     Socioeconomic History    Marital status:      Spouse name: Not on file    Number of children: Not on file    Years of education: Not on file    Highest education level: Not on file   Occupational History    Not on file   Social Needs    Financial resource strain: Not on file    Food insecurity     Worry: Not on file     Inability: Not on file    Transportation needs     Medical: Not on file     Non-medical: Not on file Tobacco Use    Smoking status: Former Smoker     Quit date: 3/17/1975     Years since quittin.8    Smokeless tobacco: Never Used   Substance and Sexual Activity    Alcohol use: No    Drug use: No    Sexual activity: Not on file   Lifestyle    Physical activity     Days per week: Not on file     Minutes per session: Not on file    Stress: Not on file   Relationships    Social connections     Talks on phone: Not on file     Gets together: Not on file     Attends Islam service: Not on file     Active member of club or organization: Not on file     Attends meetings of clubs or organizations: Not on file     Relationship status: Not on file    Intimate partner violence     Fear of current or ex partner: Not on file     Emotionally abused: Not on file     Physically abused: Not on file     Forced sexual activity: Not on file   Other Topics Concern    Not on file   Social History Narrative    Not on file       Family History:   Family History   Family history unknown: Yes       Review of Systems:    Constitutional: No fever, no chills, no night sweats, fatigue, generalized weakness, loss of appetite  HEENT:  No headache, otalgia, itchy eyes, epistaxis, nasal discharge or sore throat. Cardiac:  No chest pain, dyspnea, orthopnea or PND, palpitations  Chest:  No cough, hemoptysis, pleuritic chest pain, wheezing,SOB  Abdomen:  No abdominal pain, nausea, vomiting, diarrhea, melena, dysphagia hematemesis,constipation, abdominal bloating, flank pain  Neuro:  No CVA, TIA or seizure like activity. Skin:   No rashes, no itching. :   No hematuria, no pyuria, no dysuria, no flank pain. Extremities:  No swelling or joint pains.       Objective:  CURRENT TEMPERATURE:  Temp: 97.9 °F (36.6 °C)  MAXIMUM TEMPERATURE OVER 24HRS:  Temp (24hrs), Av.7 °F (36.5 °C), Min:97.4 °F (36.3 °C), Max:97.9 °F (36.6 °C)    CURRENT RESPIRATORY RATE:  Resp: 19  CURRENT PULSE:  Pulse: 84  CURRENT BLOOD PRESSURE:  BP: (!) 153/77  24HR BLOOD PRESSURE RANGE:  Systolic (68BGR), DVT:042 , Min:113 , HSV:256   ; Diastolic (63OGA), WNZ:45, Min:64, Max:90    24HR INTAKE/OUTPUT:      Intake/Output Summary (Last 24 hours) at 1/22/2021 1241  Last data filed at 1/22/2021 1046  Gross per 24 hour   Intake 2200 ml   Output 875 ml   Net 1325 ml     Patient Vitals for the past 96 hrs (Last 3 readings):   Weight   01/21/21 1134 200 lb (90.7 kg)       Physical Exam:  GENERAL APPEARANCE: Alert and cooperative, and appears to be in no acute distress. HEAD: normocephalic  EYES: EOMI. Not pale, anicteric   NOSE:  No nasal discharge. THROAT:  Oral cavity and pharynx normal. Moist  CARDIAC: Normal S1 and S2. No S3, S4 or murmurs. Rhythm is regular. LUNGS: normal resp effort  MUSCULOSKELETAL: Adequately aligned spine. No joint erythema or tenderness. EXTREMITIES: No edema. Peripheral pulses intact. NEURO: Non focal moving all 4 limbs      Labs:   CBC:  Recent Labs     01/21/21  1415 01/22/21  0524   WBC 8.8 8.1   RBC 3.99* 3.81*   HGB 12.3* 11.9*   HCT 36.9* 35.6*   MCV 92.5 93.5   MCH 30.8 31.2   MCHC 33.2 33.4   RDW 14.7 14.5    171   MPV 9.5 9.2      BMP:   Recent Labs     01/21/21  1415 01/22/21  0524    142   K 4.5 4.2    112*   CO2 22 18*   BUN 46* 38*   CREATININE 2.25* 1.75*   GLUCOSE 99 87   CALCIUM 9.1 8.7      Phosphorus:  No results for input(s): PHOS in the last 72 hours. Magnesium: No results for input(s): MG in the last 72 hours. Albumin:   Recent Labs     01/21/21  1415   LABALBU 3.2*       IRON:  No results for input(s): IRON in the last 72 hours. Iron Saturation:  Invalid input(s): PERCENTFE  TIBC:  No results for input(s): TIBC in the last 72 hours. FERRITIN:  No results for input(s): FERRITIN in the last 72 hours. SPEP: No results for input(s): SPEP in the last 72 hours. Recent Labs     01/21/21  1415   PROT 6.4     UPEP: No results for input(s): TPU in the last 72 hours.    Urine Sodium:    Recent Labs 01/21/21 2120   HEATHER 54      Urine Potassium: No results for input(s): KUR in the last 72 hours. Urine Chloride: Invalid input(s): CLU  Urine Ph:  Invalid input(s): PO4U  Urine Osmolarity: No results for input(s): OSMOU in the last 72 hours. Urine Creatinine:    Recent Labs     01/21/21 2120   LABCREA 92.0     Urine Eosinophils: Invalid input(s): EOSU  Urine Protein:  No results for input(s): TPU in the last 72 hours. Urinalysis:    Recent Labs     01/21/21  1415   NITRU NEGATIVE   COLORU YELLOW   PHUR 5.0   WBCUA 2 TO 5   RBCUA TOO NUMEROUS TO COUNT   MUCUS NOT REPORTED   TRICHOMONAS NOT REPORTED   YEAST NOT REPORTED   BACTERIA FEW*   SPECGRAV 1.018   LEUKOCYTESUR NEGATIVE   UROBILINOGEN Normal   BILIRUBINUR NEGATIVE   GLUCOSEU NEGATIVE   [address] NEGATIVE   AMORPHOUS NOT REPORTED         Radiology:  Reviewed as available. Assessment:  1. MILAGRO, non oliguric, likely secondary to Pre renal azotemia,lisinopril held, Scr improved with IVF, also r/o urinary retention, as patient have hx of BPH  2. CKD stage 3b secondary to MPGN I, chronic interstitial Nephritis. Baseline scr 1.5-1.7 mg/dl  3. HTN well controlled. 4. BPH       Plan:  1. Can DC IVF  2. Continue to hold Lisinopril on discharge. 3. Bladder scan to r/o urinary retention  4. No objections on discharge. 5. BMP in 1 week,.  6. F/u with Dr Kimberli Doshi. D/w Medicine team and Nurse. Thank you for the consultation.       Electronically signed by Asha Iyer MD on 1/22/2021 at 12:41 PM

## 2021-01-22 NOTE — CARE COORDINATION
Order Date:  2021  [id number]                                          Patient Information      Name:  Kandi Castillo  :  1945  Age:  67 y.o. Address:  [address], [address], [place]   Zip:  [address]  PCP: Laury Calles Host Sex:  M  SSN: [id number]  Home Phone: [phone]  Work Phone:    Patient MRN:  [id number]    Alt Patient ID:  [id number]  PCP Phone: [phone]       Authorizing Provider Information       AUTHORIZING PROVIDER: Samina Rodríguez MD  Physician ID: [id number]  NPI:  [id number]  Site:   Address: [address] [place] [address] Zip: [address]  Phone: [phone]  Fax: [phone]              EQUIPMENT ORDERED  DME Order for Sendy Oliva (ORD   #:   [id number]) Priority  Routine Class  Hospital Performed        Associated Diagnosis:          Comments:    You must complete the order parameters below and add the medical necessity documentation for this DME in a separate note.     Folding Walker with Wheels     Current patient weight: Weight: 200 lb (90.7 kg)  Current patient height: Height: 5' 11\" (180.3 cm)  Diagnosis: Weakness and [place] Disturbance   Duration: Purchase            Scheduling Instructions:                                 Specimen Source             Collection Date    Collection Time    Order Status  Standing Expected Date                Electronically Signed By  Samina Rodríguez MD Date  2021           Responsible Party Vita Fermin Information     Guar-ActID   Relationship Account Type Home Phone   [facility] [id number] [address] Self P/F [phone]   Employer   Work Phone                  [employer]  Insurance/Subscriber ID:  Yobani Herndon Name:  Harrison Maloney              Relationship to Patient: SelfSigned ABN: N    Payor Name:  [employer] [facility] PPO   Group: [id number]  Worker's Comp Date of Injury:

## 2021-01-22 NOTE — PLAN OF CARE
Problem: Falls - Risk of:  Goal: Will remain free from falls  1/22/2021 0307 by Teddy Nascimento RN  Outcome: Ongoing  Note: Pt remains free from falls this shift. Bed in lowest position with wheels locked and 2/4 siderails up. Call light and bedside table within reach. Bed alarm activated. Pt moved closer to nurses' station. Will continue to monitor.    1/22/2021 0307 by Teddy Nascimento RN  Outcome: Ongoing  Goal: Absence of physical injury  1/22/2021 0307 by Teddy Nascimento RN  Outcome: Ongoing  1/22/2021 0307 by Teddy Nascimento RN  Outcome: Ongoing  1/22/2021 0305 by Teddy Nascimento RN  Outcome: Ongoing  1/21/2021 1931 by Apurva Alvarez RN  Outcome: Ongoing     Problem: ABCDS Injury Assessment  Goal: Absence of physical injury  1/22/2021 0307 by Teddy Nascimento RN  Outcome: Ongoing  1/22/2021 0307 by Teddy Nascimento RN  Outcome: Ongoing  1/22/2021 0305 by Teddy Nascimento RN  Outcome: Ongoing  1/21/2021 1931 by Apurva Alvarez RN  Outcome: Ongoing     Problem: Coping:  Goal: Ability to remain calm will improve  1/22/2021 0307 by Teddy Nascimento RN  Outcome: Ongoing  1/22/2021 0307 by Teddy Nascimento RN  Outcome: Ongoing     Problem: Safety:  Goal: Ability to remain free from injury will improve  1/22/2021 0307 by Teddy Nascimento RN  Outcome: Ongoing  1/22/2021 0307 by Teddy Nascimento RN  Outcome: Ongoing     Problem: Self-Care:  Goal: Ability to participate in self-care as condition permits will improve  1/22/2021 0307 by Teddy Nascimento RN  Outcome: Ongoing  1/22/2021 0307 by Teddy Nascimento RN  Outcome: Ongoing

## 2021-01-22 NOTE — PROGRESS NOTES
Physical Therapy    Facility/Department: [facility] MED SURG  Initial Assessment    NAME: Hermon Bernheim  : 1945  MRN: [id number]    Date of Service: 2021    Discharge Recommendations:  Patient would benefit from continued therapy after discharge, 24 hour supervision or assist   PT Equipment Recommendations  Equipment Needed: Yes  Mobility Devices: Jo Ku: Rolling    Assessment   Body structures, Functions, Activity limitations: Decreased balance;Decreased safe awareness;Decreased cognition  Assessment: Impaired mobility due to safety issues of decreased balance, cognition and safety awareness  Decision Making: Low Complexity  History: falls  Exam: decreased balance, cognition  Clinical Presentation: stable  REQUIRES PT FOLLOW UP: Yes  Activity Tolerance  Activity Tolerance: Patient Tolerated treatment well       Patient Diagnosis(es): The encounter diagnosis was MILAGRO (acute kidney injury) ([facility] [address] has a past medical history of Arthritis, Aspirin long-term use, Back pain, Chronic fatigue, Chronic kidney disease, CKD (chronic kidney disease), stage III, Essential hypertension, Fall at home, Family history of prostate problems, Heart disease, Hiatal hernia, HTN (hypertension), Hyperlipemia, Interstitial nephritis chronic, Isolated proteinuria, Mitral valve prolapse, MPGN (membranoproliferative glomerulonephritis), type 1, MVP (mitral valve prolapse), Neck pain, Nephrotic syndrome, NSAID long-term use, Primary osteoarthritis of hand, and Urination, excessive at night.   has a past surgical history that includes Neck surgery; Lumbar disc surgery; Ankle surgery; Inguinal hernia repair (08); Spine surgery; Colonoscopy; Tonsillectomy and adenoidectomy; and CT BIOPSY RENAL (2020).     Restrictions  Restrictions/Precautions  Restrictions/Precautions: Fall Risk        Subjective  General  Family / Caregiver Present: No  Follows Commands: Within Functional Limits(need to repeat instructions a times)  Subjective  Subjective: pt pleasant and cooperative  Pain Screening  Patient Currently in Pain: Denies  Vital Signs  Patient Currently in Pain: Denies       Orientation  Orientation  Overall Orientation Status: Impaired  Orientation Level: Oriented to place;Oriented to person  Social/Functional History  Social/Functional History  Lives With: Spouse  Type of Home: House  Home Layout: One level  Home Access: Stairs to enter with rails  Entrance Stairs - Number of Steps: 2  Entrance Stairs - Rails: (\"not sure\")  Bathroom Shower/Tub: Walk-in shower  Bathroom Toilet: Standard  Bathroom Equipment: Grab bars in shower  ADL Assistance: Independent  Ambulation Assistance: Independent  Transfer Assistance: Independent  Additional Comments: information may not be fully accurate due to pt's memory deficites       Objective          AROM RLE (degrees)  RLE AROM: WNL  AROM LLE (degrees)  LLE AROM : WNL  Strength RLE  Strength RLE: WNL  Comment: grossly 4/5  Strength LLE  Strength LLE: WNL  Comment: grossly 4/5        Bed mobility  Supine to Sit: Modified independent  Sit to Supine: Modified independent  Scooting: Modified independent  Comment: use of bed rail  Transfers  Sit to Stand: Stand by assistance  Stand to sit: Stand by assistance  Ambulation  Ambulation?: Yes  More Ambulation?: Yes  Ambulation 1  Surface: level tile  Device: No Device  Assistance: Contact guard assistance  Quality of Gait: marked forward trunk flexion  Gait Deviations: Slow Meena;Decreased step length(decreased ISABELLE)  Distance: 40ft  Ambulation 2  Surface - 2: level tile  Device 2: Rolling Walker  Assistance 2: Stand by assistance  Quality of Gait 2: less forward trunk flexion than without device  Gait Deviations: Decreased step length(decreased ISABELLE)  Distance: 50ft  Stairs/Curb  Stairs?: No     Balance  Posture: (significant trunk shift to the right and forward flexion in standing)  Sitting - Static: Good  Sitting - Dynamic: Good  Standing - Static: Good  Standing - Dynamic: Fair;+(CGA)        Plan   Plan  Times per week: 6-7x/wk  Current Treatment Recommendations: Balance Training, Gait Training, Stair training, Patient/Caregiver Education & Training  Safety Devices  Type of devices: Patient at risk for falls, Left in bed, Bed alarm in place, Call light within reach(pull-away alarm also on)        Goals  Short term goals  Time Frame for Short term goals: 2-3 days  Short term goal 1: Independent bed mobility  Short term goal 2:  Independent transfers  Short term goal 3: mod-I gait with RW x 150-200ft  Short term goal 4: negotiate 2-3 steps with rail/SBA       Therapy Time   Individual Concurrent Group Co-treatment   Time In 1240         Time Out 1308         Minutes [facility], [address]

## 2021-01-22 NOTE — CONSULTS
Paged Dr. Glenroy Hernandez via perfect serve.  For acute kidney injury at 10:24   [facility]

## 2021-01-22 NOTE — PROGRESS NOTES
Spoke to patient and his wife who was present in room. Patient is alert and oriented x 3. Mentation and behavior significantly improved compared to this AM.  PT/OT worked with patient and recommended a rolling walker, DME order placed. Wife feels comfortable with patient being discharged home, states she does not want him to go to a nursing home and can care for him. Social work / care coordination setting up VNS. Wife and patient agreeable to discharge today. Bladder scan postvoid residual 82 ml. Nephrology cleared for discharge with follow up with Dr. Paradise Rojas in 2 weeks, avoidance of lisinopril, and repeat BMP in 1 week. Instructions reviewed with wife who verbalized agreement/understanding.     Electronically signed by Iker Rosales MD on 1/22/21 at 4:41 PM EST

## 2021-01-22 NOTE — PROGRESS NOTES
Comprehensive Nutrition Assessment    Type and Reason for Visit:  Positive Nutrition Screen(weight loss, poor appetite poor intakes)    Nutrition Recommendations/Plan:   1. Continue Renal diet  2. Monitor PO intake, weight and labs. May need oral supplements    Nutrition Assessment:  Patient admitted with acute kidney injury superimposed on chronic kidney disease, Had altered mental status with confusion and gait problem at time of admission. Information obtained from the chart and patient was angry and refused to talk to this writer. Per weight history patient lost 4.3% weight in last 3 months that's not significant, BMI 27.89. Noted abnormal renal labs, nephrology consult in place. Continue renal diet and monitor PO intakes, weight and labs. Malnutrition Assessment:  Malnutrition Status: At risk for malnutrition (Comment)    Context:  Acute Illness     Findings of the 6 clinical characteristics of malnutrition:  Energy Intake:  Unable to assess  Weight Loss:  1 - 7.5% over 3 months     Body Fat Loss:  Unable to assess     Muscle Mass Loss:  Unable to assess    Fluid Accumulation:  No significant fluid accumulation     Strength:  Not Performed    Estimated Daily Nutrient Needs:  Energy (kcal):  1991 kcal based on [place]- [place] Jeor and 1.2 factor; Weight Used for Energy Requirements:  Current     Protein (g):  78-94 gm based on 1-1.2 gm/kg of ideal (CKD-3); Weight Used for Protein Requirements:  Ideal          Nutrition Related Findings:  No edema. Bowel sounds active      Wounds:  None       Current Nutrition Therapies:    DIET RENAL; Anthropometric Measures:  · Height: 5' 11\" (180.3 cm)  · Current Body Weight: 200 lb (90.7 kg)   · Admission Body Weight: 200 lb (90.7 kg)    · Usual Body Weight: 210 lb (95.3 kg)     · Ideal Body Weight: 172 lbs; % Ideal Body Weight 116.3 %   · BMI: 27.9  · Adjusted Body Weight:  ; No Adjustment   · BMI Categories: Overweight (BMI 25.0-29. 9)       Nutrition Diagnosis: · Predicted inadequate energy intake related to renal dysfunction as evidenced by lab values    Nutrition Interventions:   Food and/or Nutrient Delivery:  Continue Current Diet  Nutrition Education/Counseling:  Education not indicated   Coordination of Nutrition Care:  Continue to monitor while inpatient    Goals:  Meet greater than 75% of estimated nutrition needs       Nutrition Monitoring and Evaluation:   Food/Nutrient Intake Outcomes:  Food and Nutrient Intake  Physical Signs/Symptoms Outcomes:  Biochemical Data, Weight     Discharge Planning:     Too soon to determine     Nutrition Problem #1: Predicted inadequate energy intake  Intervention: Food and/or Nutrient Delivery: Continue Current Diet  Nutritional Goals: Meet greater than 75% of estimated nutrition needs

## 2021-01-22 NOTE — CARE COORDINATION
CASE MANAGEMENT NOTE:    Admission Date:  1/21/2021 Tameka Faust is a 67 y.o.  male    Admitted for : Acute kidney injury superimposed on chronic kidney disease ([facility] [address] [N17.9, N18.9]    Met with:  Patient and Family- Mariola Whitejúnior Johnson     PCP:  Cheryl 37:  [employer]      Current Residence/ Living Arrangements:  independently at home           Current Services PTA:  No    Is patient agreeable to VNS: Yes    Freedom of choice provided:  Yes    List of [address] provided: Discussed and wants one covered by insurance    VNS chosen:  Referral made to [address] [place] [address]    DME:  none    Home Oxygen: No    Nebulizer: No    CPAP/BIPAP: No    Supplier: N/A    Potential Assistance Needed: Yes    SNF needed: Declined    Freedom of choice and list provided: NA    Pharmacy[facility]  [facility]       Does Patient want to use MEDS to BEDS? No    Is patient currently receiving oral anticoagulation therapy? No    Is the Patient an EDY HOLGU[facility] with Readmission Risk Score greater than 14%? No  If yes, pt needs a follow up appointment made within 7 days. Family Members/Caregivers that pt would like involved in their care:    Yes    If yes, list name here:  Sarika Foot and [address]    Transportation Provider:  Family             Discharge Plan:  1/22/2021  [employer]; From single story home with spouse- she stated he is independent at home for the most part; DME- therapy recommending a rolling walker- will obtain for patient; Agreeable to VNS- Referral made to [address] [place] [address]; SNF declined [facility] NEEDS SIGNED/COMPLETED//ELISE                Electronically signed by: Keith Helms RN on 1/22/2021 at 3:14 PM       Update 0- Writer faxed facesheet and DME order to [facility] for a rolling walker. Communication on fax that it is for home delivery. Will place on AVS for patient/spouse to call [facility] when they get home to arrange delivery.   Writer also communicated this with liaison Franklyn Rodriguez from [facility] at this time.     Electronically signed by Nohelia Cedillo RN on 1/22/2021 at 3:39 PM

## 2021-01-22 NOTE — PLAN OF CARE
Problem: Falls - Risk of:  Goal: Will remain free from falls  Description: Will remain free from falls  Outcome: Ongoing  Goal: Absence of physical injury  Description: Absence of physical injury  Outcome: Ongoing     Problem: ABCDS Injury Assessment  Goal: Absence of physical injury  Outcome: Ongoing

## 2021-01-22 NOTE — CARE COORDINATION
ONGOING DISCHARGE PLAN:    Patient discharged. Writer still awaiting response from Randa Garcia with [facility] [address] to see if they will accept this patient. Writer did faxed them facesheet, discharge medication list and signed UMER. Randa Garcia made aware and asked to call back case management this weekend if unable to accept. Will continue to follow for additional discharge needs. Electronically signed by Kim Schafer RN on 1/22/2021 at 5:27 PM     Update [address] [place] [address] can accept this patient.     Electronically signed by Kim Schafer RN on 1/22/2021 at 5:36 PM

## 2021-02-10 ENCOUNTER — HOSPITAL ENCOUNTER (OUTPATIENT)
Age: 76
Setting detail: SPECIMEN
Discharge: HOME | End: 2021-02-10
Payer: MEDICARE

## 2021-02-10 LAB — VITAMIN B-12: 608 PG/ML (ref 232–1245)

## 2021-02-10 PROCEDURE — 82607 VITAMIN B-12: CPT

## 2021-03-10 ENCOUNTER — HOSPITAL ENCOUNTER (OUTPATIENT)
Age: 76
Discharge: HOME | End: 2021-03-10
Payer: MEDICARE

## 2021-03-10 DIAGNOSIS — N18.30 CHRONIC KIDNEY DISEASE, STAGE III (MODERATE) (HCC): ICD-10-CM

## 2021-03-10 DIAGNOSIS — R80.1 PERSISTENT PROTEINURIA: ICD-10-CM

## 2021-03-10 LAB
ALBUMIN SERPL-MCNC: 3.9 G/DL (ref 3.5–5.2)
ANION GAP SERPL CALCULATED.3IONS-SCNC: 9 MMOL/L (ref 9–17)
BUN BLDV-MCNC: 31 MG/DL (ref 8–23)
BUN/CREAT BLD: ABNORMAL (ref 9–20)
CALCIUM SERPL-MCNC: 9.4 MG/DL (ref 8.6–10.4)
CHLORIDE BLD-SCNC: 103 MMOL/L (ref 98–107)
CO2: 26 MMOL/L (ref 20–31)
CREAT SERPL-MCNC: 1.67 MG/DL (ref 0.7–1.2)
CREATININE URINE: 105.5 MG/DL (ref 39–259)
GFR AFRICAN AMERICAN: 49 ML/MIN
GFR NON-AFRICAN AMERICAN: 40 ML/MIN
GFR SERPL CREATININE-BSD FRML MDRD: ABNORMAL ML/MIN/{1.73_M2}
GFR SERPL CREATININE-BSD FRML MDRD: ABNORMAL ML/MIN/{1.73_M2}
GLUCOSE BLD-MCNC: 68 MG/DL (ref 70–99)
HCT VFR BLD CALC: 35.6 % (ref 41–53)
HEMOGLOBIN: 11.7 G/DL (ref 13.5–17.5)
MCH RBC QN AUTO: 31.3 PG (ref 26–34)
MCHC RBC AUTO-ENTMCNC: 32.9 G/DL (ref 31–37)
MCV RBC AUTO: 95 FL (ref 80–100)
NRBC AUTOMATED: ABNORMAL PER 100 WBC
PDW BLD-RTO: 15.3 % (ref 11.5–14.9)
PHOSPHORUS: 4.1 MG/DL (ref 2.5–4.5)
PLATELET # BLD: 230 K/UL (ref 150–450)
PMV BLD AUTO: 9 FL (ref 6–12)
POTASSIUM SERPL-SCNC: 5 MMOL/L (ref 3.7–5.3)
PTH INTACT: 51.88 PG/ML (ref 15–65)
RBC # BLD: 3.75 M/UL (ref 4.5–5.9)
SODIUM BLD-SCNC: 138 MMOL/L (ref 135–144)
TOTAL PROTEIN, URINE: 195 MG/DL
URINE TOTAL PROTEIN CREATININE RATIO: 1.85 (ref 0–0.2)
WBC # BLD: 12 K/UL (ref 3.5–11)

## 2021-03-10 PROCEDURE — 82040 ASSAY OF SERUM ALBUMIN: CPT

## 2021-03-10 PROCEDURE — 80048 BASIC METABOLIC PNL TOTAL CA: CPT

## 2021-03-10 PROCEDURE — 83970 ASSAY OF PARATHORMONE: CPT

## 2021-03-10 PROCEDURE — 36415 COLL VENOUS BLD VENIPUNCTURE: CPT

## 2021-03-10 PROCEDURE — 84156 ASSAY OF PROTEIN URINE: CPT

## 2021-03-10 PROCEDURE — 84100 ASSAY OF PHOSPHORUS: CPT

## 2021-03-10 PROCEDURE — 85027 COMPLETE CBC AUTOMATED: CPT

## 2021-03-10 PROCEDURE — 82570 ASSAY OF URINE CREATININE: CPT

## 2021-06-15 ENCOUNTER — HOSPITAL ENCOUNTER (OUTPATIENT)
Age: 76
Discharge: HOME | End: 2021-06-15
Payer: MEDICARE

## 2021-06-15 DIAGNOSIS — N05.5 MPGN (MEMBRANOPROLIFERATIVE GLOMERULONEPHRITIS), TYPE 1: ICD-10-CM

## 2021-06-15 DIAGNOSIS — R80.1 PERSISTENT PROTEINURIA: ICD-10-CM

## 2021-06-15 DIAGNOSIS — N18.32 STAGE 3B CHRONIC KIDNEY DISEASE (HCC): ICD-10-CM

## 2021-06-15 LAB
ALBUMIN SERPL-MCNC: 3.8 G/DL (ref 3.5–5.2)
ALT SERPL-CCNC: 10 U/L (ref 5–41)
ANION GAP SERPL CALCULATED.3IONS-SCNC: 11 MMOL/L (ref 9–17)
AST SERPL-CCNC: 14 U/L
BUN BLDV-MCNC: 29 MG/DL (ref 8–23)
BUN/CREAT BLD: ABNORMAL (ref 9–20)
CALCIUM SERPL-MCNC: 9.2 MG/DL (ref 8.6–10.4)
CHLORIDE BLD-SCNC: 106 MMOL/L (ref 98–107)
CO2: 21 MMOL/L (ref 20–31)
CREAT SERPL-MCNC: 1.74 MG/DL (ref 0.7–1.2)
CREATININE URINE: 63 MG/DL (ref 39–259)
GFR AFRICAN AMERICAN: 46 ML/MIN
GFR NON-AFRICAN AMERICAN: 38 ML/MIN
GFR SERPL CREATININE-BSD FRML MDRD: ABNORMAL ML/MIN/{1.73_M2}
GFR SERPL CREATININE-BSD FRML MDRD: ABNORMAL ML/MIN/{1.73_M2}
GLUCOSE BLD-MCNC: 105 MG/DL (ref 70–99)
HCT VFR BLD CALC: 39 % (ref 41–53)
HEMOGLOBIN: 12.7 G/DL (ref 13.5–17.5)
MCH RBC QN AUTO: 29.9 PG (ref 26–34)
MCHC RBC AUTO-ENTMCNC: 32.5 G/DL (ref 31–37)
MCV RBC AUTO: 92.2 FL (ref 80–100)
NRBC AUTOMATED: ABNORMAL PER 100 WBC
PDW BLD-RTO: 15.4 % (ref 11.5–14.9)
PHOSPHORUS: 4.1 MG/DL (ref 2.5–4.5)
PLATELET # BLD: 189 K/UL (ref 150–450)
PMV BLD AUTO: 8.5 FL (ref 6–12)
POTASSIUM SERPL-SCNC: 4.6 MMOL/L (ref 3.7–5.3)
PTH INTACT: 55.47 PG/ML (ref 15–65)
RBC # BLD: 4.23 M/UL (ref 4.5–5.9)
SODIUM BLD-SCNC: 138 MMOL/L (ref 135–144)
TOTAL PROTEIN, URINE: 148 MG/DL
URIC ACID: 4.5 MG/DL (ref 3.4–7)
URINE TOTAL PROTEIN CREATININE RATIO: 2.35 (ref 0–0.2)
WBC # BLD: 9.5 K/UL (ref 3.5–11)

## 2021-06-15 PROCEDURE — 84550 ASSAY OF BLOOD/URIC ACID: CPT

## 2021-06-15 PROCEDURE — 83970 ASSAY OF PARATHORMONE: CPT

## 2021-06-15 PROCEDURE — 36415 COLL VENOUS BLD VENIPUNCTURE: CPT

## 2021-06-15 PROCEDURE — 84100 ASSAY OF PHOSPHORUS: CPT

## 2021-06-15 PROCEDURE — 84156 ASSAY OF PROTEIN URINE: CPT

## 2021-06-15 PROCEDURE — 84450 TRANSFERASE (AST) (SGOT): CPT

## 2021-06-15 PROCEDURE — 84460 ALANINE AMINO (ALT) (SGPT): CPT

## 2021-06-15 PROCEDURE — 85027 COMPLETE CBC AUTOMATED: CPT

## 2021-06-15 PROCEDURE — 82570 ASSAY OF URINE CREATININE: CPT

## 2021-06-15 PROCEDURE — 80048 BASIC METABOLIC PNL TOTAL CA: CPT

## 2021-06-15 PROCEDURE — 82040 ASSAY OF SERUM ALBUMIN: CPT

## 2021-09-02 ENCOUNTER — HOSPITAL ENCOUNTER (OUTPATIENT)
Age: 76
Discharge: HOME | End: 2021-09-02
Payer: MEDICARE

## 2021-09-02 DIAGNOSIS — N18.32 STAGE 3B CHRONIC KIDNEY DISEASE (HCC): ICD-10-CM

## 2021-09-02 DIAGNOSIS — N05.5 MPGN (MEMBRANOPROLIFERATIVE GLOMERULONEPHRITIS), TYPE 1: ICD-10-CM

## 2021-09-02 DIAGNOSIS — R80.1 PERSISTENT PROTEINURIA: ICD-10-CM

## 2021-09-02 LAB
ALBUMIN SERPL-MCNC: 3.9 G/DL (ref 3.5–5.2)
ANION GAP SERPL CALCULATED.3IONS-SCNC: 7 MMOL/L (ref 9–17)
BUN BLDV-MCNC: 29 MG/DL (ref 8–23)
BUN/CREAT BLD: ABNORMAL (ref 9–20)
CALCIUM SERPL-MCNC: 9.4 MG/DL (ref 8.6–10.4)
CHLORIDE BLD-SCNC: 103 MMOL/L (ref 98–107)
CO2: 25 MMOL/L (ref 20–31)
CREAT SERPL-MCNC: 1.84 MG/DL (ref 0.7–1.2)
CREATININE URINE: 141.3 MG/DL (ref 39–259)
GFR AFRICAN AMERICAN: 43 ML/MIN
GFR NON-AFRICAN AMERICAN: 36 ML/MIN
GFR SERPL CREATININE-BSD FRML MDRD: ABNORMAL ML/MIN/{1.73_M2}
GFR SERPL CREATININE-BSD FRML MDRD: ABNORMAL ML/MIN/{1.73_M2}
GLUCOSE BLD-MCNC: 99 MG/DL (ref 70–99)
HCT VFR BLD CALC: 38.4 % (ref 41–53)
HEMOGLOBIN: 12.7 G/DL (ref 13.5–17.5)
MCH RBC QN AUTO: 30.9 PG (ref 26–34)
MCHC RBC AUTO-ENTMCNC: 33 G/DL (ref 31–37)
MCV RBC AUTO: 93.6 FL (ref 80–100)
NRBC AUTOMATED: ABNORMAL PER 100 WBC
PDW BLD-RTO: 14.9 % (ref 11.5–14.9)
PHOSPHORUS: 4.3 MG/DL (ref 2.5–4.5)
PLATELET # BLD: 192 K/UL (ref 150–450)
PMV BLD AUTO: 8.7 FL (ref 6–12)
POTASSIUM SERPL-SCNC: 4.4 MMOL/L (ref 3.7–5.3)
PTH INTACT: 45.56 PG/ML (ref 15–65)
RBC # BLD: 4.1 M/UL (ref 4.5–5.9)
SODIUM BLD-SCNC: 135 MMOL/L (ref 135–144)
TOTAL PROTEIN, URINE: 224 MG/DL
URINE TOTAL PROTEIN CREATININE RATIO: 1.59 (ref 0–0.2)
WBC # BLD: 9.7 K/UL (ref 3.5–11)

## 2021-09-02 PROCEDURE — 82040 ASSAY OF SERUM ALBUMIN: CPT

## 2021-09-02 PROCEDURE — 80048 BASIC METABOLIC PNL TOTAL CA: CPT

## 2021-09-02 PROCEDURE — 84156 ASSAY OF PROTEIN URINE: CPT

## 2021-09-02 PROCEDURE — 83970 ASSAY OF PARATHORMONE: CPT

## 2021-09-02 PROCEDURE — 82570 ASSAY OF URINE CREATININE: CPT

## 2021-09-02 PROCEDURE — 36415 COLL VENOUS BLD VENIPUNCTURE: CPT

## 2021-09-02 PROCEDURE — 84100 ASSAY OF PHOSPHORUS: CPT

## 2021-09-02 PROCEDURE — 85027 COMPLETE CBC AUTOMATED: CPT

## 2022-02-21 ENCOUNTER — HOSPITAL ENCOUNTER (OUTPATIENT)
Age: 77
Discharge: HOME | End: 2022-02-21
Payer: MEDICARE

## 2022-02-21 DIAGNOSIS — R80.1 PERSISTENT PROTEINURIA: ICD-10-CM

## 2022-02-21 DIAGNOSIS — N05.5 MPGN (MEMBRANOPROLIFERATIVE GLOMERULONEPHRITIS), TYPE 1: ICD-10-CM

## 2022-02-21 DIAGNOSIS — N18.32 STAGE 3B CHRONIC KIDNEY DISEASE (HCC): ICD-10-CM

## 2022-02-21 LAB
ALBUMIN SERPL-MCNC: 3.9 G/DL (ref 3.5–5.2)
ANION GAP SERPL CALCULATED.3IONS-SCNC: 11 MMOL/L (ref 9–17)
BUN BLDV-MCNC: 28 MG/DL (ref 8–23)
BUN/CREAT BLD: ABNORMAL (ref 9–20)
CALCIUM SERPL-MCNC: 9.3 MG/DL (ref 8.6–10.4)
CHLORIDE BLD-SCNC: 105 MMOL/L (ref 98–107)
CO2: 23 MMOL/L (ref 20–31)
CREAT SERPL-MCNC: 1.98 MG/DL (ref 0.7–1.2)
CREATININE URINE: 128.5 MG/DL (ref 39–259)
GFR AFRICAN AMERICAN: 40 ML/MIN
GFR NON-AFRICAN AMERICAN: 33 ML/MIN
GFR SERPL CREATININE-BSD FRML MDRD: ABNORMAL ML/MIN/{1.73_M2}
GFR SERPL CREATININE-BSD FRML MDRD: ABNORMAL ML/MIN/{1.73_M2}
GLUCOSE BLD-MCNC: 105 MG/DL (ref 70–99)
HCT VFR BLD CALC: 38.4 % (ref 41–53)
HEMOGLOBIN: 12.7 G/DL (ref 13.5–17.5)
MCH RBC QN AUTO: 31 PG (ref 26–34)
MCHC RBC AUTO-ENTMCNC: 33.1 G/DL (ref 31–37)
MCV RBC AUTO: 93.7 FL (ref 80–100)
NRBC AUTOMATED: ABNORMAL PER 100 WBC
PDW BLD-RTO: 15.2 % (ref 11.5–14.9)
PHOSPHORUS: 3.6 MG/DL (ref 2.5–4.5)
PLATELET # BLD: 221 K/UL (ref 150–450)
PMV BLD AUTO: 9.1 FL (ref 6–12)
POTASSIUM SERPL-SCNC: 4.5 MMOL/L (ref 3.7–5.3)
PTH INTACT: 62.97 PG/ML (ref 15–65)
RBC # BLD: 4.1 M/UL (ref 4.5–5.9)
SODIUM BLD-SCNC: 139 MMOL/L (ref 135–144)
TOTAL PROTEIN, URINE: 375 MG/DL
URINE TOTAL PROTEIN CREATININE RATIO: 2.92 (ref 0–0.2)
WBC # BLD: 11.5 K/UL (ref 3.5–11)

## 2022-02-21 PROCEDURE — 82570 ASSAY OF URINE CREATININE: CPT

## 2022-02-21 PROCEDURE — 36415 COLL VENOUS BLD VENIPUNCTURE: CPT

## 2022-02-21 PROCEDURE — 84100 ASSAY OF PHOSPHORUS: CPT

## 2022-02-21 PROCEDURE — 80048 BASIC METABOLIC PNL TOTAL CA: CPT

## 2022-02-21 PROCEDURE — 85027 COMPLETE CBC AUTOMATED: CPT

## 2022-02-21 PROCEDURE — 83970 ASSAY OF PARATHORMONE: CPT

## 2022-02-21 PROCEDURE — 82040 ASSAY OF SERUM ALBUMIN: CPT

## 2022-02-21 PROCEDURE — 84156 ASSAY OF PROTEIN URINE: CPT

## 2022-08-17 ENCOUNTER — HOSPITAL ENCOUNTER (OUTPATIENT)
Age: 77
Discharge: HOME | End: 2022-08-17
Payer: MEDICARE

## 2022-08-17 DIAGNOSIS — N18.32 STAGE 3B CHRONIC KIDNEY DISEASE (HCC): ICD-10-CM

## 2022-08-17 DIAGNOSIS — N05.5 MPGN (MEMBRANOPROLIFERATIVE GLOMERULONEPHRITIS), TYPE 1: ICD-10-CM

## 2022-08-17 DIAGNOSIS — R80.1 PERSISTENT PROTEINURIA: ICD-10-CM

## 2022-08-17 LAB
ALBUMIN SERPL-MCNC: 3.9 G/DL (ref 3.5–5.2)
ANION GAP SERPL CALCULATED.3IONS-SCNC: 8 MMOL/L (ref 9–17)
BUN BLDV-MCNC: 36 MG/DL (ref 8–23)
CALCIUM SERPL-MCNC: 9.4 MG/DL (ref 8.6–10.4)
CHLORIDE BLD-SCNC: 106 MMOL/L (ref 98–107)
CO2: 24 MMOL/L (ref 20–31)
CREAT SERPL-MCNC: 2.31 MG/DL (ref 0.7–1.2)
CREATININE URINE: 78.2 MG/DL (ref 39–259)
GFR AFRICAN AMERICAN: 33 ML/MIN
GFR NON-AFRICAN AMERICAN: 28 ML/MIN
GFR SERPL CREATININE-BSD FRML MDRD: ABNORMAL ML/MIN/{1.73_M2}
GLUCOSE BLD-MCNC: 124 MG/DL (ref 70–99)
HCT VFR BLD CALC: 39.8 % (ref 41–53)
HEMOGLOBIN: 13.3 G/DL (ref 13.5–17.5)
MCH RBC QN AUTO: 31.7 PG (ref 26–34)
MCHC RBC AUTO-ENTMCNC: 33.4 G/DL (ref 31–37)
MCV RBC AUTO: 94.9 FL (ref 80–100)
PDW BLD-RTO: 14.6 % (ref 11.5–14.9)
PHOSPHORUS: 3.9 MG/DL (ref 2.5–4.5)
PLATELET # BLD: 221 K/UL (ref 150–450)
PMV BLD AUTO: 8.7 FL (ref 6–12)
POTASSIUM SERPL-SCNC: 4.8 MMOL/L (ref 3.7–5.3)
PTH INTACT: 74.75 PG/ML (ref 15–65)
RBC # BLD: 4.19 M/UL (ref 4.5–5.9)
SODIUM BLD-SCNC: 138 MMOL/L (ref 135–144)
TOTAL PROTEIN, URINE: 86 MG/DL
URINE TOTAL PROTEIN CREATININE RATIO: 1.1 (ref 0–0.2)
WBC # BLD: 10.5 K/UL (ref 3.5–11)

## 2022-08-17 PROCEDURE — 83970 ASSAY OF PARATHORMONE: CPT

## 2022-08-17 PROCEDURE — 84100 ASSAY OF PHOSPHORUS: CPT

## 2022-08-17 PROCEDURE — 82040 ASSAY OF SERUM ALBUMIN: CPT

## 2022-08-17 PROCEDURE — 84156 ASSAY OF PROTEIN URINE: CPT

## 2022-08-17 PROCEDURE — 80048 BASIC METABOLIC PNL TOTAL CA: CPT

## 2022-08-17 PROCEDURE — 36415 COLL VENOUS BLD VENIPUNCTURE: CPT

## 2022-08-17 PROCEDURE — 82570 ASSAY OF URINE CREATININE: CPT

## 2022-08-17 PROCEDURE — 85027 COMPLETE CBC AUTOMATED: CPT

## 2022-08-18 PROBLEM — N18.4 KIDNEY DISEASE, CHRONIC, STAGE IV (SEVERE, EGFR 15-29 ML/MIN) (HCC): Status: ACTIVE | Noted: 2017-08-09

## 2022-09-07 PROBLEM — F01.50 VASCULAR DEMENTIA WITHOUT BEHAVIORAL DISTURBANCE (HCC): Status: ACTIVE | Noted: 2022-09-07

## 2022-09-30 ENCOUNTER — OFFICE VISIT (OUTPATIENT)
Dept: FAMILY MEDICINE CLINIC | Age: 77
End: 2022-09-30
Payer: MEDICARE

## 2022-09-30 VITALS
SYSTOLIC BLOOD PRESSURE: 122 MMHG | TEMPERATURE: 97.1 F | DIASTOLIC BLOOD PRESSURE: 80 MMHG | HEIGHT: 70 IN | BODY MASS INDEX: 29.52 KG/M2 | RESPIRATION RATE: 16 BRPM | OXYGEN SATURATION: 100 % | WEIGHT: 206.2 LBS | HEART RATE: 57 BPM

## 2022-09-30 DIAGNOSIS — Z23 NEEDS FLU SHOT: ICD-10-CM

## 2022-09-30 DIAGNOSIS — F03.91 DEMENTIA WITH BEHAVIORAL DISTURBANCE, UNSPECIFIED DEMENTIA TYPE: Primary | ICD-10-CM

## 2022-09-30 DIAGNOSIS — M10.9 GOUT, UNSPECIFIED CAUSE, UNSPECIFIED CHRONICITY, UNSPECIFIED SITE: ICD-10-CM

## 2022-09-30 DIAGNOSIS — E78.5 DYSLIPIDEMIA: ICD-10-CM

## 2022-09-30 DIAGNOSIS — N18.4 KIDNEY DISEASE, CHRONIC, STAGE IV (SEVERE, EGFR 15-29 ML/MIN) (HCC): ICD-10-CM

## 2022-09-30 DIAGNOSIS — F03.91 DEMENTIA WITH BEHAVIORAL DISTURBANCE, UNSPECIFIED DEMENTIA TYPE: ICD-10-CM

## 2022-09-30 DIAGNOSIS — I10 ESSENTIAL HYPERTENSION: Primary | ICD-10-CM

## 2022-09-30 PROCEDURE — 99204 OFFICE O/P NEW MOD 45 MIN: CPT | Performed by: FAMILY MEDICINE

## 2022-09-30 PROCEDURE — 1123F ACP DISCUSS/DSCN MKR DOCD: CPT | Performed by: FAMILY MEDICINE

## 2022-09-30 PROCEDURE — 90694 VACC AIIV4 NO PRSRV 0.5ML IM: CPT | Performed by: FAMILY MEDICINE

## 2022-09-30 PROCEDURE — G0008 ADMIN INFLUENZA VIRUS VAC: HCPCS | Performed by: FAMILY MEDICINE

## 2022-09-30 SDOH — ECONOMIC STABILITY: FOOD INSECURITY: WITHIN THE PAST 12 MONTHS, THE FOOD YOU BOUGHT JUST DIDN'T LAST AND YOU DIDN'T HAVE MONEY TO GET MORE.: NEVER TRUE

## 2022-09-30 SDOH — ECONOMIC STABILITY: FOOD INSECURITY: WITHIN THE PAST 12 MONTHS, YOU WORRIED THAT YOUR FOOD WOULD RUN OUT BEFORE YOU GOT MONEY TO BUY MORE.: NEVER TRUE

## 2022-09-30 ASSESSMENT — PATIENT HEALTH QUESTIONNAIRE - PHQ9
10. IF YOU CHECKED OFF ANY PROBLEMS, HOW DIFFICULT HAVE THESE PROBLEMS MADE IT FOR YOU TO DO YOUR WORK, TAKE CARE OF THINGS AT HOME, OR GET ALONG WITH OTHER PEOPLE: 0
1. LITTLE INTEREST OR PLEASURE IN DOING THINGS: 0
SUM OF ALL RESPONSES TO PHQ QUESTIONS 1-9: 0
8. MOVING OR SPEAKING SO SLOWLY THAT OTHER PEOPLE COULD HAVE NOTICED. OR THE OPPOSITE, BEING SO FIGETY OR RESTLESS THAT YOU HAVE BEEN MOVING AROUND A LOT MORE THAN USUAL: 0
SUM OF ALL RESPONSES TO PHQ QUESTIONS 1-9: 0
SUM OF ALL RESPONSES TO PHQ9 QUESTIONS 1 & 2: 0
4. FEELING TIRED OR HAVING LITTLE ENERGY: 0
3. TROUBLE FALLING OR STAYING ASLEEP: 0
SUM OF ALL RESPONSES TO PHQ QUESTIONS 1-9: 0
6. FEELING BAD ABOUT YOURSELF - OR THAT YOU ARE A FAILURE OR HAVE LET YOURSELF OR YOUR FAMILY DOWN: 0
9. THOUGHTS THAT YOU WOULD BE BETTER OFF DEAD, OR OF HURTING YOURSELF: 0
5. POOR APPETITE OR OVEREATING: 0
2. FEELING DOWN, DEPRESSED OR HOPELESS: 0
SUM OF ALL RESPONSES TO PHQ QUESTIONS 1-9: 0
7. TROUBLE CONCENTRATING ON THINGS, SUCH AS READING THE NEWSPAPER OR WATCHING TELEVISION: 0

## 2022-09-30 ASSESSMENT — ENCOUNTER SYMPTOMS
ABDOMINAL PAIN: 0
BLOOD IN STOOL: 0
CHEST TIGHTNESS: 0
SHORTNESS OF BREATH: 0

## 2022-09-30 ASSESSMENT — SOCIAL DETERMINANTS OF HEALTH (SDOH): HOW HARD IS IT FOR YOU TO PAY FOR THE VERY BASICS LIKE FOOD, HOUSING, MEDICAL CARE, AND HEATING?: NOT HARD AT ALL

## 2022-09-30 NOTE — PROGRESS NOTES
Subjective:      Patient ID: Tirso Marie is a 65 y.o. male. HPI  Visit Information    Have you changed or started any medications since your last visit including any over-the-counter medicines, vitamins, or herbal medicines? no   Are you having any side effects from any of your medications? -  no  Have you stopped taking any of your medications? Is so, why? -  no    Have you seen any other physician or provider since your last visit? Yes - Records Obtained DR James Chakraborty  Have you had any other diagnostic tests since your last visit? Yes - Records Obtained  Have you been seen in the emergency room and/or had an admission to a hospital since we last saw you? No  Have you had your routine dental cleaning in the past 6 months? no    Have you activated your [employer] account? If not, what are your barriers? No:      Patient Care Team:  Filemon Ramsay MD as PCP - General (Family Medicine)  Pooja Bocanegra MD as Consulting Physician (Nephrology)    Medical History Review  Past Medical, Family, and Social History reviewed and does contribute to the patient presenting condition    Health Maintenance   Topic Date Due    Pneumococcal 65+ years Vaccine (1 - PCV) Never done    Depression Screen  Never done    DTaP/Tdap/Td vaccine (1 - Tdap) Never done    Shingles vaccine (1 of 2) Never done    Annual Wellness Visit (AWV)  Never done    COVID-19 Vaccine (3 - Booster for Baxter Peter series) 09/05/2021    Flu vaccine (1) 08/01/2022    Hepatitis C screen  Completed    Hepatitis A vaccine  Aged Out    Hepatitis B vaccine  Aged Out    Hib vaccine  Aged Out    Meningococcal (ACWY) vaccine  Aged Out     Patient is a 25-year-old white male who presents for his initial office visit here for hypertension, dyslipidemia, gout. He also has a history of dementia and CKD. Patient is accompanied by his wife who states that sometimes patient becomes combative. Patient is taking and tolerating his routine medication.   He denies any fever, chills, chest pain, abdominal pain, shortness of breath. He has a good appetite and remains active. Review of Systems   Constitutional:  Negative for chills and fever. HENT:  Negative for congestion. Respiratory:  Negative for chest tightness and shortness of breath. Cardiovascular:  Negative for chest pain. Gastrointestinal:  Negative for abdominal pain and blood in stool. Genitourinary:  Negative for dysuria and hematuria. Skin:  Negative for rash. Neurological:  Negative for dizziness. Psychiatric/Behavioral:  Positive for confusion. Objective:   Physical Exam  Vitals and nursing note reviewed. Constitutional:       General: He is not in acute distress. Appearance: He is well-developed. HENT:      Head: Normocephalic and atraumatic. Right Ear: Tympanic membrane, ear canal and external ear normal.      Left Ear: Tympanic membrane, ear canal and external ear normal.      Nose: Nose normal.      Mouth/Throat:      Mouth: Mucous membranes are moist.      Pharynx: Oropharynx is clear. Eyes:      General: No scleral icterus. Right eye: No discharge. Left eye: No discharge. Conjunctiva/sclera: Conjunctivae normal.   Cardiovascular:      Rate and Rhythm: Normal rate and regular rhythm. Heart sounds: Normal heart sounds. Pulmonary:      Effort: Pulmonary effort is normal. No respiratory distress. Breath sounds: Normal breath sounds. No wheezing. Abdominal:      General: There is no distension. Palpations: Abdomen is soft. Tenderness: There is no abdominal tenderness. Musculoskeletal:      Cervical back: Neck supple. Skin:     General: Skin is warm and dry. Findings: No rash. Neurological:      Mental Status: He is alert. He is confused. Psychiatric:         Mood and Affect: Mood normal.         Behavior: Behavior normal.       Assessment:      1. Essential hypertension    - Lipid Panel;  Future  Continue current management  2. Dyslipidemia    - Lipid Panel; Future  Continue current management  3. Gout, unspecified cause, unspecified chronicity, unspecified site    - Uric Acid; Future  Continue current management  4. Dementia with behavioral disturbance, unspecified dementia type  Patient referred to neurology    5. Kidney disease, chronic, stage IV (severe, EGFR 15-29 ml/min) ([facility])  Continue management by patient's nephrologist          Plan:      Orders Placed This Encounter   Procedures    Lipid Panel     Standing Status:   Future     Standing Expiration Date:   9/30/2023     Order Specific Question:   Is Patient Fasting?/# of Hours     Answer:    Fast 8-10 hours    Uric Acid     Standing Status:   Future     Standing Expiration Date:   9/30/2023          Continue routine medications  Patient referred to neurology for his dementia  Follow-up in 6 months or sooner if needed

## 2022-10-03 ENCOUNTER — HOSPITAL ENCOUNTER (INPATIENT)
Age: 77
LOS: 17 days | Discharge: NURSING, REHAB OR LONG-TERM CARE | DRG: 884 | End: 2022-10-20
Attending: EMERGENCY MEDICINE | Admitting: FAMILY MEDICINE
Payer: MEDICARE

## 2022-10-03 ENCOUNTER — TELEPHONE (OUTPATIENT)
Dept: FAMILY MEDICINE CLINIC | Age: 77
End: 2022-10-03

## 2022-10-03 ENCOUNTER — APPOINTMENT (OUTPATIENT)
Dept: CT IMAGING | Age: 77
DRG: 884 | End: 2022-10-03
Payer: MEDICARE

## 2022-10-03 DIAGNOSIS — R41.82 ALTERED MENTAL STATUS, UNSPECIFIED ALTERED MENTAL STATUS TYPE: Primary | ICD-10-CM

## 2022-10-03 LAB
ABSOLUTE EOS #: 0.8 K/UL (ref 0–0.4)
ABSOLUTE LYMPH #: 1.1 K/UL (ref 1–4.8)
ABSOLUTE MONO #: 0.7 K/UL (ref 0.1–1.3)
ACETAMINOPHEN LEVEL: <5 UG/ML (ref 10–30)
ALBUMIN SERPL-MCNC: 3.8 G/DL (ref 3.5–5.2)
ALP BLD-CCNC: 37 U/L (ref 40–129)
ALT SERPL-CCNC: 8 U/L (ref 5–41)
AMMONIA: 23 UMOL/L (ref 16–60)
ANION GAP SERPL CALCULATED.3IONS-SCNC: 11 MMOL/L (ref 9–17)
AST SERPL-CCNC: 16 U/L
BACTERIA: NORMAL
BASOPHILS # BLD: 1 % (ref 0–2)
BASOPHILS ABSOLUTE: 0.1 K/UL (ref 0–0.2)
BILIRUB SERPL-MCNC: 0.4 MG/DL (ref 0.3–1.2)
BILIRUBIN URINE: NEGATIVE
BUN BLDV-MCNC: 41 MG/DL (ref 8–23)
CALCIUM SERPL-MCNC: 9.7 MG/DL (ref 8.6–10.4)
CASTS UA: NORMAL /LPF
CHLORIDE BLD-SCNC: 104 MMOL/L (ref 98–107)
CO2: 20 MMOL/L (ref 20–31)
COLOR: YELLOW
CREAT SERPL-MCNC: 2.41 MG/DL (ref 0.7–1.2)
EKG ATRIAL RATE: 54 BPM
EKG P AXIS: -8 DEGREES
EKG P-R INTERVAL: 180 MS
EKG Q-T INTERVAL: 428 MS
EKG QRS DURATION: 86 MS
EKG QTC CALCULATION (BAZETT): 405 MS
EKG R AXIS: 15 DEGREES
EKG T AXIS: 49 DEGREES
EKG VENTRICULAR RATE: 54 BPM
EOSINOPHILS RELATIVE PERCENT: 6 % (ref 0–4)
EPITHELIAL CELLS UA: NORMAL /HPF
ETHANOL PERCENT: <0.01 %
ETHANOL: <10 MG/DL
FOLATE: 9.9 NG/ML
GFR AFRICAN AMERICAN: 32 ML/MIN
GFR NON-AFRICAN AMERICAN: 26 ML/MIN
GFR SERPL CREATININE-BSD FRML MDRD: ABNORMAL ML/MIN/{1.73_M2}
GLUCOSE BLD-MCNC: 119 MG/DL (ref 70–99)
GLUCOSE URINE: NEGATIVE
HCT VFR BLD CALC: 39.2 % (ref 41–53)
HEMOGLOBIN: 13.4 G/DL (ref 13.5–17.5)
KETONES, URINE: NEGATIVE
LEUKOCYTE ESTERASE, URINE: NEGATIVE
LYMPHOCYTES # BLD: 9 % (ref 24–44)
MAGNESIUM: 2 MG/DL (ref 1.6–2.6)
MCH RBC QN AUTO: 31.9 PG (ref 26–34)
MCHC RBC AUTO-ENTMCNC: 34.3 G/DL (ref 31–37)
MCV RBC AUTO: 92.8 FL (ref 80–100)
MONOCYTES # BLD: 6 % (ref 1–7)
NITRITE, URINE: NEGATIVE
PDW BLD-RTO: 14.3 % (ref 11.5–14.9)
PH UA: 5 (ref 5–8)
PLATELET # BLD: 194 K/UL (ref 150–450)
PMV BLD AUTO: 8.8 FL (ref 6–12)
POTASSIUM SERPL-SCNC: 4.9 MMOL/L (ref 3.7–5.3)
PROTEIN UA: ABNORMAL
RBC # BLD: 4.22 M/UL (ref 4.5–5.9)
RBC UA: NORMAL /HPF
SALICYLATE LEVEL: <1 MG/DL (ref 3–10)
SEG NEUTROPHILS: 78 % (ref 36–66)
SEGMENTED NEUTROPHILS ABSOLUTE COUNT: 9.5 K/UL (ref 1.3–9.1)
SODIUM BLD-SCNC: 135 MMOL/L (ref 135–144)
SPECIFIC GRAVITY UA: 1.02 (ref 1–1.03)
T. PALLIDUM, IGG: NONREACTIVE
TOTAL PROTEIN: 7.1 G/DL (ref 6.4–8.3)
TOXIC TRICYCLIC SC,BLOOD: ABNORMAL
TRICYCLIC ANTIDEP,URINE: NEGATIVE
TSH SERPL DL<=0.05 MIU/L-ACNC: 1.63 UIU/ML (ref 0.3–5)
TURBIDITY: CLEAR
URINE HGB: NEGATIVE
UROBILINOGEN, URINE: NORMAL
VITAMIN B-12: 456 PG/ML (ref 232–1245)
WBC # BLD: 12.3 K/UL (ref 3.5–11)
WBC UA: NORMAL /HPF

## 2022-10-03 PROCEDURE — 83735 ASSAY OF MAGNESIUM: CPT

## 2022-10-03 PROCEDURE — 80143 DRUG ASSAY ACETAMINOPHEN: CPT

## 2022-10-03 PROCEDURE — 80179 DRUG ASSAY SALICYLATE: CPT

## 2022-10-03 PROCEDURE — 70450 CT HEAD/BRAIN W/O DYE: CPT

## 2022-10-03 PROCEDURE — 99223 1ST HOSP IP/OBS HIGH 75: CPT | Performed by: PSYCHIATRY & NEUROLOGY

## 2022-10-03 PROCEDURE — 81001 URINALYSIS AUTO W/SCOPE: CPT

## 2022-10-03 PROCEDURE — 36415 COLL VENOUS BLD VENIPUNCTURE: CPT

## 2022-10-03 PROCEDURE — 1200000000 HC SEMI PRIVATE

## 2022-10-03 PROCEDURE — 80053 COMPREHEN METABOLIC PANEL: CPT

## 2022-10-03 PROCEDURE — 93005 ELECTROCARDIOGRAM TRACING: CPT | Performed by: EMERGENCY MEDICINE

## 2022-10-03 PROCEDURE — 6360000002 HC RX W HCPCS: Performed by: FAMILY MEDICINE

## 2022-10-03 PROCEDURE — 84443 ASSAY THYROID STIM HORMONE: CPT

## 2022-10-03 PROCEDURE — 93010 ELECTROCARDIOGRAM REPORT: CPT | Performed by: INTERNAL MEDICINE

## 2022-10-03 PROCEDURE — 82607 VITAMIN B-12: CPT

## 2022-10-03 PROCEDURE — G0480 DRUG TEST DEF 1-7 CLASSES: HCPCS

## 2022-10-03 PROCEDURE — 99285 EMERGENCY DEPT VISIT HI MDM: CPT

## 2022-10-03 PROCEDURE — 86780 TREPONEMA PALLIDUM: CPT

## 2022-10-03 PROCEDURE — 85025 COMPLETE CBC W/AUTO DIFF WBC: CPT

## 2022-10-03 PROCEDURE — 80307 DRUG TEST PRSMV CHEM ANLYZR: CPT

## 2022-10-03 PROCEDURE — 82746 ASSAY OF FOLIC ACID SERUM: CPT

## 2022-10-03 PROCEDURE — 82140 ASSAY OF AMMONIA: CPT

## 2022-10-03 RX ORDER — HALOPERIDOL 5 MG/ML
2 INJECTION INTRAMUSCULAR ONCE
Status: COMPLETED | OUTPATIENT
Start: 2022-10-03 | End: 2022-10-03

## 2022-10-03 RX ORDER — ENOXAPARIN SODIUM 100 MG/ML
30 INJECTION SUBCUTANEOUS DAILY
Status: DISCONTINUED | OUTPATIENT
Start: 2022-10-03 | End: 2022-10-20 | Stop reason: HOSPADM

## 2022-10-03 RX ORDER — SODIUM CHLORIDE 0.9 % (FLUSH) 0.9 %
5-40 SYRINGE (ML) INJECTION PRN
Status: DISCONTINUED | OUTPATIENT
Start: 2022-10-03 | End: 2022-10-20 | Stop reason: HOSPADM

## 2022-10-03 RX ORDER — HALOPERIDOL 5 MG/ML
2 INJECTION INTRAMUSCULAR EVERY 8 HOURS PRN
Status: DISCONTINUED | OUTPATIENT
Start: 2022-10-03 | End: 2022-10-04

## 2022-10-03 RX ORDER — ONDANSETRON 4 MG/1
4 TABLET, ORALLY DISINTEGRATING ORAL EVERY 8 HOURS PRN
Status: DISCONTINUED | OUTPATIENT
Start: 2022-10-03 | End: 2022-10-20 | Stop reason: HOSPADM

## 2022-10-03 RX ORDER — ACETAMINOPHEN 325 MG/1
650 TABLET ORAL EVERY 4 HOURS PRN
Status: DISCONTINUED | OUTPATIENT
Start: 2022-10-03 | End: 2022-10-20 | Stop reason: HOSPADM

## 2022-10-03 RX ORDER — SODIUM CHLORIDE 0.9 % (FLUSH) 0.9 %
5-40 SYRINGE (ML) INJECTION EVERY 12 HOURS SCHEDULED
Status: DISCONTINUED | OUTPATIENT
Start: 2022-10-03 | End: 2022-10-20 | Stop reason: HOSPADM

## 2022-10-03 RX ORDER — SODIUM CHLORIDE 9 MG/ML
INJECTION, SOLUTION INTRAVENOUS PRN
Status: DISCONTINUED | OUTPATIENT
Start: 2022-10-03 | End: 2022-10-20 | Stop reason: HOSPADM

## 2022-10-03 RX ORDER — ONDANSETRON 2 MG/ML
4 INJECTION INTRAMUSCULAR; INTRAVENOUS EVERY 6 HOURS PRN
Status: DISCONTINUED | OUTPATIENT
Start: 2022-10-03 | End: 2022-10-20 | Stop reason: HOSPADM

## 2022-10-03 RX ADMIN — HALOPERIDOL LACTATE 2 MG: 5 INJECTION, SOLUTION INTRAMUSCULAR at 18:42

## 2022-10-03 RX ADMIN — ENOXAPARIN SODIUM 30 MG: 100 INJECTION SUBCUTANEOUS at 14:48

## 2022-10-03 ASSESSMENT — ENCOUNTER SYMPTOMS
TROUBLE SWALLOWING: 0
EYE REDNESS: 0
COUGH: 0
RHINORRHEA: 0
SHORTNESS OF BREATH: 0
BACK PAIN: 0
NAUSEA: 0
ABDOMINAL PAIN: 0
COLOR CHANGE: 0
SINUS PRESSURE: 0
FACIAL SWELLING: 0
EYE DISCHARGE: 0
WHEEZING: 0
EYE PAIN: 0
VOMITING: 0
CONSTIPATION: 0
SORE THROAT: 0
BLOOD IN STOOL: 0
CHEST TIGHTNESS: 0
DIARRHEA: 0

## 2022-10-03 ASSESSMENT — PAIN SCALES - GENERAL
PAINLEVEL_OUTOF10: 0
PAINLEVEL_OUTOF10: 0

## 2022-10-03 ASSESSMENT — PAIN - FUNCTIONAL ASSESSMENT: PAIN_FUNCTIONAL_ASSESSMENT: NONE - DENIES PAIN

## 2022-10-03 NOTE — TELEPHONE ENCOUNTER
Lee Ramirez (son - on hippvani) called. He states Mom has called him several times in the last 2 months about Dad. He walked outside with his underwear and t-shirt, didn't know who she was, he is combative, wetting bed. He states he would like him admitted into a home. He has discussed options with previous PCP. I told him they would have to take him to ER and have them admit him for altered mental status for 3 days. They should be able to admit him and do testing,  should be able to set up a nursing facility for him.

## 2022-10-03 NOTE — PROGRESS NOTES
Dr. Sky Hughes notified that of patients admission and no medication orders. Dr. Sky Hughes will be up to see patient. Order received for 2 mg IM haldol once.

## 2022-10-03 NOTE — PROGRESS NOTES
Medication History completed:    New medications: none    Medications discontinued: none    Changes to dosing:   Azelastine is instilled twice daily    Stated allergies: As listed    Other pertinent information:  Medications confirmed with [facility] and [facility].      Thank you,  Tatyana Tellez PharmD, BCPS  [phone]

## 2022-10-03 NOTE — LETTER
[address] [facility] [address]  Phone: [phone]                     José Miguel Cardoza was in our facility, [facility], with her family on 10/4/2022.           Niraj Thor

## 2022-10-03 NOTE — PLAN OF CARE
Called unit regarding sending for pt for MRI. Per Lily(RN) waiting for Dr. Nikki Nieves to come see pt and order meds for MRI. Nurse states pt very restless and will not hold still without meds for MRI. Will let us know.

## 2022-10-03 NOTE — ED PROVIDER NOTES
[facility] ED  eMERGENCY dEPARTMENT eNCOUnter      Pt Name: Moise Champion  MRN: [id number]  [place] 1945  Date of evaluation: 10/3/22      CHIEF COMPLAINT       Chief Complaint   Patient presents with    Altered Mental Status         HISTORY OF PRESENT ILLNESS    Moise Champion is a 65 y.o. male who presents complaining of altered mental status. Patient was brought in by family due to altered mental status and combativeness. Patient evidently has been getting evaluated for possibility of having dementia and possibly needing long-term placement due to inability to care for him at home. Patient evidently has some bad days where he gets a little agitated. Son states that he got called today because the patient started becoming combative with his wife was outside walking around and just his underwear and not really knowing what was going on. When I asked the patient about it he does not remember this. Patient states that he is not having any pain he is not been sick recently and has had no difficulty urinating though the family does state that he has been urinating more than normal.      REVIEW OF SYSTEMS       Review of Systems   Constitutional:  Negative for activity change, appetite change, chills, diaphoresis and fever. HENT:  Negative for congestion, ear pain, facial swelling, nosebleeds, rhinorrhea, sinus pressure, sore throat and trouble swallowing. Eyes:  Negative for pain, discharge and redness. Respiratory:  Negative for cough, chest tightness, shortness of breath and wheezing. Cardiovascular:  Negative for chest pain, palpitations and leg swelling. Gastrointestinal:  Negative for abdominal pain, blood in stool, constipation, diarrhea, nausea and vomiting. Genitourinary:  Negative for difficulty urinating, dysuria, flank pain, frequency, genital sores and hematuria. Musculoskeletal:  Negative for arthralgias, back pain, gait problem, joint swelling, myalgias and neck pain. Skin:  Negative for color change, pallor, rash and wound. Neurological:  Negative for dizziness, tremors, seizures, syncope, speech difficulty, weakness, numbness and headaches. Psychiatric/Behavioral:  Positive for confusion. Negative for decreased concentration, hallucinations, self-injury, sleep disturbance and suicidal ideas. PAST MEDICAL HISTORY     Past Medical History:   Diagnosis Date    Arthritis     Aspirin long-term use     Back pain     Chronic fatigue 10/7/2020    Chronic kidney disease     CKD (chronic kidney disease), stage III ([address] 8/9/2017    Baseline creatinine 1.4-1.5. Proteinuria reported by primary physician. Workup in progress. Essential hypertension 8/9/2017    Fall at home 12/09/2019    Family history of prostate problems     Heart disease     Hiatal hernia     HTN (hypertension)     Hyperlipemia     Interstitial nephritis chronic 9/28/2020    Kidney bx 9/25/20 shows chronic interstitial nephritis with hypertensive changes. Likely cause NSAIDs     Isolated proteinuria 8/9/2017    Nonnephrotic, being quantified. Kidney disease, chronic, stage IV (severe, EGFR 15-29 ml/min) ([facility]) 8/9/2017    Baseline creatinine 1.4-1.5. Proteinuria reported by primary physician. Likely from long term NSAID use. Workup for immune complex, paraprotein disease negative. Proteinuria fluctuates between 1-2 g.  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    Appears to be primary    MVP (mitral valve prolapse)     Neck pain     Nephrotic syndrome 10/7/2020    NSAID long-term use 8/9/2017    Primary osteoarthritis of hand 8/9/2017    Urination, excessive at night     Vascular dementia without behavioral disturbance ([address] 9/7/2022       SURGICAL HISTORY       Past Surgical History:   Procedure Laterality Date    ANKLE SURGERY      COLONOSCOPY      CT BIOPSY RENAL  9/22/2020    CT BIOPSY RENAL 9/22/2020 [facility] SPECIAL PROCEDURES    INGUINAL HERNIA REPAIR  12/1/08    [facility]    LUMBAR DISC SURGERY      NECK SURGERY      SPINE SURGERY      TONSILLECTOMY AND ADENOIDECTOMY         CURRENT MEDICATIONS       Previous Medications    ALLOPURINOL (ZYLOPRIM) 300 MG TABLET    Take 300 mg by mouth daily    ASPIRIN 81 MG TABLET    Take 81 mg by mouth daily    AZELASTINE (OPTIVAR) 0.05 % OPHTHALMIC SOLUTION    Place 1 drop into both eyes daily    CARVEDILOL (COREG) 6.25 MG TABLET    Take 1 tablet by mouth 2 times daily    GALANTAMINE (RAZADYNE ER) 8 MG EXTENDED RELEASE CAPSULE    Take 8 mg by mouth daily (with breakfast)    MELATONIN 10 MG CAPS CAPSULE    Take 10 mg by mouth every evening    MULTIPLE VITAMINS-MINERALS (OCUVITE PO)    Take by mouth    POLYETHYL GLYCOL-PROPYL GLYCOL (SYSTANE OP)    Apply to eye 3 times daily as needed Both eyes TID     SPIRONOLACTONE (ALDACTONE) 25 MG TABLET    Take 1 tablet by mouth daily    TAMSULOSIN (FLOMAX) 0.4 MG CAPSULE    TAKE 1 CAPSULE DAILY       ALLERGIES     is allergic to other. SOCIAL HISTORY      reports that he quit smoking about 47 years ago. His smoking use included cigarettes. He started smoking about 62 years ago. He has a 7.50 pack-year smoking history. He has never used smokeless tobacco. He reports that he does not drink alcohol and does not use drugs. PHYSICAL EXAM     INITIAL VITALS: BP (!) 148/87   Pulse 52   Temp 98.1 °F (36.7 °C) (Oral)   Resp 16   Ht 5' 11\" (1.803 m)   Wt 206 lb (93.4 kg)   SpO2 98%   BMI 28.73 kg/m²      Physical Exam  Vitals and nursing note reviewed. Constitutional:       General: He is not in acute distress. Appearance: He is well-developed. He is not diaphoretic. HENT:      Head: Normocephalic and atraumatic. Eyes:      General: No scleral icterus.         Right eye: No discharge. Left eye: No discharge. Conjunctiva/sclera: Conjunctivae normal.      Pupils: Pupils are equal, round, and reactive to light. Cardiovascular:      Rate and Rhythm: Normal rate and regular rhythm. Heart sounds: Normal heart sounds. No murmur heard. No friction rub. No gallop. Pulmonary:      Effort: Pulmonary effort is normal. No respiratory distress. Breath sounds: Normal breath sounds. No wheezing or rales. Chest:      Chest wall: No tenderness. Abdominal:      General: Bowel sounds are normal. There is no distension. Palpations: Abdomen is soft. There is no mass. Tenderness: no abdominal tenderness There is no guarding or rebound. Musculoskeletal:         General: No tenderness. Normal range of motion. Skin:     General: Skin is warm and dry. Coloration: Skin is not pale. Findings: No erythema or rash. Neurological:      Mental Status: He is alert. He is disoriented and confused. Cranial Nerves: No cranial nerve deficit. Sensory: No sensory deficit. Motor: No weakness or abnormal muscle tone. Coordination: Coordination normal.      Deep Tendon Reflexes: Reflexes normal.   Psychiatric:         Behavior: Behavior normal.         Thought Content: Thought content normal.         Judgment: Judgment normal.       DIAGNOSTIC RESULTS     RADIOLOGY:All plain film, CT,MRI, and formal ultrasound images (except ED bedside ultrasound) are read by the radiologist and the interpretations are directly viewed by the emergency physician. CT HEAD WO CONTRAST    Result Date: 10/3/2022  EXAMINATION: CT OF THE HEAD WITHOUT CONTRAST  10/3/2022 11:27 am TECHNIQUE: CT of the head was performed without the administration of intravenous contrast. Automated exposure control, iterative reconstruction, and/or weight based adjustment of the mA/kV was utilized to reduce the radiation dose to as low as reasonably achievable.  COMPARISON: 01/21/2021 HISTORY: ORDERING SYSTEM PROVIDED HISTORY: Mental Status Changes TECHNOLOGIST PROVIDED HISTORY: Mental Status Changes Decision Support Exception - unselect if not a suspected or confirmed emergency medical condition->Emergency Medical Condition (MA) Reason for Exam: AMS. FINDINGS: BRAIN/VENTRICLES: There is no acute intracranial hemorrhage, mass effect or midline shift. No abnormal extra-axial fluid collection. The gray-white differentiation is maintained without evidence of an acute infarct. There is no evidence of hydrocephalus. ORBITS: The visualized portion of the orbits demonstrate no acute abnormality. SINUSES: There is mild mucosal thickening in the right frontal, ethmoid, and bilateral sphenoid sinuses. Mild mucosal thickening in the right maxillary sinus. There are bubbly secretions in the posterior right ethmoid sinuses. No air-fluid level. Mastoid air cells are aerated. SOFT TISSUES/SKULL:  No acute abnormality of the visualized skull or soft tissues. No acute intracranial abnormality. LABS: All lab results were reviewed by myself, and all abnormals are listed below.   Labs Reviewed   CBC WITH AUTO DIFFERENTIAL - Abnormal; Notable for the following components:       Result Value    WBC 12.3 (*)     RBC 4.22 (*)     Hemoglobin 13.4 (*)     Hematocrit 39.2 (*)     Seg Neutrophils 78 (*)     Lymphocytes 9 (*)     Eosinophils % 6 (*)     Segs Absolute 9.50 (*)     Absolute Eos # 0.80 (*)     All other components within normal limits   COMPREHENSIVE METABOLIC PANEL - Abnormal; Notable for the following components:    Glucose 119 (*)     BUN 41 (*)     Creatinine 2.41 (*)     Alkaline Phosphatase 37 (*)     GFR Non- 26 (*)     GFR  32 (*)     All other components within normal limits   TOX SCR, BLD, ED - Abnormal; Notable for the following components:    Acetaminophen Level <5 (*)     Salicylate Lvl <1 (*)     All other components within normal limits URINALYSIS WITH REFLEX TO CULTURE - Abnormal; Notable for the following components:    Protein, UA 3+ (*)     All other components within normal limits   AMMONIA   MAGNESIUM   DRUG SCREEN TRICYCLIC URINE   MICROSCOPIC URINALYSIS     EKG Interpretation    Interpreted by emergency department physician    Rhythm: sinus bradycardia  Rate: bradycardia  Axis: normal  Ectopy: none  Conduction: normal  ST Segments: normal  T Waves: normal  Q Waves: none    Clinical Impression: EKG: sinus bradycardia. Trevor Flores MD      MEDICAL DECISION MAKING:     Currently the patient is not combative he is confused sounds like maybe he is at his baseline with his disorientation but from the sounds of what was going on earlier am definitely concerned about his ability to be at home right now so we will do an altered mental status work-up with the probable disposition of admission so that they can do full further evaluation for possible nursing home placement. EMERGENCY DEPARTMENT COURSE:   Vitals:    Vitals:    10/03/22 1011 10/03/22 1143 10/03/22 1234   BP: (!) 151/82 (!) 158/82 (!) 148/87   Pulse: 55 55 52   Resp: 16 16 16   Temp: 98.1 °F (36.7 °C)     TempSrc: Oral     SpO2: 98% 98% 98%   Weight: 206 lb (93.4 kg)     Height: 5' 11\" (1.803 m)         The patient was given the following medications while in the emergency department:  No orders of the defined types were placed in this encounter. -------------------------  1:16 PM EDT  Patient has been fully evaluated and at this point time no clear cause for his altered mental status other than possibly just worsening of his dementia. Once towards family we will go ahead and get the patient admitted. I did speak with Dr. Toña Gallegos the PCP who agrees to the admission. CONSULTS:  IP CONSULT TO PRIMARY CARE PROVIDER  IP CONSULT TO NEUROLOGY    PROCEDURES:  None    FINAL IMPRESSION      1.  Altered mental status, unspecified altered mental status type DISPOSITION/PLAN   DISPOSITION Decision To Admit 10/03/2022 01:11:13 PM      PATIENT REFERREDTO:  No follow-up provider specified.     DISCHARGEMEDICATIONS:  New Prescriptions    No medications on file       (Please note that portions of this note were completed with a voice recognition program.  Efforts were made to edit thedictations but occasionally words are mis-transcribed.)    Sherita Deleon MD  Attending Emergency Physician                        Sherita Deleon MD  10/03/22 0363
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
## 2022-10-03 NOTE — LETTER
[address]  [place] [address]  Phone: [phone]                   Please excuse Neljose Pipes from work on 10/4/2022, as she was in our facility, [facility], with her family.                        Machelle Santos

## 2022-10-04 ENCOUNTER — APPOINTMENT (OUTPATIENT)
Dept: NEUROLOGY | Age: 77
DRG: 884 | End: 2022-10-04
Payer: MEDICARE

## 2022-10-04 ENCOUNTER — APPOINTMENT (OUTPATIENT)
Dept: VASCULAR LAB | Age: 77
DRG: 884 | End: 2022-10-04
Payer: MEDICARE

## 2022-10-04 PROBLEM — R41.89 COGNITIVE IMPAIRMENT: Status: ACTIVE | Noted: 2022-10-04

## 2022-10-04 PROBLEM — R41.3 MEMORY LOSS: Status: ACTIVE | Noted: 2022-10-04

## 2022-10-04 PROBLEM — G93.40 ACUTE ENCEPHALOPATHY: Status: ACTIVE | Noted: 2022-10-04

## 2022-10-04 PROCEDURE — 6360000002 HC RX W HCPCS: Performed by: FAMILY MEDICINE

## 2022-10-04 PROCEDURE — 2580000003 HC RX 258: Performed by: FAMILY MEDICINE

## 2022-10-04 PROCEDURE — 6370000000 HC RX 637 (ALT 250 FOR IP): Performed by: PSYCHIATRY & NEUROLOGY

## 2022-10-04 PROCEDURE — 95816 EEG AWAKE AND DROWSY: CPT | Performed by: PSYCHIATRY & NEUROLOGY

## 2022-10-04 PROCEDURE — 1200000000 HC SEMI PRIVATE

## 2022-10-04 PROCEDURE — 93880 EXTRACRANIAL BILAT STUDY: CPT

## 2022-10-04 PROCEDURE — 95816 EEG AWAKE AND DROWSY: CPT

## 2022-10-04 PROCEDURE — 99232 SBSQ HOSP IP/OBS MODERATE 35: CPT | Performed by: PSYCHIATRY & NEUROLOGY

## 2022-10-04 PROCEDURE — 6370000000 HC RX 637 (ALT 250 FOR IP): Performed by: FAMILY MEDICINE

## 2022-10-04 RX ORDER — GALANTAMINE HYDROBROMIDE 4 MG/1
8 TABLET, FILM COATED ORAL 2 TIMES DAILY WITH MEALS
Status: DISCONTINUED | OUTPATIENT
Start: 2022-10-04 | End: 2022-10-20 | Stop reason: HOSPADM

## 2022-10-04 RX ORDER — HALOPERIDOL 5 MG/ML
5 INJECTION INTRAMUSCULAR EVERY 8 HOURS PRN
Status: DISCONTINUED | OUTPATIENT
Start: 2022-10-04 | End: 2022-10-20 | Stop reason: HOSPADM

## 2022-10-04 RX ORDER — LANOLIN ALCOHOL/MO/W.PET/CERES
9 CREAM (GRAM) TOPICAL EVERY EVENING
Status: DISCONTINUED | OUTPATIENT
Start: 2022-10-04 | End: 2022-10-20 | Stop reason: HOSPADM

## 2022-10-04 RX ORDER — CARVEDILOL 6.25 MG/1
6.25 TABLET ORAL 2 TIMES DAILY
Status: DISCONTINUED | OUTPATIENT
Start: 2022-10-04 | End: 2022-10-06

## 2022-10-04 RX ORDER — ALLOPURINOL 300 MG/1
300 TABLET ORAL DAILY
Status: DISCONTINUED | OUTPATIENT
Start: 2022-10-04 | End: 2022-10-20 | Stop reason: HOSPADM

## 2022-10-04 RX ORDER — TAMSULOSIN HYDROCHLORIDE 0.4 MG/1
0.4 CAPSULE ORAL DAILY
Status: DISCONTINUED | OUTPATIENT
Start: 2022-10-04 | End: 2022-10-20 | Stop reason: HOSPADM

## 2022-10-04 RX ORDER — HALOPERIDOL 5 MG/ML
2 INJECTION INTRAMUSCULAR ONCE
Status: COMPLETED | OUTPATIENT
Start: 2022-10-04 | End: 2022-10-04

## 2022-10-04 RX ORDER — QUETIAPINE FUMARATE 100 MG/1
100 TABLET, FILM COATED ORAL NIGHTLY
Status: DISCONTINUED | OUTPATIENT
Start: 2022-10-04 | End: 2022-10-20 | Stop reason: HOSPADM

## 2022-10-04 RX ORDER — SPIRONOLACTONE 25 MG/1
25 TABLET ORAL DAILY
Status: DISCONTINUED | OUTPATIENT
Start: 2022-10-04 | End: 2022-10-18

## 2022-10-04 RX ORDER — HALOPERIDOL 5 MG/ML
3 INJECTION INTRAMUSCULAR ONCE
Status: COMPLETED | OUTPATIENT
Start: 2022-10-04 | End: 2022-10-04

## 2022-10-04 RX ORDER — ASPIRIN 81 MG/1
81 TABLET ORAL DAILY
Status: DISCONTINUED | OUTPATIENT
Start: 2022-10-04 | End: 2022-10-17

## 2022-10-04 RX ORDER — AZELASTINE HYDROCHLORIDE 0.5 MG/ML
1 SOLUTION/ DROPS OPHTHALMIC 2 TIMES DAILY
Status: DISCONTINUED | OUTPATIENT
Start: 2022-10-04 | End: 2022-10-04 | Stop reason: RX

## 2022-10-04 RX ADMIN — QUETIAPINE FUMARATE 100 MG: 100 TABLET ORAL at 20:07

## 2022-10-04 RX ADMIN — SPIRONOLACTONE 25 MG: 25 TABLET ORAL at 13:47

## 2022-10-04 RX ADMIN — HALOPERIDOL LACTATE 3 MG: 5 INJECTION, SOLUTION INTRAMUSCULAR at 13:38

## 2022-10-04 RX ADMIN — CARVEDILOL 6.25 MG: 6.25 TABLET, FILM COATED ORAL at 20:07

## 2022-10-04 RX ADMIN — TAMSULOSIN HYDROCHLORIDE 0.4 MG: 0.4 CAPSULE ORAL at 13:46

## 2022-10-04 RX ADMIN — HALOPERIDOL LACTATE 2 MG: 5 INJECTION, SOLUTION INTRAMUSCULAR at 06:02

## 2022-10-04 RX ADMIN — ASPIRIN 81 MG: 81 TABLET, COATED ORAL at 13:46

## 2022-10-04 RX ADMIN — SODIUM CHLORIDE, PRESERVATIVE FREE 10 ML: 5 INJECTION INTRAVENOUS at 20:08

## 2022-10-04 RX ADMIN — Medication 9 MG: at 20:07

## 2022-10-04 RX ADMIN — HALOPERIDOL LACTATE 2 MG: 5 INJECTION, SOLUTION INTRAMUSCULAR at 07:59

## 2022-10-04 RX ADMIN — CARVEDILOL 6.25 MG: 6.25 TABLET, FILM COATED ORAL at 13:47

## 2022-10-04 RX ADMIN — ALLOPURINOL 300 MG: 300 TABLET ORAL at 13:46

## 2022-10-04 RX ADMIN — GALANTAMINE 8 MG: 4 TABLET, FILM COATED ORAL at 20:07

## 2022-10-04 ASSESSMENT — PAIN SCALES - GENERAL: PAINLEVEL_OUTOF10: 0

## 2022-10-04 NOTE — PROGRESS NOTES
Writer spoke with Dr. Annmarie Peñaloza in regards to pt agitation and combativeness. Dr. Annmarie Peñaloza gave telephone with readback order for a one time dose of Haldol 3mg IM and to modify his PRN Haldol order from 2 mg every 8hours to 5mg every 8 hours.

## 2022-10-04 NOTE — PROGRESS NOTES
Encounter   Medication Sig Dispense Refill    carvedilol (COREG) 6.25 MG tablet Take 1 tablet by mouth 2 times daily 180 tablet 3    spironolactone (ALDACTONE) 25 MG tablet Take 1 tablet by mouth daily 90 tablet 3    melatonin 10 MG CAPS capsule Take 10 mg by mouth every evening      galantamine (RAZADYNE ER) 8 MG extended release capsule Take 8 mg by mouth daily (with breakfast)      tamsulosin (FLOMAX) 0.4 MG capsule TAKE 1 CAPSULE DAILY 90 capsule 3    Polyethyl Glycol-Propyl Glycol (SYSTANE OP) Apply to eye 3 times daily as needed Both eyes TID       azelastine (OPTIVAR) 0.05 % ophthalmic solution Place 1 drop into both eyes 2 times daily      aspirin 81 MG EC tablet Take 81 mg by mouth daily      Multiple Vitamins-Minerals (OCUVITE PO) Take by mouth      allopurinol (ZYLOPRIM) 300 MG tablet Take 300 mg by mouth daily       Allergies: Moise Champion is allergic to other. Past Medical History:   Diagnosis Date    Arthritis     Aspirin long-term use     Back pain     Chronic fatigue 10/7/2020    Chronic kidney disease     CKD (chronic kidney disease), stage III ([facility] [address] 8/9/2017    Baseline creatinine 1.4-1.5. Proteinuria reported by primary physician. Workup in progress. Essential hypertension 8/9/2017    Fall at home 12/09/2019    Family history of prostate problems     Heart disease     Hiatal hernia     HTN (hypertension)     Hyperlipemia     Interstitial nephritis chronic 9/28/2020    Kidney bx 9/25/20 shows chronic interstitial nephritis with hypertensive changes. Likely cause NSAIDs     Isolated proteinuria 8/9/2017    Nonnephrotic, being quantified. Kidney disease, chronic, stage IV (severe, EGFR 15-29 ml/min) ([facility]) 8/9/2017    Baseline creatinine 1.4-1.5. Proteinuria reported by primary physician. Likely from long term NSAID use. Workup for immune complex, paraprotein disease negative. Proteinuria fluctuates between 1-2 g.  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    Appears to be primary    MVP (mitral valve prolapse)     Neck pain     Nephrotic syndrome 10/7/2020    NSAID long-term use 8/9/2017    Primary osteoarthritis of hand 8/9/2017    Urination, excessive at night     Vascular dementia without behavioral disturbance ([address] 9/7/2022       Past Surgical History:   Procedure Laterality Date    ANKLE SURGERY      COLONOSCOPY      CT BIOPSY RENAL  9/22/2020    CT BIOPSY RENAL 9/22/2020 [facility] PROCEDURES    INGUINAL HERNIA REPAIR  12/1/08    [facility]    LUMBAR DISC SURGERY      NECK SURGERY      SPINE SURGERY      TONSILLECTOMY AND ADENOIDECTOMY       Social History: Javier Rees  reports that he quit smoking about 47 years ago. His smoking use included cigarettes. He started smoking about 62 years ago. He has a 7.50 pack-year smoking history. He has never used smokeless tobacco. He reports that he does not drink alcohol and does not use drugs.     Family History   Family history unknown: Yes       Current Medications:     allopurinol  300 mg Oral Daily    aspirin  81 mg Oral Daily    azelastine  1 drop Both Eyes BID    carvedilol  6.25 mg Oral BID    melatonin  9 mg Oral QPM    spironolactone  25 mg Oral Daily    tamsulosin  0.4 mg Oral Daily    QUEtiapine  100 mg Oral Nightly    sodium chloride flush  5-40 mL IntraVENous 2 times per day    enoxaparin  30 mg SubCUTAneous Daily     PRN Meds include: haloperidol lactate, sodium chloride flush, sodium chloride, acetaminophen, ondansetron **OR** ondansetron    ROS:   Constitutional Negative for fever and chills   HEENT Negative for ear discharge, ear pain, nosebleed   Eyes Negative for photophobia, pain and discharge   Respiratory Negative for hemoptysis and sputum   Cardiovascular Negative for orthopnea, claudication and PND   Gastrointestinal Negative for abdominal pain, diarrhea, blood in stool   Musculoskeletal Negative for joint pain, negative for myalgia   Skin Negative for rash or itching   Endo/heme/allergies Negative for polydipsia, environmental allergy   Psychiatric Negative for suicidal ideation. Patient is not anxious           Objective:   BP (!) 159/91   Pulse 67   Temp 97.5 °F (36.4 °C)   Resp 18   Ht 5' 11\" (1.803 m)   Wt 206 lb (93.4 kg)   SpO2 95%   BMI 28.73 kg/m²     Blood pressure range: Systolic (37HTY), FCA:490 , Min:128 , TDT:972   ; Diastolic (80LOR), HAV:69, Min:68, Max:91      Continuous infusions:    sodium chloride         ON EXAMINATION:  GENERAL  Appears comfortable and in no distress   HEENT  NC/ AT   NECK  Supple and no bruits heard   Cardiovascular  S1, S2 heard; radial pulse intact   MENTAL STATUS:  Alert, oriented x self and place and month but not to year; followed 3 step commands well;   able to name the objects and repeat sentences; Couldn't recall any of the 3 test items at 5 min on formal memory testing., could not do serial subtractions; No hallucinations or delusions. CRANIAL NERVES: II     -       Pupils reactive b/l., Fundus exam: intact venous pulsations;  Visual fields intact to confrontation  III,IV,VI -  EOMs full, no afferent defect, no                      JUAN R, no ptosis  V     -     Normal facial sensation  VII    -     Normal facial symmetry  VIII   -     Intact hearing  IX,X -     Symmetrical palate  XI    -     Symmetrical shoulder shrug  XII   -     Midline tongue, no atrophy    MOTOR FUNCTION:  Normal bulk, normal tone, normal power;  no involuntary movements, no tremor   SENSORY FUNCTION:  Normal touch, normal pin, normal vibration, normal proprioception   CEREBELLAR FUNCTION:  Intact fine motor control over upper limbs   REFLEX FUNCTION:  Symmetric, no perverted reflex, no Babinski sign   STATION and GAIT  Normal station; able to walk with assistance         Data:    Lab Results:     Lab Results   Component Value Date    ALT 8 10/03/2022    AST 16 10/03/2022    TSH 1.63 10/03/2022    INR 1.0 09/22/2020    [id number] 456 10/03/2022     Hematology:  Recent Labs     10/03/22  1044   WBC 12.3*   HGB 13.4*   HCT 39.2*        Chemistry:  Recent Labs     10/03/22  1044      K 4.9      CO2 20   GLUCOSE 119*   BUN 41*   CREATININE 2.41*   MG 2.0   CALCIUM 9.7     Recent Labs     10/03/22  1044 10/03/22  1640   PROT 7.1  --    LABALBU 3.8  --    TSH  --  1.63   AST 16  --    ALT 8  --    AMMONIA 23  --        No results found for: SEDRATE  Lab Results   Component Value Date    [id number] 017 10/03/2022      Lab Results   Component Value Date    FOLATE 9.9 10/03/2022     No results found for: VITD25  Lab Results   Component Value Date    JONNA NEGATIVE 01/02/2021     Lab Results   Component Value Date    TSH 1.63 10/03/2022       Treponemal Ab 10/3/22: nonreactive. Diagnostic data reviewed:  CT head 10/3/2022: No acute intracranial abnormalities. Carotid Dopplers : pending    EEG 10/4/22: Diffusely slow background compatible with underlying diffuse cortical disease seen in patients with moderately severe dementia. But no epileptiform discharges and electrographic seizures. MRI Brain: pending            Impression and Plan: Mr. Em Parmar is a 65 y.o. male with   Acute encephalopathy superimposed on slowly progressive memory loss; behavioral changes with increased aggression/agitation; neurologic exam significant for mild-moderate cognitive impairment; EEG showed diffuse slowing compatible with underlying moderately severe dementia. Will get MRI brain, carotid Dopplers. G31, folic acid, TSH, treponemal ab are unrevealing. Comorbid conditions include hypertension, hyperlipidemia, chronic kidney disease. Care plan is discussed with the patient, his wife and son at bedside and patient's nurse. Will follow with you. This note was partially created using voice recognition software and is inherently subject to errors including those of syntax and \"sound alike\" substitutions which may escape proofreading. In such instances, original meaning may be extrapolated by contextual derivation.   Ania Rivera MD 10/4/2022 2:57 PM

## 2022-10-04 NOTE — PLAN OF CARE
Problem: Discharge Planning  Goal: Discharge to home or other facility with appropriate resources  10/4/2022 0332 by Irina Mars RN  Outcome: Progressing  10/3/2022 1612 by Mesfin John RN  Outcome: Progressing  Flowsheets (Taken 10/3/2022 1429 by Beronica Dempsey RN)  Discharge to home or other facility with appropriate resources: Identify barriers to discharge with patient and caregiver     Problem: Safety - Adult  Goal: Free from fall injury  10/4/2022 0332 by Irina Mars RN  Outcome: Progressing  10/3/2022 1612 by Mesfin John RN  Outcome: Progressing     Problem: Pain  Goal: Verbalizes/displays adequate comfort level or baseline comfort level  10/4/2022 0332 by Irina Mars RN  Outcome: Progressing  10/3/2022 1612 by Mesfin John RN  Outcome: Progressing

## 2022-10-04 NOTE — ACP (ADVANCE CARE PLANNING)
Advance Care Planning     Advance Care Planning Activator (Inpatient)  Conversation Note      Date of ACP Conversation: 10/4/2022     Conversation Conducted with: Patient with Emely 51: Next of Kin by law (only applies in absence of above) (name) Spouse  Declan Hunt     ACP Activator: Mariaelena Wills RN        Health Care Decision Maker:     Current Designated Health Care Decision Maker:     Primary Decision Maker: Randy Bourgeois - Spouse - [phone]  Click here to complete Healthcare Decision Makers including section of the Healthcare Decision Maker Relationship (ie \"Primary\")  Today we documented Decision Maker(s) consistent with Legal Next of Kin hierarchy. Care Preferences    Ventilation: \"If you were in your present state of health and suddenly became very ill and were unable to breathe on your own, what would your preference be about the use of a ventilator (breathing machine) if it were available to you? \"      Would the patient desire the use of ventilator (breathing machine)?: yes    \"If your health worsens and it becomes clear that your chance of recovery is unlikely, what would your preference be about the use of a ventilator (breathing machine) if it were available to you? \"     Would the patient desire the use of ventilator (breathing machine)?: Yes      Resuscitation  \"CPR works best to restart the heart when there is a sudden event, like a heart attack, in someone who is otherwise healthy. Unfortunately, CPR does not typically restart the heart for people who have serious health conditions or who are very sick. \"    \"In the event your heart stopped as a result of an underlying serious health condition, would you want attempts to be made to restart your heart (answer \"yes\" for attempt to resuscitate) or would you prefer a natural death (answer \"no\" for do not attempt to resuscitate)? \" yes       [x] Yes   [] No   Educated Patient / Decision Maker regarding differences between Advance Directives and portable DNR orders.     Length of ACP Conversation in minutes:      Conversation Outcomes:  [x] ACP discussion completed  [] Existing advance directive reviewed with patient; no changes to patient's previously recorded wishes  [] New Advance Directive completed  [] Portable Do Not Rescitate prepared for Provider review and signature  [] POLST/POST/MOLST/MOST prepared for Provider review and signature      Follow-up plan:    [] Schedule follow-up conversation to continue planning  [] Referred individual to Provider for additional questions/concerns   [] Advised patient/agent/surrogate to review completed ACP document and update if needed with changes in condition, patient preferences or care setting    [] This note routed to one or more involved healthcare providers

## 2022-10-04 NOTE — CARE COORDINATION
CASE MANAGEMENT NOTE:    Admission Date:  10/3/2022 Penelope Gee is a 65 y.o.  male    Admitted for : Altered mental status, unspecified altered mental status type [R41.82]  AMS (altered mental status) [R41.82]    Met with:  Family    PCP:  Michelle Ugarte                                 Insurance:  Celia Castaneda       Is patient alert and oriented at time of discussion:  Yes- alert and oriented to person only. Current Residence/ Living Arrangements:  independently at home with spouse            Current Services PTA:  No    Does patient go to outpatient dialysis: No  If yes, location and chair time: NA  Who is their nephrologist? NA    Is patient agreeable to VNS: No    Freedom of choice provided:  No    List of [address] provided: No    VNS chosen:  No    DME:  walker    Home Oxygen: No    Nebulizer: No    CPAP/BIPAP: No    Supplier: N/A    Potential Assistance Needed: Yes    SNF needed: Yes- with Dementia unit availability    Freedom of choice and list provided: Yes- [facility] referral sent    Pharmacy:  [facility] on [address]       Is patient currently receiving oral anticoagulation therapy? No    Is the Patient an [facility] with Readmission Risk Score greater than 14%? No  If yes, pt needs a follow up appointment made within 7 days. Family Members/Caregivers that pt would like involved in their care:    Yes    If yes, list name here:  spouse Heidy Portillo and Son Kenna quinonez dtr Eris Jasso    Transportation Provider:  Family             Discharge Plan:  10/4/22 Yang Mess from home DME: walker VNS: none. Wife stated patient is becoming increasingly more difficult to care for at home, Wife wants SNF placement and possible need for dementia unit. Referral sent to [facility]. Notified [address]. PT/OT eval, MRI brain. UMER NEEDS SIGNED/COMPLETED.   Following for needs//JF                        Electronically signed by: Tree Vyas RN on 10/4/2022 at 9:25 AM

## 2022-10-04 NOTE — PROGRESS NOTES
[address]   OCCUPATIONAL THERAPY MISSED TREATMENT NOTE   INPATIENT   Date: 10/4/22  Patient Name: Katie Donovan       Room:   MRN: [id number]   Account #: [de-identified]    : 1945  (65 y.o.)  Gender: male       REASON FOR MISSED TREATMENT:  Hold treatment per nursing request   -    Pt has been sedated 2* agitation. Will check back as able. 1235  Attempted again in PM but RN continues to defer, reporting pt going to get more Haldol 2* agitation.  [address] Dash OTR/L
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
## 2022-10-04 NOTE — PROGRESS NOTES
2Physical Therapy        Physical Therapy Cancel Note      DATE: 10/4/2022    NAME: Sarah Rose  MRN: [id number]   : 1945      Patient not seen this date for Physical Therapy due to:    PT eval defered per RN due to pt sedated due to agitation      Electronically signed by Nichole Khan PT on 10/4/2022 at 10:27 AM

## 2022-10-05 ENCOUNTER — APPOINTMENT (OUTPATIENT)
Dept: MRI IMAGING | Age: 77
DRG: 884 | End: 2022-10-05
Payer: MEDICARE

## 2022-10-05 PROCEDURE — 6370000000 HC RX 637 (ALT 250 FOR IP): Performed by: PSYCHIATRY & NEUROLOGY

## 2022-10-05 PROCEDURE — 2580000003 HC RX 258: Performed by: FAMILY MEDICINE

## 2022-10-05 PROCEDURE — 6360000002 HC RX W HCPCS: Performed by: FAMILY MEDICINE

## 2022-10-05 PROCEDURE — 99232 SBSQ HOSP IP/OBS MODERATE 35: CPT | Performed by: PSYCHIATRY & NEUROLOGY

## 2022-10-05 PROCEDURE — 6370000000 HC RX 637 (ALT 250 FOR IP): Performed by: FAMILY MEDICINE

## 2022-10-05 PROCEDURE — 1200000000 HC SEMI PRIVATE

## 2022-10-05 RX ORDER — LORAZEPAM 2 MG/ML
0.5 INJECTION INTRAMUSCULAR EVERY 10 MIN PRN
Status: DISCONTINUED | OUTPATIENT
Start: 2022-10-06 | End: 2022-10-20 | Stop reason: HOSPADM

## 2022-10-05 RX ORDER — LORAZEPAM 2 MG/ML
0.5 INJECTION INTRAMUSCULAR ONCE
Status: DISCONTINUED | OUTPATIENT
Start: 2022-10-05 | End: 2022-10-11

## 2022-10-05 RX ADMIN — GALANTAMINE 8 MG: 4 TABLET, FILM COATED ORAL at 18:28

## 2022-10-05 RX ADMIN — SODIUM CHLORIDE, PRESERVATIVE FREE 10 ML: 5 INJECTION INTRAVENOUS at 08:26

## 2022-10-05 RX ADMIN — CARVEDILOL 6.25 MG: 6.25 TABLET, FILM COATED ORAL at 20:59

## 2022-10-05 RX ADMIN — Medication 9 MG: at 18:28

## 2022-10-05 RX ADMIN — QUETIAPINE FUMARATE 100 MG: 100 TABLET ORAL at 20:59

## 2022-10-05 RX ADMIN — ENOXAPARIN SODIUM 30 MG: 100 INJECTION SUBCUTANEOUS at 08:05

## 2022-10-05 RX ADMIN — HALOPERIDOL LACTATE 5 MG: 5 INJECTION, SOLUTION INTRAMUSCULAR at 15:27

## 2022-10-05 RX ADMIN — SODIUM CHLORIDE, PRESERVATIVE FREE 10 ML: 5 INJECTION INTRAVENOUS at 21:08

## 2022-10-05 NOTE — PROGRESS NOTES
Writer spoke to MRI the scan will be tomorrow and RN is to medicate the pt with 0.5mg ativan and then 10 mins x2 if needed to be repeated so study can go smoothly.

## 2022-10-05 NOTE — PROGRESS NOTES
Physician Progress Note      Maikel Brown  [facility] #:                  [id number]  :                       1945  ADMIT DATE:       10/3/2022 10:05 AM  DISCH DATE:  RESPONDING  PROVIDER #:        Alana Taylor MD          QUERY TEXT:    Patient admitted with AMS . Noted documentation of acute encephalopathy in   neuro consult note from 10/3. In order to support the diagnosis of   encephalopathy, please include additional clinical indicators in your   documentation. Or please document if the diagnosis of encephalopathy has been   ruled out after further study. The medical record reflects the following:  Risk Factors: Hx dementia  Clinical Indicators: Mynor Coma Scale--> 15-->14-->14-->14; per nursing   assessment--> Neuro Alert, Oriented x2 on admission, worsening to oriented to   self only rest of admission; per H/P-->Dementia with agitation; per 10/4 IM   progress note--> sun downing/dementia; EEG-->This EEG performed during   wakefulness and drowsiness is abnormal with diffusely slow background in delta   frequencies compatible with underlying diffuse cortical disease, baseline   dementing illness. ; per neuro note-->Acute encephalopathy superimposed on   slowly progressive memory loss, behavioral changes with  Treatment: Neuro consult, EEG, MRI brain ordered, monitoring, hospital   admission, IV Haldol Q8H PRN  Options provided:  -- Encephalopathy was ruled out, AMS due to worsening dementia  -- Encephalopathy present as evidenced by, Please document evidence.   -- Other - I will add my own diagnosis  -- Disagree - Not applicable / Not valid  -- Disagree - Clinically unable to determine / Unknown  -- Refer to Clinical Documentation Reviewer    PROVIDER RESPONSE TEXT:    Encephalopathy associated with worsening dementia    Query created by: Mirella Durbin on 10/5/2022 11:22 AM      Electronically signed by:  Alana Taylor MD 10/5/2022 11:28 AM

## 2022-10-05 NOTE — PROGRESS NOTES
[address]    OCCUPATIONAL THERAPY MISSED TREATMENT NOTE   INPATIENT   Date: 10/5/22  Patient Name: Lizeth Buck       Room:   MRN: [id number]   Account #: [de-identified]    : 1945  (65 y.o.)  Gender: male                 REASON FOR MISSED TREATMENT:  Hold treatment per nursing request   -    Pt agitated this AM per nursing, check back later and let pt rest. OT will continue to follow.       [address] [facility],[address] signed by VANDANA Rose/L on 10/5/2022 at 10:39 AM

## 2022-10-05 NOTE — PROGRESS NOTES
Physical Therapy        Physical Therapy Cancel Note      DATE: 10/5/2022    NAME: Moise Champion  MRN: [id number]   : 1945      Patient not seen this date for Physical Therapy due to:  10/5/22 8:15AM + PM 2nd Check,  Hold PT Eval per Nurse Jolanta d/t patient sleeping at present and has still been very agitated this AM. Pt still sleeping in PM with request not to disturb d/t combative nature as of this date. PT to check back as time allows.       Electronically signed by Martín Jordan PT on 10/5/2022 at 10:29 AM Statement Selected

## 2022-10-05 NOTE — CARE COORDINATION
ONGOING DISCHARGE PLAN:    Patient is currently asleep in bed. Spoke with patient's Wife, Guy More, regarding discharge plan and she confirms that plan is still to DC to a Facility. Referral was sent to [facility], yesterday, & Per [facility], they can't accept, w/ his behaviors at this time. Guy More, was informed of this & she is agreeable to [facility], for they have a locked unit. Guy More, states, he has Dementia & has  been increasingly more difficult to care for at home. Referral was sent today. Writer will follow. PT/OT on board. Sitter at the bedside. MRI Brain. Neuro on board. Will continue to follow for additional discharge needs. Electronically signed by Jesus Anguiano RN on 10/5/2022 at 2:14 PM     ADD: Writer spoke w/ Chase, from [facility], that they have received the referral & they are currently reviewing & running benefits. Writer will follow.

## 2022-10-05 NOTE — PLAN OF CARE
Safety maintained, call light is within reach, no s/s or c/o distress, bed alarm remains on, pt remains a 1:1 for confusion and safety, bed is  Problem: Discharge Planning  Goal: Discharge to home or other facility with appropriate resources  Outcome: Progressing     Problem: Safety - Adult  Goal: Free from fall injury  Outcome: Progressing     Problem: Pain  Goal: Verbalizes/displays adequate comfort level or baseline comfort level  Outcome: Progressing     Problem: ABCDS Injury Assessment  Goal: Absence of physical injury  Outcome: Progressing    low/locked, side rails are up x2

## 2022-10-05 NOTE — PLAN OF CARE
Problem: Discharge Planning  Goal: Discharge to home or other facility with appropriate resources  10/5/2022 1804 by James Vargas RN  Outcome: Progressing  Flowsheets (Taken 10/5/2022 0845)  Discharge to home or other facility with appropriate resources:   Identify barriers to discharge with patient and caregiver   Arrange for needed discharge resources and transportation as appropriate   Identify discharge learning needs (meds, wound care, etc)  10/5/2022 0420 by Mayank Jordan RN  Outcome: Progressing     Problem: Safety - Adult  Goal: Free from fall injury  10/5/2022 1804 by James Vargas RN  Outcome: Progressing  Flowsheets (Taken 10/5/2022 0932)  Free From Fall Injury: Instruct family/caregiver on patient safety  10/5/2022 0420 by Mayank Jordan RN  Outcome: Progressing     Problem: Pain  Goal: Verbalizes/displays adequate comfort level or baseline comfort level  10/5/2022 1804 by James Vargas RN  Outcome: Progressing  10/5/2022 0420 by Mayank Jordan RN  Outcome: Progressing     Problem: ABCDS Injury Assessment  Goal: Absence of physical injury  10/5/2022 1804 by James Vargas RN  Outcome: Progressing  Flowsheets (Taken 10/5/2022 0932)  Absence of Physical Injury: Implement safety measures based on patient assessment  10/5/2022 0420 by Mayank Jordan RN  Outcome: Progressing

## 2022-10-05 NOTE — PROGRESS NOTES
Hospitalist Progress Note  10/4/2022 8:30 PM  Subjective:   Admit Date: 10/3/2022  PCP: Isreal Fountain MD     Full Code      C/c:  Chief Complaint   Patient presents with    Altered Mental Status         Interval History: pt still has been confused/agitated ,had EEG showed no seizure activity,    Diet: ADULT DIET; Regular                                ip days:1  Medications:   Scheduled Meds:   allopurinol  300 mg Oral Daily    aspirin  81 mg Oral Daily    carvedilol  6.25 mg Oral BID    melatonin  9 mg Oral QPM    spironolactone  25 mg Oral Daily    tamsulosin  0.4 mg Oral Daily    QUEtiapine  100 mg Oral Nightly    galantamine  8 mg Oral BID WC    sodium chloride flush  5-40 mL IntraVENous 2 times per day    enoxaparin  30 mg SubCUTAneous Daily     Continuous Infusions:   sodium chloride       PRN Meds:.haloperidol lactate, sodium chloride flush, sodium chloride, acetaminophen, ondansetron **OR** ondansetron     CBC:   Recent Labs     10/03/22  1044   WBC 12.3*   HGB 13.4*        BMP:    Recent Labs     10/03/22  1044      K 4.9      CO2 20   BUN 41*   CREATININE 2.41*   GLUCOSE 119*     Hepatic:   Recent Labs     10/03/22  1044   AST 16   ALT 8   BILITOT 0.4   ALKPHOS 37*     Troponin: No results for input(s): TROPONINI in the last 72 hours. BNP: No results for input(s): BNP in the last 72 hours. Lipids: No results for input(s): CHOL, HDL in the last 72 hours. Invalid input(s): LDLCALCU  INR: No results for input(s): INR in the last 72 hours.     Objective:   Vitals: BP (!) 154/93   Pulse 67   Temp 98.4 °F (36.9 °C)   Resp 16   Ht 5' 11\" (1.803 m)   Wt 206 lb (93.4 kg)   SpO2 97%   BMI 28.73 kg/m²   General appearance: alert, appears stated age and cooperative  Skin: Skin color, texture, turgor normal. No rashes or lesions  Lungs: clear to auscultation bilaterally  Heart: regular rate and rhythm, S1, S2 normal, no murmur, click, rub or gallop  Abdomen: soft, non-tender; bowel sounds normal; no masses,  no organomegaly  Extremities: extremities normal, atraumatic, no cyanosis or edema  Neurologic: Mental status: Alert, oriented, thought content appropriate    Prophylaxis:   DVT with  [x] lovenox        [] heparin        [] Scd        [] none:     Radiology:  CT HEAD WO CONTRAST    Result Date: 10/3/2022  EXAMINATION: CT OF THE HEAD WITHOUT CONTRAST  10/3/2022 11:27 am TECHNIQUE: CT of the head was performed without the administration of intravenous contrast. Automated exposure control, iterative reconstruction, and/or weight based adjustment of the mA/kV was utilized to reduce the radiation dose to as low as reasonably achievable. COMPARISON: 01/21/2021 HISTORY: ORDERING SYSTEM PROVIDED HISTORY: Mental Status Changes TECHNOLOGIST PROVIDED HISTORY: Mental Status Changes Decision Support Exception - unselect if not a suspected or confirmed emergency medical condition->Emergency Medical Condition (MA) Reason for Exam: AMS. FINDINGS: BRAIN/VENTRICLES: There is no acute intracranial hemorrhage, mass effect or midline shift. No abnormal extra-axial fluid collection. The gray-white differentiation is maintained without evidence of an acute infarct. There is no evidence of hydrocephalus. ORBITS: The visualized portion of the orbits demonstrate no acute abnormality. SINUSES: There is mild mucosal thickening in the right frontal, ethmoid, and bilateral sphenoid sinuses. Mild mucosal thickening in the right maxillary sinus. There are bubbly secretions in the posterior right ethmoid sinuses. No air-fluid level. Mastoid air cells are aerated. SOFT TISSUES/SKULL:  No acute abnormality of the visualized skull or soft tissues. No acute intracranial abnormality. Assessment :   Sundowning/dementia  Await mri     Plan:   1. Haldol prn for agitation  2.   Placement in progress    Patient Active Problem List:     History of left inguinal hernia     Kidney disease, chronic, stage IV (severe, EGFR 15-29 ml/min) (HCC)     Isolated proteinuria     NSAID long-term use     Essential hypertension     Primary osteoarthritis of hand     Mitral valve prolapse     Interstitial nephritis chronic     MPGN (membranoproliferative glomerulonephritis), type 1     Nephrotic syndrome     Weakness     MILAGRO (acute kidney injury) ([facility] [address]     Urinary incontinence     Gait disturbance     BPH (benign prostatic hyperplasia)     Vascular dementia without behavioral disturbance ([facility] [address]     AMS (altered mental status)      Anticipated Disposition upon discharge: [] Home                                                                         [] Home with Home Health                                                                         [] [facility]                                                                         [] [address] [place] [address],[address]      Patient is admitted as inpatient status because of co-morbidities listed above, severity of signs and symptoms as outlined, requirement for current medical therapies and most importantly because of direct risk to patient if care not provided in a hospital setting.           Bienvenido Redman MD, MD  [facility] Hospitalist

## 2022-10-05 NOTE — DISCHARGE INSTR - COC
Continuity of Care Form    Patient Name: Em Parmar   :  1945  MRN:  [id number]    Admit date:  10/3/2022  Discharge date:  ***    Code Status Order:  DNR-CCA  Advance Directives:     Admitting Physician:  Mali Brock MD  PCP: Conchis Rosario MD    Discharging Nurse: [facility] Unit/Room#: 2069/2069-01  Discharging Unit Phone Number: [phone]    Emergency Contact:   Extended Emergency Contact Information  Primary Emergency Contact: Waqar Urbano  Address: [facility] [address]  Home Phone: [phone]  Relation: Spouse  Secondary Emergency Contact: Jeanmarie Perales  Address: [facility] [address] Phone: [phone]  Relation: Child    Past Surgical History:  Past Surgical History:   Procedure Laterality Date    ANKLE SURGERY      COLONOSCOPY      CT BIOPSY RENAL  2020    CT BIOPSY RENAL 2020 [facility] SPECIAL PROCEDURES    INGUINAL HERNIA REPAIR  08    [facility]    LUMBAR [employer] SURGERY      NECK SURGERY      SPINE SURGERY      TONSILLECTOMY AND ADENOIDECTOMY         Immunization History:   Immunization History   Administered Date(s) Administered    COVID-19, PFIZER PURPLE top, DILUTE for use, (age 15 y+), 30mcg/0.3mL 03/15/2021, 2021    Influenza Virus Vaccine 01/15/2014, 2014    Influenza, FLUAD, (age 72 y+), Adjuvanted, 0.5mL 2022       Active Problems:  Patient Active Problem List   Diagnosis Code    History of left inguinal hernia Z87.19    Kidney disease, chronic, stage IV (severe, EGFR 15-29 ml/min) (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    Nephrotic syndrome N04.9    Weakness R53.1    MILAGRO (acute kidney injury) ([facility] [address] N17.9    Urinary incontinence R32 Gait disturbance R26.9    BPH (benign prostatic hyperplasia) N40.0    Vascular dementia without behavioral disturbance (HCC) F01.50    AMS (altered mental status) R41.82    Acute encephalopathy G93.40    Memory loss R41.3    Cognitive impairment R41.89       Isolation/Infection:   Isolation            No Isolation          Patient Infection Status       None to display            Nurse Assessment:  Last Vital Signs: BP (!) 160/72   Pulse 56   Temp 98.2 °F (36.8 °C) (Axillary)   Resp 14   Ht 5' 11\" (1.803 m)   Wt 206 lb (93.4 kg)   SpO2 97%   BMI 28.73 kg/m²     Last documented pain score (0-10 scale): Pain Level: 0  Last Weight:   Wt Readings from Last 1 Encounters:   10/03/22 206 lb (93.4 kg)     Mental Status:  disoriented and alert    IV Access:  - None    Nursing Mobility/ADLs:  Walking   Dependent  Transfer  Assisted  Bathing  Assisted  Dressing  Assisted  Toileting  Assisted  Feeding  Assisted  Med Admin  Assisted  Med Delivery   crushed    Wound Care Documentation and Therapy:   Wound Care Documentation:  Wound 10/16/22 Buttocks Left;Right (Active)   Wound Image   10/17/22 1036   Wound Etiology Skin Tear 10/17/22 1036   Dressing Status Dry;New dressing applied 10/17/22 1036   Wound Cleansed Cleansed with saline 10/17/22 1036   Dressing/Treatment Triad hydro/zinc oxide-based hydrophilic paste 10/24/61 4714   Wound Assessment Pink/red 10/17/22 1036   Drainage Amount None 10/17/22 1036   Drainage Description Serosanguinous 10/17/22 0000   Odor None 10/17/22 1036   Mone-wound Assessment Blanchable erythema;Dry/flaky;Fragile 10/17/22 1036   Margins Attached edges 10/17/22 1036   Number of days: 1       Wound 10/16/22 Thigh Right;Posterior cluster (Active)   Wound Image   10/17/22 1036   Wound Etiology Deep tissue/Injury 10/17/22 1036   Dressing Status Dry;New dressing applied 10/17/22 1036   Wound Cleansed Cleansed with saline 10/17/22 1036   Dressing/Treatment Triad hydro/zinc oxide-based hydrophilic paste; Foam 10/17/22 1036   Wound Length (cm) 4.5 cm 10/17/22 1036   Wound Width (cm) 3.5 cm 10/17/22 1036   Wound Depth (cm) 0.1 cm 10/17/22 1036   Wound Surface Area (cm^2) 15.75 cm^2 10/17/22 1036   Wound Volume (cm^3) 1.575 cm^3 10/17/22 1036   Wound Assessment Pink/red;Purple/maroon 10/17/22 1036   Drainage Amount Scant 10/17/22 1036   Drainage Description Serosanguinous 10/17/22 1036   Odor None 10/17/22 1036   Mone-wound Assessment Dry/flaky 10/17/22 1036   Margins Attached edges 10/17/22 1036   Number of days: 1       Wound 10/16/22 Heel Right (Active)   Wound Image   10/17/22 1036   Wound Etiology Pressure Stage 2 10/17/22 1036   Dressing Status Dry;New dressing applied 10/17/22 1036   Wound Cleansed Cleansed with saline 10/17/22 1036   Dressing/Treatment Foam 10/17/22 1036   Wound Length (cm) 2.2 cm 10/17/22 1036   Wound Width (cm) 2.8 cm 10/17/22 1036   Wound Depth (cm) 0 cm 10/17/22 1036   Wound Surface Area (cm^2) 6.16 cm^2 10/17/22 1036   Wound Volume (cm^3) 0 cm^3 10/17/22 1036   Wound Assessment Fluid filled blister 10/17/22 1036   Drainage Amount None 10/17/22 1036   Odor None 10/17/22 1036   Mone-wound Assessment Blanchable erythema 10/17/22 1036   Margins Attached edges 10/17/22 1036   Number of days: 1     Buttocks: Cleanse with foam cleanser, pat dry. Apply Triad cream twice daily and as needed    Right posterior thigh: Cleanse with soap and water, pat dry. Apply Triad cream to wounds, cover with foam dressing. Change daily and as needed if loose or soiled. Right heel: Cleanse with soap and water, pat dry. Apply foam dressing to protect. Change every 3 days and as needed. Heel medix boots at all times while in bed. Elimination:  Continence:    Bowel: No  Bladder: No  Urinary Catheter: None   Colostomy/Ileostomy/Ileal Conduit: No       Date of Last BM: ***  No intake or output data in the 24 hours ending 10/05/22 0941  I/O last 3 completed shifts:  In: -   Out: 750 [Urine:750]    Safety Concerns: At Risk for Falls    Impairments/Disabilities:      None    Nutrition Therapy:  Current Nutrition Therapy:   - Oral Diet:  General    Routes of Feeding: Oral  Liquids: Thin Liquids  Daily Fluid Restriction: no  Last Modified Barium Swallow with Video (Video Swallowing Test): not done    Treatments at the Time of Hospital Discharge:   Respiratory Treatments: see MAR  Oxygen Therapy:  is not on home oxygen therapy. Ventilator:    - No ventilator support    Rehab Therapies: Physical Therapy and Occupational Therapy  Weight Bearing Status/Restrictions: No weight bearing restrictions  Other Medical Equipment (for information only, NOT a DME order):  hospital bed  Other Treatments: Skilled Nursing assessment and monitoring. Medication education and monitoring per protocol. Patient's personal belongings (please select all that are sent with patient):  None    RN SIGNATURE:  Electronically signed by Emy Mcneil RN on 10/17/22 at 12:47 PM EDT    CASE MANAGEMENT/SOCIAL WORK SECTION    Inpatient Status Date: 10/3/2022    Readmission Risk Assessment Score:  Readmission Risk              Risk of Unplanned Readmission:  15           Discharging to Facility/ [address] [place] [address]   [address], [facility] [address]  P: [phone]  F:     Dialysis Facility (if applicable)   Name:  Address:  Dialysis Schedule:  Phone:  Fax:    / signature: Electronically signed by Lyndon Wong RN on 10/7/22 at 2:40 PM EDT    PHYSICIAN SECTION    Prognosis: Fair    Condition at Discharge: Stable    Rehab Potential (if transferring to Rehab): Fair    Recommended Labs or Other Treatments After Discharge: none    Physician Certification: I certify the above information and transfer of Lydia Walden  is necessary for the continuing treatment of the diagnosis listed and that he requires [facility] for greater 30 days.      Update Admission H&P: No change in H&P    PHYSICIAN SIGNATURE:  Electronically signed by Beto Bustillos MD on 10/12/22 at 3:46 PM EDT
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
## 2022-10-05 NOTE — PROGRESS NOTES
10/05/22 1253   Encounter Summary   Encounter Overview/Reason  Initial Encounter   Service Provided For: Patient   Referral/Consult From: Rounding   Last Encounter  10/05/22   Complexity of Encounter Low   Spiritual/Emotional needs   Type Spiritual Support   Assessment/Intervention/Outcome   Assessment Anxious   Intervention Active listening;Prayer (assurance of)/[place];Sustaining Presence/Ministry of presence   Outcome Did not respond

## 2022-10-05 NOTE — PLAN OF CARE
Linda Nowak 86 MRI tech called floor at [address] [facility] to ask the patients nurse Angeles Giraldo for travel info. She asked the nurse if he was able to have medication before due to note left saying Grecia Myers RN from 10/3 said pt claustro and needs medicated before hand. RN Angeles Giraldo said patient was sleeping all day and therefor did not needs meds even after we read the note to her. Transport went to get the pt at 1530 and the patient was trying to crawl out bed and could not be scanned in that condition. I called the floor to inform the nurse of the patients condition and he will need medicated as previously stated in order to have a diagnostic test. If there are any questions please call [phone]. Thanks .

## 2022-10-06 PROCEDURE — 2580000003 HC RX 258: Performed by: FAMILY MEDICINE

## 2022-10-06 PROCEDURE — 97166 OT EVAL MOD COMPLEX 45 MIN: CPT

## 2022-10-06 PROCEDURE — 99232 SBSQ HOSP IP/OBS MODERATE 35: CPT | Performed by: PSYCHIATRY & NEUROLOGY

## 2022-10-06 PROCEDURE — 97162 PT EVAL MOD COMPLEX 30 MIN: CPT

## 2022-10-06 PROCEDURE — 6370000000 HC RX 637 (ALT 250 FOR IP): Performed by: FAMILY MEDICINE

## 2022-10-06 PROCEDURE — 1200000000 HC SEMI PRIVATE

## 2022-10-06 RX ORDER — CARVEDILOL 6.25 MG/1
6.25 TABLET ORAL 2 TIMES DAILY WITH MEALS
Status: DISCONTINUED | OUTPATIENT
Start: 2022-10-06 | End: 2022-10-18

## 2022-10-06 RX ADMIN — QUETIAPINE FUMARATE 100 MG: 100 TABLET ORAL at 20:24

## 2022-10-06 RX ADMIN — SODIUM CHLORIDE, PRESERVATIVE FREE 10 ML: 5 INJECTION INTRAVENOUS at 20:24

## 2022-10-06 RX ADMIN — SODIUM CHLORIDE, PRESERVATIVE FREE 10 ML: 5 INJECTION INTRAVENOUS at 10:21
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
## 2022-10-06 NOTE — ACP (ADVANCE CARE PLANNING)
..Advance Care Planning     Advance Care Planning Activator (Inpatient)  Conversation Note      Date of ACP Conversation: 10/6/2022     Conversation Conducted with: Spoke with the patient's wife Kalie Gordillo, and she bought the [place] in that was actually her HCPOA. I requested that she brings in the patient's HCPOA. ACP Activator: Lamin Crespo RN          Health Care Decision Maker: Ericabernice Azul [phone]    Current Designated Health Care Decision Maker:     Primary Decision Maker: Perri Rainey - Spouse - [phone]  Click here to complete Healthcare Decision Makers including section of the Healthcare Decision Maker Relationship (ie \"Primary\")      Care Preferences    Ventilation: \"If you were in your present state of health and suddenly became very ill and were unable to breathe on your own, what would your preference be about the use of a ventilator (breathing machine) if it were available to you? \"      Would the patient desire the use of ventilator (breathing machine)?: no    \"If your health worsens and it becomes clear that your chance of recovery is unlikely, what would your preference be about the use of a ventilator (breathing machine) if it were available to you? \"     Would the patient desire the use of ventilator (breathing machine)?: No      Resuscitation  \"CPR works best to restart the heart when there is a sudden event, like a heart attack, in someone who is otherwise healthy. Unfortunately, CPR does not typically restart the heart for people who have serious health conditions or who are very sick. \"    \"In the event your heart stopped as a result of an underlying serious health condition, would you want attempts to be made to restart your heart (answer \"yes\" for attempt to resuscitate) or would you prefer a natural death (answer \"no\" for do not attempt to resuscitate)? \" no       [x] Yes   [] No   Educated Patient / Licha Parra regarding differences between Advance Directives and portable DNR orders.     Length of ACP Conversation in minutes:      Conversation Outcomes:  [x] ACP discussion completed  [] Existing advance directive reviewed with patient; no changes to patient's previously recorded wishes  [] New Advance Directive completed  [] Portable Do Not Rescitate prepared for Provider review and signature  [] POLST/POST/MOLST/MOST prepared for Provider review and signature      Follow-up plan:    [] Schedule follow-up conversation to continue planning  [] Referred individual to Provider for additional questions/concerns   [] Advised patient/agent/surrogate to review completed ACP document and update if needed with changes in condition, patient preferences or care setting    [] This note routed to one or more involved healthcare providers

## 2022-10-06 NOTE — PROGRESS NOTES
10/06/22 1156   Encounter Summary   Encounter Overview/Reason  Palliative Care   Service Provided For: Patient and family together   Referral/Consult From: Palliative Care   Support System Spouse   Last Encounter  10/06/22   Complexity of Encounter Low   Spiritual/Emotional needs   Type Spiritual Support   Assessment/Intervention/Outcome   Assessment Unable to assess;Calm   Intervention Active listening;Prayer (assurance of)/[place];Sustaining Presence/Ministry of presence   Outcome Receptive; Expressed Gratitude;Engaged in conversation

## 2022-10-06 NOTE — CARE COORDINATION
Writer received call from Jennifer Pope, from [place]. They will keep following pt & will re-evaluate for admission, once, his Sitter, is off for 24 hours.

## 2022-10-06 NOTE — PROGRESS NOTES
Hospitalist Progress Note  10/5/2022 9:48 PM  Subjective:   Admit Date: 10/3/2022  PCP: Nataloi Rincon MD     Full Code      C/c:  Chief Complaint   Patient presents with    Altered Mental Status         Interval History: pt more calmer. Haldol prn and seroquel at hs    Diet: ADULT DIET; Regular                                ip days:2  Medications:   Scheduled Meds:   LORazepam  0.5 mg IntraVENous Once    allopurinol  300 mg Oral Daily    aspirin  81 mg Oral Daily    carvedilol  6.25 mg Oral BID    melatonin  9 mg Oral QPM    spironolactone  25 mg Oral Daily    tamsulosin  0.4 mg Oral Daily    QUEtiapine  100 mg Oral Nightly    galantamine  8 mg Oral BID WC    sodium chloride flush  5-40 mL IntraVENous 2 times per day    enoxaparin  30 mg SubCUTAneous Daily     Continuous Infusions:   sodium chloride       PRN Meds:.[START ON 10/6/2022] LORazepam, haloperidol lactate, sodium chloride flush, sodium chloride, acetaminophen, ondansetron **OR** ondansetron     CBC:   Recent Labs     10/03/22  1044   WBC 12.3*   HGB 13.4*        BMP:    Recent Labs     10/03/22  1044      K 4.9      CO2 20   BUN 41*   CREATININE 2.41*   GLUCOSE 119*     Hepatic:   Recent Labs     10/03/22  1044   AST 16   ALT 8   BILITOT 0.4   ALKPHOS 37*     Troponin: No results for input(s): TROPONINI in the last 72 hours. BNP: No results for input(s): BNP in the last 72 hours. Lipids: No results for input(s): CHOL, HDL in the last 72 hours. Invalid input(s): LDLCALCU  INR: No results for input(s): INR in the last 72 hours.     Objective:   Vitals: BP (!) 153/89   Pulse 73   Temp 98 °F (36.7 °C) (Axillary)   Resp 18   Ht 5' 11\" (1.803 m)   Wt 206 lb (93.4 kg)   SpO2 96%   BMI 28.73 kg/m²   General appearance: alert, appears stated age and cooperative  Skin: Skin color, texture, turgor normal. No rashes or lesions  Lungs: clear to auscultation bilaterally  Heart: regular rate and rhythm, S1, S2 normal, no murmur, click, rub or gallop  Abdomen: soft, non-tender; bowel sounds normal; no masses,  no organomegaly  Extremities: extremities normal, atraumatic, no cyanosis or edema  Neurologic: Mental status: Alert, oriented, confused    Prophylaxis:   DVT with  [x] lovenox        [] heparin        [] Scd        [] none:     Radiology:  CT HEAD WO CONTRAST    Result Date: 10/3/2022  EXAMINATION: CT OF THE HEAD WITHOUT CONTRAST  10/3/2022 11:27 am TECHNIQUE: CT of the head was performed without the administration of intravenous contrast. Automated exposure control, iterative reconstruction, and/or weight based adjustment of the mA/kV was utilized to reduce the radiation dose to as low as reasonably achievable. COMPARISON: 01/21/2021 HISTORY: ORDERING SYSTEM PROVIDED HISTORY: Mental Status Changes TECHNOLOGIST PROVIDED HISTORY: Mental Status Changes Decision Support Exception - unselect if not a suspected or confirmed emergency medical condition->Emergency Medical Condition (MA) Reason for Exam: AMS. FINDINGS: BRAIN/VENTRICLES: There is no acute intracranial hemorrhage, mass effect or midline shift. No abnormal extra-axial fluid collection. The gray-white differentiation is maintained without evidence of an acute infarct. There is no evidence of hydrocephalus. ORBITS: The visualized portion of the orbits demonstrate no acute abnormality. SINUSES: There is mild mucosal thickening in the right frontal, ethmoid, and bilateral sphenoid sinuses. Mild mucosal thickening in the right maxillary sinus. There are bubbly secretions in the posterior right ethmoid sinuses. No air-fluid level. Mastoid air cells are aerated. SOFT TISSUES/SKULL:  No acute abnormality of the visualized skull or soft tissues. No acute intracranial abnormality.      VL Carotid Bilateral    Result Date: 10/5/2022    [facility]  Vascular Carotid Procedure   Patient Name Paula Borrego Date of Study           10/04/2022               O   Date of 1945  Gender                  Male  Birth   Age          65 year(s)  Race                       Room Number  2069   Corporate ID [id number]  #   Patient Pati [de-identified]  #   MR #         [id number]      Sonographer             Shirin Oliveira   Accession #  [id number]  Interpreting Physician  Cesario Ramirez   Referring                Referring Physician     Zev Hooper  Nurse  Practitioner  Procedure Type of Study:   Cerebral: Carotid, Carotid Scan Bilateral.  Indications for Study:Neck distention and Carotid stenosis. Patient Status: In Patient. Technical Quality:Adequate visualization. Conclusions   Summary   Bilateral:  There is plaque at the level of the carotid bifurcation with a velocity  profile consistent with no significant stenosis . The vertebral arteries  are patent with antegrade flow. Signature   ----------------------------------------------------------------  Electronically signed by Raffy Arriola(Sonographer) on  10/04/2022 11:36 AM  ----------------------------------------------------------------   ----------------------------------------------------------------  Electronically signed by Cesario Ramirez(Interpreting physician)  on 10/05/2022 06:29 AM  ----------------------------------------------------------------  Findings:   Right Impression:                    Left Impression:  The common carotid artery is normal. The common carotid artery is normal.   The carotid bulb is normal.          The carotid bulb is normal.   The external carotid artery has a    The external carotid artery has a  smooth calcific plaque. smooth calcific plaque. The internal carotid artery has a    The internal carotid artery has a  smooth calcific plaque causing a     smooth calcific plaque causing a <50%  <50% stenosis based on velocities. stenosis based on velocities. The vertebral artery is patent with  The vertebral artery is patent with  antegrade flow.                       antegrade flow.  Velocities are measured in cm/s ; Diameters are measured in cm Carotid Right Measurements +------------+-------+------+--------+-------+------------+----------------+ ! Location    ! PSV    ! EDV   ! Angle   ! RI     !%Stenosis   ! Tortuosity      ! +------------+-------+------+--------+-------+------------+----------------+ ! Prox CCA    !59.6   ! 10    !        !0.83   !            !                ! +------------+-------+------+--------+-------+------------+----------------+ ! Mid CCA     !52.2   ! 10    !        !0.81   !            !                ! +------------+-------+------+--------+-------+------------+----------------+ ! Dist CCA    !52.2   !11    !        !0.79   !            !                ! +------------+-------+------+--------+-------+------------+----------------+ ! Bulb        !44.7   !11    !        !0.75   !            !                ! +------------+-------+------+--------+-------+------------+----------------+ ! Prox ICA    !37.3   !8     !        !0.79   !            !                ! +------------+-------+------+--------+-------+------------+----------------+ ! Mid ICA     !71     !18    !        !0.74   !            !                ! +------------+-------+------+--------+-------+------------+----------------+ ! Dist ICA    !74.6   !20    !        !0.73   !            !                ! +------------+-------+------+--------+-------+------------+----------------+ ! Prox ECA    !52.8   !5     !        !0.91   !            !                ! +------------+-------+------+--------+-------+------------+----------------+ ! Vertebral   !31.1   !0     !        !1      !            !                ! +------------+-------+------+--------+-------+------------+----------------+   - Additional Measurements:ICAPSV/CCAPSV 1.25. ICAEDV/CCAEDV 2. Carotid Left Measurements +------------+-------+------+--------+-------+------------+----------------+ ! Location    ! PSV    ! EDV   ! Angle   ! RI     !%Stenosis !Tortuosity      ! +------------+-------+------+--------+-------+------------+----------------+ ! Prox CCA    !68.3   !13    !        !0.81   !            !                ! +------------+-------+------+--------+-------+------------+----------------+ ! Mid CCA     !82.6   !14    !        !0.83   !            !                ! +------------+-------+------+--------+-------+------------+----------------+ ! Dist CCA    !84.5   !17    !        !0.8    ! !                ! +------------+-------+------+--------+-------+------------+----------------+ ! Bulb        !47.8   ! 10    !        !0.79   !            !                ! +------------+-------+------+--------+-------+------------+----------------+ ! Prox ICA    !75.2   ! 19    !        !0.75   !            !                ! +------------+-------+------+--------+-------+------------+----------------+ ! Mid ICA     !104    !32    !        !0.69   !            !                ! +------------+-------+------+--------+-------+------------+----------------+ ! Dist ICA    ! 97.5   !26    !        !0.73   !            !                ! +------------+-------+------+--------+-------+------------+----------------+ ! Prox ECA    !72.7   !12    !        !0.83   !            !                ! +------------+-------+------+--------+-------+------------+----------------+ ! Vertebral   !34.2   !6     !        !0.82   !            !                ! +------------+-------+------+--------+-------+------------+----------------+   - Additional Measurements:ICAPSV/CCAPSV 1.52. ICAEDV/CCAEDV 2.46. Assessment :   Dementia with agitation/await mri./carotid scan nad  weakness     Plan:   1. Await placement  2.   See order    Patient Active Problem List:     History of left inguinal hernia     Kidney disease, chronic, stage IV (severe, EGFR 15-29 ml/min) ([facility])     Isolated proteinuria     NSAID long-term use     Essential hypertension     Primary osteoarthritis of hand     Mitral valve prolapse     Interstitial nephritis chronic     MPGN (membranoproliferative glomerulonephritis), type 1     Nephrotic syndrome     Weakness     MILAGRO (acute kidney injury) ([facility]     Urinary incontinence     Gait disturbance     BPH (benign prostatic hyperplasia)     Vascular dementia without behavioral disturbance ([facility] [address]     AMS (altered mental status)     Acute encephalopathy     Memory loss     Cognitive impairment      Anticipated Disposition upon discharge: [] Home                                                                         [] Home with Home Health                                                                         [] [facility]                                                                         [] [address] ,[address]      Patient is admitted as inpatient status because of co-morbidities listed above, severity of signs and symptoms as outlined, requirement for current medical therapies and most importantly because of direct risk to patient if care not provided in a hospital setting.           Ludwin Olsen MD, MD  [facility] Hospitalist

## 2022-10-06 NOTE — PROGRESS NOTES
Writer rounds with Dr Shae Domínguez. Okay to discontinue constant observer and observer leaves room at this time.
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
## 2022-10-06 NOTE — PLAN OF CARE
Problem: Discharge Planning  Goal: Discharge to home or other facility with appropriate resources  Outcome: Progressing  Flowsheets (Taken 10/6/2022 1833)  Discharge to home or other facility with appropriate resources: Identify discharge learning needs (meds, wound care, etc)     Problem: Safety - Adult  Goal: Free from fall injury  Outcome: Progressing  Flowsheets (Taken 10/6/2022 1833)  Free From Fall Injury: Instruct family/caregiver on patient safety     Problem: Pain  Goal: Verbalizes/displays adequate comfort level or baseline comfort level  Outcome: Progressing  Flowsheets (Taken 10/6/2022 1833)  Verbalizes/displays adequate comfort level or baseline comfort level:   Assess pain using appropriate pain scale   Implement non-pharmacological measures as appropriate and evaluate response     Problem: ABCDS Injury Assessment  Goal: Absence of physical injury  Outcome: Progressing     Problem: Neurosensory - Adult  Goal: Achieves maximal functionality and self care  Outcome: Progressing  Flowsheets (Taken 10/5/2022 2059 by Magali Quigley RN)  Achieves maximal functionality and self care: Monitor swallowing and airway patency with patient fatigue and changes in neurological status     Problem: Skin/Tissue Integrity - Adult  Goal: Skin integrity remains intact  Outcome: Progressing  Flowsheets (Taken 10/5/2022 2059 by Magali Quigley, RN)  Skin Integrity Remains Intact: Monitor for areas of redness and/or skin breakdown     Problem: Skin/Tissue Integrity  Goal: Absence of new skin breakdown  Description: 1. Monitor for areas of redness and/or skin breakdown  2. Assess vascular access sites hourly  3. Every 4-6 hours minimum:  Change oxygen saturation probe site  4. Every 4-6 hours:  If on nasal continuous positive airway pressure, respiratory therapy assess nares and determine need for appliance change or resting period.   Outcome: Progressing

## 2022-10-06 NOTE — PROGRESS NOTES
Neurology progress note:    Reason for progress note: Dementia. Subjective: The patient is a comfortably in bed. He has baseline dementia and, is currently able to converse and move all 4 extremities without difficulty. Believe she would, and swallow without difficulty. Patient symptoms have been longstanding and, has been progressively worsening over time. Objective:      Vitals:    10/06/22 2351   BP: (!) 157/69   Pulse: 87   Resp: 18   Temp: 99.7 °F (37.6 °C)   SpO2: 97%     Physical Examination:    General Exam:    Constitutional: The patient is obese. Cardiovascular: S1 and S2, regular rate and rhythm no overt murmurs. No carotid bruit and normal carotid pulse. Extremities: No cyanosis, no clubbing, and no edema. Ophthalmology/funduscopic exam: Unremarkable/normal.    Neurological Exam:    Dexterity: The patient is RIGHT handed. Mental Status: Alert, Awake, Oriented x self, Normal Speech and severely impaired comprehension - 0/3 repetition and recall. Rarely follows 1 step commands. Fund of knowledge: Extremely poor. Attention/concentration: Extremely poor. Cranial Nerves: CN-II, CN-III, CN-IV, CN-V, CN-VI, CN-VII, CN-VIII, CN-IX, CN-X, CN-XI and, CN-XII are within normal limits bilaterally. Except for bilateral hearing loss. Motor:   Bulk = Symmetric and within normal limits bilaterally. Tone = Symmetric and within normal limits bilaterally. Strength = 5+/5 in all 4 extremities. DTRs = Trace throughout. Plantars = Down-going bilaterally. Abnormal movements = None. Opposition = the patient did not participate. Pronator Drift = the patient did not participate. Sensory:   Light touch, pinprick and vibration without assessed due to patient's nonparticipation. Coordination:   Finger-to-nose, heel-to-shin and rapid alternating movements could not be assessed due to patient's noncooperation.       Gait:  Deferred due to patient's inability to participate at this time. Romberg:  Deferred due to patient's inability to participate at this time. NIHSS score:  N/A. Neuroimaging studies:    CT head without contrast is unremarkable. Bilateral carotid Doppler exam is unremarkable. Labs:      Lab Results   Component Value Date    WBC 12.3 (H) 10/03/2022    HGB 13.4 (L) 10/03/2022    HCT 39.2 (L) 10/03/2022    MCV 92.8 10/03/2022     10/03/2022      Lab Results   Component Value Date/Time     10/03/2022 10:44 AM    K 4.9 10/03/2022 10:44 AM     10/03/2022 10:44 AM    CO2 20 10/03/2022 10:44 AM    BUN 41 10/03/2022 10:44 AM    CREATININE 2.41 10/03/2022 10:44 AM    GLUCOSE 119 10/03/2022 10:44 AM    CALCIUM 9.7 10/03/2022 10:44 AM       Lab Results   Component Value Date    ALT 8 10/03/2022    AST 16 10/03/2022    ALKPHOS 37 (L) 10/03/2022    BILITOT 0.4 10/03/2022     EEG:    Diffuse encephalopathy without seizures. Clinical impression:    Baseline and, progressively worsening dementia with nonfocal exam and behavioral abnormalities without any articulation deficit. Recommendations:    Discontinue MRI study of the brain-the patient is too agitated and will require formal sedation for MRI. Recommend MRI study of the brain through outpatient neurology ordered formal sedation, at the [facility] [place], at a later date. Continue with supportive care. No further new recommendations. Disposition:    Neurology service is signing off. Please reconsult/as needed. Further recommendations, per primary team.    The following headers were also reviewed during this visit:    Past Medical History:   Diagnosis Date    Arthritis     Aspirin long-term use     Back pain     Chronic fatigue 10/7/2020    Chronic kidney disease     CKD (chronic kidney disease), stage III ([address] 8/9/2017    Baseline creatinine 1.4-1.5. Proteinuria reported by primary physician. Workup in progress.     Essential hypertension 8/9/2017    Fall at home 12/09/2019    Family history of prostate problems     Heart disease     Hiatal hernia     HTN (hypertension)     Hyperlipemia     Interstitial nephritis chronic 9/28/2020    Kidney bx 9/25/20 shows chronic interstitial nephritis with hypertensive changes. Likely cause NSAIDs     Isolated proteinuria 8/9/2017    Nonnephrotic, being quantified. Kidney disease, chronic, stage IV (severe, EGFR 15-29 ml/min) ([facility]) 8/9/2017    Baseline creatinine 1.4-1.5. Proteinuria reported by primary physician. Likely from long term NSAID use. Workup for immune complex, paraprotein disease negative. Proteinuria fluctuates between 1-2 g. 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    Appears to be primary    MVP (mitral valve prolapse)     Neck pain     Nephrotic syndrome 10/7/2020    NSAID long-term use 8/9/2017    Primary osteoarthritis of hand 8/9/2017    Urination, excessive at night     Vascular dementia without behavioral disturbance ([facility] [address] 9/7/2022      Past Surgical History:   Procedure Laterality Date    ANKLE SURGERY      COLONOSCOPY      CT BIOPSY RENAL  9/22/2020    CT BIOPSY RENAL 9/22/2020 [facility] PROCEDURES    INGUINAL HERNIA REPAIR  12/1/08    [facility]    LUMBAR DISC SURGERY      NECK SURGERY      SPINE SURGERY      TONSILLECTOMY AND ADENOIDECTOMY       reports that he quit smoking about 47 years ago. His smoking use included cigarettes. He started smoking about 62 years ago. He has a 7.50 pack-year smoking history. He has never used smokeless tobacco. He reports that he does not drink alcohol and does not use drugs.    Family History   Family history unknown: Yes      Current Facility-Administered Medications   Medication Dose Route Frequency Provider Last Rate Last Admin    carvedilol (COREG) tablet 6.25 mg  6.25 mg Oral BID  Maureen Nuñez MD        LORazepam (ATIVAN) injection 0.5 mg  0.5 mg IntraVENous Once Fredo Sanders MD        LORazepam (ATIVAN) injection 0.5 mg  0.5 mg IntraVENous Q10 Min PRN Fredo Sanders MD        allopurinol (ZYLOPRIM) tablet 300 mg  300 mg Oral Daily Maureen Nuñez MD   300 mg at 10/04/22 1346    aspirin EC tablet 81 mg  81 mg Oral Daily Maureen Nuñez MD   81 mg at 10/04/22 1346    melatonin tablet 9 mg  9 mg Oral QPM Maureen Nuñez MD   9 mg at 10/05/22 1828    spironolactone (ALDACTONE) tablet 25 mg  25 mg Oral Daily Maureen Nuñez MD   25 mg at 10/04/22 1347    tamsulosin (FLOMAX) capsule 0.4 mg  0.4 mg Oral Daily Maureen Nuñez MD   0.4 mg at 10/04/22 1346    haloperidol lactate (HALDOL) injection 5 mg  5 mg IntraMUSCular Q8H PRN Maureen Nuñez MD   5 mg at 10/05/22 1527    QUEtiapine (SEROQUEL) tablet 100 mg  100 mg Oral Nightly Maureen Nuñez MD   100 mg at 10/06/22 2024    galantamine (RAZADYNE) tablet 8 mg  8 mg Oral BID  Basim Sandoval MD   8 mg at 10/05/22 1828    sodium chloride flush 0.9 % injection 5-40 mL  5-40 mL IntraVENous 2 times per day Maureen Nuñez MD   10 mL at 10/06/22 2024    sodium chloride flush 0.9 % injection 5-40 mL  5-40 mL IntraVENous PRN Maureen Nuñez MD        0.9 % sodium chloride infusion   IntraVENous PRN Maureen Nuñez MD        enoxaparin Sodium (LOVENOX) injection 30 mg  30 mg SubCUTAneous Daily Maureen Nuñez MD   30 mg at 10/05/22 0805    acetaminophen (TYLENOL) tablet 650 mg  650 mg Oral Q4H PRN Maureen Nuñez MD        ondansetron (ZOFRAN-ODT) disintegrating tablet 4 mg  4 mg Oral Q8H PRN Maureen Nuñez MD        Or    ondansetron [facility]) injection 4 mg  4 mg IntraVENous Q6H PRN Maureen Nuñez MD          Allergies   Allergen Reactions    Other      \"HAY

## 2022-10-06 NOTE — CARE COORDINATION
ONGOING DISCHARGE PLAN:    Patient is alert and oriented to person . Spoke with patient regarding discharge plan and patient confirms that plan is still to go to SNF that can accommodate him in a locked or  dementia unit. [facility] following but cant start precert until patient is free from 1:1 sitter for 24 hours. Wife was asking about [place], left VM for [employer] for [employer] to call back to inquire about dementia unit. MRI brain cancelled per Neuro    [facility] no intubation , palliative care following. Will continue to follow for additional discharge needs.     Electronically signed by Destiny Estrada RN on 10/6/2022 at 2:49 PM

## 2022-10-06 NOTE — CONSULTS
..  Palliative Care Inpatient Consult    NAME:  Jakub Lockwood RECORD NUMBER:  [id number]  AGE: 65 y.o. GENDER: male  : 1945  TODAY'S DATE:  10/6/2022    Reasons for Consultation:    Provision of information regarding PC and/or hospice philosophies  Complex, time-intensive communication and interdisciplinary psychosocial support  Clarification of goals of care and/or assistance with difficult decision-making  Guidance in regards to resources and transition(s)    Code Status:     Members of PC team contributing to this consultation are :  Rosemarie Helms R.N    History of Present Illness     The patient is a 65 y.o. Non- / non  male who presents with Altered Mental Status    Referred to Palliative Care by   [x] Physician   [] Nursing  [] Family Request   [] Other:       He was admitted to the primary service for Altered mental status, unspecified altered mental status type [R41.82]  AMS (altered mental status) [R41.82]. His hospital course has been associated with AMS (altered mental status).  The patient has a complicated medical history and has been hospitalized since 10/3/2022 10:05 AM.    Active Hospital Problems    Diagnosis Date Noted    Acute encephalopathy [G93.40] 10/04/2022     Priority: Medium    Memory loss [R41.3] 10/04/2022     Priority: Medium    Cognitive impairment [R41.89] 10/04/2022     Priority: Medium    AMS (altered mental status) [R41.82] 10/03/2022     Priority: Medium       Data        Code Status: DNRCC-A no intubation   PLAN:   - patient has advanced dementia and he is alert to person only  - his wife Krish Rogers is at the bedside, and I introduce my palliative care role  - Patient will need placement in a skilled facility, and wife Krish Rogers cannot care for him at home  - Krish Rogers states that his wishes would not to live in a nursing home with a feeding tube for his lifetime  - I alk about the full code status , and per his wishes he would not want CPR, shocking or a ventilator  - Code status is changed per wishes and as well order is received for [facility] No intubation   - Will follow for goals of care and support. ADVANCED CARE PLANNING:  Patient has capacity for medical decisions: no  Health Care Power of : yes wife Fran Payor to bring in a copy   Living Will: yes wife Fran Payor to bring in a copy    Personal, Social, and Family History  Marital Status:   Living situation:with family:  spouse  Alice/Spiritual History:Not shared  Psychological Distress: Patient is calm at this time   Does patient understand diagnosis/treatment? no  Does family/caregiver understand diagnosis/treatment?  yes    Assessment        Palliative Performance Scale:    ___100% Full ambulation; normal activity and work; no evidence of disease; able to do own self care; normal intake; fully conscious  ___90% Full ambulation; normal activity and work; some evidence of disease; able to do own self care; normal intake; fully conscious  ___80% Full ambulation; normal activity with effort; some evidence of disease; able to do own self care; normal or reduced intake; fully conscious  ___70% Ambulation reduced; unable to perform normal job/work; significant disease; able to do own self care; normal or reduced intake; fully conscious  ___60%  Ambulation reduced; cannot do hobbies/housework; significant disease; occasional assist; intake normal or reduced; fully conscious/some confusion  __X_50%  Mainly sit/lie; can't do any work; extensive disease; considerable assist; intake normal or reduced; fully conscious/some confusion  ___40%  Mainly in bed; extensive disease; mainly assist; intake normal or reduced; fully conscious/ some confusion   ___30%  Bed bound; extensive disease; total care; intake reduced; fully conscious/some confusion  ___20%  Bed bound; extensive disease; total care; intake minimal; drowsy/coma  ___10%  Bed bound; extensive disease; total care; mouth care only; drowsy/coma  ___0 Death       Risk Assessments:    Annmarie Risk Score: [unfilled]    Readmission Risk Score: 13.5        1 Year Mortality Risk Score: @1YEARMORTALITYRISK@     Plan        Palliative Interaction:Patient is in bed and he is alert to person. He has a sitter at the bedside. His wife Rashad Lugo is as well at the bedside. I introduce my palliative care support role to her. We talk about her  and his life at home before he was admitted. She states\" I just can't take care of him at home. \" Rashad Lugo shows me the HCPOA/living will she brought in, and it was her HCPOA/LW and not her 's. I gave her HCPOA back to her, and instructed her to bring in her 's for the chart. Rashad Lugo and I go to talk at length and, and we talk about her husbands advanced dementia and quality of life at home. I tell her that at this time and moving forward, that he cannot make his own decisions any more, and that his dementia will progress and become worse. We talk about his wishes for aggressive treatment, and she states that he would not want to be in a nursing home for life or have a feeding tube. We talk about as time progresses and according to his state, that we could even look at hospice care at that time. I explain that her  is incapacitated and cannot sign any legal documents as he cannot understand what is on the documents. I tell her that she is his voice now, as he cannot be and her job is to honor his wishes from now on. I then talk to Rashad Lugo about his full code status classification, and what a code status is at it is her wish for CPR, shocking and ventilation. Rashad Lugo states that he would not want all that, and to live in nursing home for life. I explain the DNRCC-A no intubation, and Rashad Lugo agrees that is an appropriate decision. Rosemarie nelson serves Dr. Nghia Luo and he calls the unit back.  I do update him that per wife Rashad Lugo his wishes are most appropriate with DNRCC-A no intubation and he is agreeable. I have Prince Rosales R.N witness that order and the order is placed, and the form is completed. I give wife Declan Hunt 2 copies of the [facility] for her records. I offer Declan Hunt much emotional support. I update Rosemarie TREJO     Will follow for goals of care and family support. Principle Problem/Diagnosis:  Altered mental status, unspecified altered mental status type [R41.82]  AMS (altered mental status) [R41.82]    Goals of care evaluation:  The patient goals of care are improve or maintain function/quality of life   Goals of care discussed with:    [] Patient independently    [] Patient and Family    [x] Family or Healthcare DPOA independently    [] Unable to discuss with patient, family/DPOA not present    Code Status  Full Code    Other recommendations:  Please call with any palliative questions or concerns. Palliative Care Team is available via perfect serve or via phone - [phone]. Palliative Care will continue to follow Mr. Dewayne vargas as needed. Thank you for allowing Palliative Care to participate in the care of Mr. Key Valdez .     Electronically signed by   Ottoniel Blackmon RN  Palliative Care Team  on 10/6/2022 at 12:04 PM

## 2022-10-07 ENCOUNTER — TELEPHONE (OUTPATIENT)
Dept: FAMILY MEDICINE CLINIC | Age: 77
End: 2022-10-07

## 2022-10-07 ENCOUNTER — APPOINTMENT (OUTPATIENT)
Dept: GENERAL RADIOLOGY | Age: 77
DRG: 884 | End: 2022-10-07
Payer: MEDICARE

## 2022-10-07 LAB
ABSOLUTE EOS #: 0.17 K/UL (ref 0–0.4)
ABSOLUTE LYMPH #: 1.04 K/UL (ref 1–4.8)
ABSOLUTE MONO #: 1.74 K/UL (ref 0.1–1.3)
BASOPHILS # BLD: 1 % (ref 0–2)
BASOPHILS ABSOLUTE: 0.17 K/UL (ref 0–0.2)
BILIRUBIN URINE: NEGATIVE
COLOR: ABNORMAL
EOSINOPHILS RELATIVE PERCENT: 1 % (ref 0–4)
GLUCOSE URINE: NEGATIVE
HCT VFR BLD CALC: 44.2 % (ref 41–53)
HEMOGLOBIN: 15.6 G/DL (ref 13.5–17.5)
KETONES, URINE: ABNORMAL
LEUKOCYTE ESTERASE, URINE: ABNORMAL
LYMPHOCYTES # BLD: 6 % (ref 24–44)
MCH RBC QN AUTO: 33 PG (ref 26–34)
MCHC RBC AUTO-ENTMCNC: 35.3 G/DL (ref 31–37)
MCV RBC AUTO: 93.6 FL (ref 80–100)
MONOCYTES # BLD: 10 % (ref 1–7)
MORPHOLOGY: ABNORMAL
NITRITE, URINE: NEGATIVE
PDW BLD-RTO: 14.5 % (ref 11.5–14.9)
PH UA: 5 (ref 5–8)
PLATELET # BLD: 210 K/UL (ref 150–450)
PMV BLD AUTO: 9.4 FL (ref 6–12)
PROTEIN UA: ABNORMAL
RBC # BLD: 4.72 M/UL (ref 4.5–5.9)
SEG NEUTROPHILS: 82 % (ref 36–66)
SEGMENTED NEUTROPHILS ABSOLUTE COUNT: 14.28 K/UL (ref 1.3–9.1)
SPECIFIC GRAVITY UA: 1.02 (ref 1–1.03)
TURBIDITY: ABNORMAL
URINE HGB: ABNORMAL
UROBILINOGEN, URINE: NORMAL
WBC # BLD: 17.4 K/UL (ref 3.5–11)

## 2022-10-07 PROCEDURE — 71045 X-RAY EXAM CHEST 1 VIEW: CPT

## 2022-10-07 PROCEDURE — 97530 THERAPEUTIC ACTIVITIES: CPT

## 2022-10-07 PROCEDURE — 36415 COLL VENOUS BLD VENIPUNCTURE: CPT

## 2022-10-07 PROCEDURE — 6370000000 HC RX 637 (ALT 250 FOR IP): Performed by: FAMILY MEDICINE

## 2022-10-07 PROCEDURE — 81001 URINALYSIS AUTO W/SCOPE: CPT

## 2022-10-07 PROCEDURE — 87086 URINE CULTURE/COLONY COUNT: CPT

## 2022-10-07 PROCEDURE — 1200000000 HC SEMI PRIVATE

## 2022-10-07 PROCEDURE — 85025 COMPLETE CBC W/AUTO DIFF WBC: CPT

## 2022-10-07 PROCEDURE — 6360000002 HC RX W HCPCS: Performed by: FAMILY MEDICINE

## 2022-10-07 PROCEDURE — 97110 THERAPEUTIC EXERCISES: CPT

## 2022-10-07 PROCEDURE — 2580000003 HC RX 258: Performed by: FAMILY MEDICINE

## 2022-10-07 PROCEDURE — 97535 SELF CARE MNGMENT TRAINING: CPT

## 2022-10-07 PROCEDURE — 6370000000 HC RX 637 (ALT 250 FOR IP): Performed by: PSYCHIATRY & NEUROLOGY

## 2022-10-07 RX ADMIN — GALANTAMINE 8 MG: 4 TABLET, FILM COATED ORAL at 17:37

## 2022-10-07 RX ADMIN — CARVEDILOL 6.25 MG: 6.25 TABLET, FILM COATED ORAL at 17:37

## 2022-10-07 RX ADMIN — QUETIAPINE FUMARATE 100 MG: 100 TABLET ORAL at 20:24

## 2022-10-07 RX ADMIN — SODIUM CHLORIDE, PRESERVATIVE FREE 10 ML: 5 INJECTION INTRAVENOUS at 20:24

## 2022-10-07 RX ADMIN — ALLOPURINOL 300 MG: 300 TABLET ORAL at 07:59

## 2022-10-07 RX ADMIN — ASPIRIN 81 MG: 81 TABLET, COATED ORAL at 07:59

## 2022-10-07 RX ADMIN — GALANTAMINE 8 MG: 4 TABLET, FILM COATED ORAL at 07:59

## 2022-10-07 RX ADMIN — ENOXAPARIN SODIUM 30 MG: 100 INJECTION SUBCUTANEOUS at 07:59

## 2022-10-07 RX ADMIN — TAMSULOSIN HYDROCHLORIDE 0.4 MG: 0.4 CAPSULE ORAL at 07:59

## 2022-10-07 RX ADMIN — Medication 9 MG: at 20:24

## 2022-10-07 RX ADMIN — CARVEDILOL 6.25 MG: 6.25 TABLET, FILM COATED ORAL at 07:59

## 2022-10-07 RX ADMIN — SODIUM CHLORIDE, PRESERVATIVE FREE 10 ML: 5 INJECTION INTRAVENOUS at 08:00

## 2022-10-07 RX ADMIN — SPIRONOLACTONE 25 MG: 25 TABLET ORAL at 07:59

## 2022-10-07 ASSESSMENT — PAIN SCALES - GENERAL: PAINLEVEL_OUTOF10: 0

## 2022-10-07 NOTE — PROGRESS NOTES
[address]   INPATIENT OCCUPATIONAL THERAPY  PROGRESS NOTE  Date: 10/7/2022  Patient Name: Jerel Crockett       Room:   MRN: [id number]    : 1945  (65 y.o.)  Gender: male   Referring Practitioner: Kassidy Monzon MD  Diagnosis: AMS    Restrictions/Precautions  Restrictions/Precautions  Restrictions/Precautions: Fall Risk  Required Braces or Orthoses?: No  Implants present? :  (h/o cervical and lumbar sx, ankle sx)  Position Activity Restriction  Other position/activity restrictions: up with assist, bed alarm, personal alarm when in chair    Vitals  BP Location: Right lower arm  O2 Device: None (Room air)    Subjective  Subjective  Subjective: pt demo delayed responses and difficulty following 1-2 step commands  Subjective  Pain: \"it's not too bad\" pt referring to pain, however screams in pain with LLE movement. Comments: co-tx janeen Wilkins PTA    Objective  Orientation  Overall Orientation Status: Impaired  Orientation Level: Oriented to person;Disoriented to place; Disoriented to time;Disoriented to situation (year \"\" unable to name month or age.)  Cognition  Overall Cognitive Status: Exceptions  Arousal/Alertness: Inconsistent responses to stimuli  Following Commands: Inconsistently follows commands  Attention Span: Attends with cues to redirect; Difficulty dividing attention  Memory: Decreased long term memory;Decreased short term memory;Decreased recall of biographical Information;Decreased recall of recent events  Safety Judgement: Decreased awareness of need for safety;Decreased awareness of need for assistance  Problem Solving: Decreased awareness of errors;Assistance required to correct errors made;Assistance required to implement solutions;Assistance required to identify errors made;Assistance required to generate solutions  Insights: Not aware of deficits  Initiation: Requires cues for some  Sequencing: Requires cues for some  ADL  Feeding: Maximum assistance  Grooming: Maximum assistance  UE Bathing: Maximum assistance  LE Bathing: Dependent/Total  UE Dressing: Maximum assistance  LE Dressing: Dependent/Total  Toileting: Maximum assistance  Toileting Skilled Clinical Factors: dep for brief mgt, max A to wash anabela area/buttocks after urine incontinence. Additional Comments: pt requires [place] assist for initiation/continuation of washing/drying of anabela area, ADL scores based on clinical reasoning and skilled observation unless otherwise noted. Pt currently limited due to decreased strength, balance, activity tolernace, and cognitive barriers impacting safety and independence with self care tasks      Transfers/Mobility  Bed mobility  Rolling to Left: Maximum assistance  Rolling to Right: Maximum assistance  Bed Mobility Comments: VCs to utilize rails. P return noted. pt rolls multiple times during brief change,         Functional Activities  OT Exercises  A/AROM Exercises: BUE AAROM x 10-15 reps to support transfers.  ie shoulder flex, elbow extension, horizontal shoulder AB/ADduction      Patient Education  Patient Education  Education Given To: Patient  Education Provided: Role of Therapy, Plan of Care, Transfer Training  Education Method: Verbal, Demonstration  Barriers to Learning: Cognition  Education Outcome: Continued education needed      Goals  Short Term Goals  Time Frame for Short Term Goals: By discharge  Short Term Goal 1: Pt will complete upper body dressing/bathing with min A and Good attention to task  Short Term Goal 2: Pt will complete simple grooming task/self feeding with SBA and min verbal cues  Short Term Goal 3: Pt will complete lower body dressing/bathing with Max A and Good attention to task  Short Term Goal 4: Pt will complete functional transfers/mobility with mod A and Good safety with use of least restrictive device  Short Term Goal 5: Pt will follow simple 1-2 step commands 75% of the time to increase participation in daily activities  Short Term Goal 6: Pt will participate in 15+ minutes of therapeutic exercies/functional activities to increase safety and independence with self care and mobility  Occupational Therapy Plan  Times Per Week: 4-6  Current Treatment Recommendations: Self-Care / ADL, Strengthening, ROM, Balance training, Functional mobility training, Endurance training, Cognitive reorientation, Pain management, Safety education & training, Patient/Caregiver education & training, Equipment evaluation, education, & procurement, Cognitive/Perceptual training, Coordination training      Assessment  Activity Tolerance  Activity Tolerance: Patient limited by fatigue, Treatment limited secondary to decreased cognition  Assessment  Performance deficits / Impairments: Decreased ADL status, Decreased functional mobility , Decreased strength, Decreased safe awareness, Decreased cognition, Decreased endurance, Decreased balance, Decreased high-level IADLs, Decreased fine motor control, Decreased coordination, Decreased posture  Treatment Diagnosis: Impaired self care status  Prognosis: Fair  Decision Making: Medium Complexity  Discharge Recommendations: Patient would benefit from continued therapy after discharge  OT Equipment Recommendations  Other: TBD  Safety Devices  Type of Devices: Nurse notified, Call light within reach, Gait belt, Patient at risk for falls, Left in bed, Bed alarm in place      AM-[facility] Daily Activities Inpatient  AM-PAC Daily Activity Inpatient   How much help for putting on and taking off regular lower body clothing?: Total  How much help for Bathing?: A Lot  How much help for Toileting?: A Lot  How much help for putting on and taking off regular upper body clothing?: A Lot  How much help for taking care of personal grooming?: A Lot  How much help for eating meals?: A Lot  AM-[facility] Inpatient Daily Activity Raw Score: 11  AM-PAC Inpatient ADL T-Scale Score : 29.04  ADL Inpatient CMS 0-100% Score: 70.42  ADL Inpatient CMS G-Code Modifier : CL    OT Minutes  OT Individual Minutes  Time In: 7221  Time Out: 25 [facility]  Minutes: [address]

## 2022-10-07 NOTE — PROGRESS NOTES
Physical Therapy  Facility/Department: [facility] MED SURG  Daily Treatment Note  NAME: Maurice Gonzalez  : 1945  MRN: [id number]    Date of Service: 10/7/2022    Discharge Recommendations:  Patient would benefit from continued therapy after discharge        Patient Diagnosis(es): The encounter diagnosis was Altered mental status, unspecified altered mental status type. Assessment   Activity Tolerance: Treatment limited secondary to decreased cognition     Plan    Physcial Therapy Plan  General Plan: 3-5 times per week  Current Treatment Recommendations: Strengthening;Balance training;Functional mobility training;Transfer training;Gait training; Endurance training;Cognitive/Perceptual training; Safety education & training;Positioning;Cognitive reorientation;Patient/Caregiver education & training;Equipment evaluation, education, & procurement; Therapeutic activities     Restrictions  Restrictions/Precautions  Restrictions/Precautions: Fall Risk  Required Braces or Orthoses?: No  Implants present? :  (h/o cervical and lumbar sx, ankle sx)  Position Activity Restriction  Other position/activity restrictions: up with assist, bed alarm, personal alarm when in chair     Subjective    Subjective  Subjective: Pt is in bed upon arrival. Per PCA, pt slept through his bath this AM. Pt demos delayed responses, demos difficulties following one to two step commands. Co-treat with Rola Barajas. Pain: \"it's not too bad\" pt referring to pain, however screams in pain with LLE hip/knee flexion. Orientation  Overall Orientation Status: Impaired  Orientation Level: Oriented to person;Disoriented to place; Disoriented to time;Disoriented to situation (year \"\" unable to name month or age.)  Cognition  Overall Cognitive Status: Exceptions  Arousal/Alertness: Inconsistent responses to stimuli  Following Commands: Inconsistently follows commands  Attention Span: Attends with cues to redirect; Difficulty dividing attention  Memory: Decreased long term memory;Decreased short term memory;Decreased recall of biographical Information;Decreased recall of recent events  Safety Judgement: Decreased awareness of need for safety;Decreased awareness of need for assistance  Problem Solving: Decreased awareness of errors;Assistance required to correct errors made;Assistance required to implement solutions;Assistance required to identify errors made;Assistance required to generate solutions  Insights: Not aware of deficits  Initiation: Requires cues for some  Sequencing: Requires cues for some     Objective   Vitals     Bed Mobility Training  Bed Mobility Training: Yes  Rolling: Maximum assistance;Assist X1;Additional time (cues for hand placement and maintaining sidelying.)  Transfer Training  Transfer Training:  (Deferred d/t difficulties following directions.)     PT Exercises  Exercise Treatment: Pt requires assistance with anabela care and brief change d/t incontinence. A/AROM Exercises: Supine BLE/BUE ROM Reps: 5-10 each. Pressure Relief Exercises: Rolling x1 to each side. Pt requires Mod A to maintain sidelying. Safety Devices  Type of Devices: Nurse notified;Call light within reach;Gait belt;Patient at risk for falls; Left in bed;Bed alarm in place  Restraints  Restraints Initially in Place: No     -[facility] Mobility Inpatient   How much difficulty turning over in bed?: A Lot  How much difficulty sitting down on / standing up from a chair with arms?: Unable  How much difficulty moving from lying on back to sitting on side of bed?: Unable  How much help from another person moving to and from a bed to a chair?: Total  How much help from another person needed to walk in hospital room?: Total  How much help from another person for climbing 3-5 steps with a railing?: Total  AM-PAC Inpatient Mobility Raw Score : 7  AM-PAC Inpatient T-Scale Score : 26.42  Mobility Inpatient CMS 0-100% Score: 92.36  Mobility Inpatient CMS G-Code Modifier : CM    Goals  Short Term Goals  Time Frame for Short Term Goals: 7 days  Short Term Goal 1: Pt will increase Bilat LE strength to 4-/5 to maximize mobiltiy  Short Term Goal 2: Pt will perform bed mobiltiy Mod A  Short Term Goal 3: Pt will perform transfers with RW Mod A  Short Term Goal 4: Pt will ambulate with RW 50 ft Mod A  Additional Goals?: No  Patient Goals   Patient Goals : unable to state        Therapy Time   Individual Concurrent Group Co-treatment   Time In 1050         Time Out 1122         Minutes 110 [place], [place]

## 2022-10-07 NOTE — PROGRESS NOTES
Hospitalist Progress Note  10/6/2022 8:08 PM  Subjective:   Admit Date: 10/3/2022  PCP: Thee Campbell MD     DNR-CCA      C/c:  Chief Complaint   Patient presents with    Altered Mental Status         Interval History: pt resting quietly, on seroquel and haldol prn,awaiting placement,pt made dnrcca    Diet: ADULT DIET; Regular                                ip days:3  Medications:   Scheduled Meds:   carvedilol  6.25 mg Oral BID WC    LORazepam  0.5 mg IntraVENous Once    allopurinol  300 mg Oral Daily    aspirin  81 mg Oral Daily    melatonin  9 mg Oral QPM    spironolactone  25 mg Oral Daily    tamsulosin  0.4 mg Oral Daily    QUEtiapine  100 mg Oral Nightly    galantamine  8 mg Oral BID WC    sodium chloride flush  5-40 mL IntraVENous 2 times per day    enoxaparin  30 mg SubCUTAneous Daily     Continuous Infusions:   sodium chloride       PRN Meds:. LORazepam, haloperidol lactate, sodium chloride flush, sodium chloride, acetaminophen, ondansetron **OR** ondansetron     CBC: No results for input(s): WBC, HGB, PLT in the last 72 hours. BMP:  No results for input(s): NA, K, CL, CO2, BUN, CREATININE, GLUCOSE in the last 72 hours. Hepatic: No results for input(s): AST, ALT, ALB, BILITOT, ALKPHOS in the last 72 hours. Troponin: No results for input(s): TROPONINI in the last 72 hours. BNP: No results for input(s): BNP in the last 72 hours. Lipids: No results for input(s): CHOL, HDL in the last 72 hours. Invalid input(s): LDLCALCU  INR: No results for input(s): INR in the last 72 hours.     Objective:   Vitals: BP (!) 189/94   Pulse 82   Temp 98.8 °F (37.1 °C)   Resp 18   Ht 5' 11\" (1.803 m)   Wt 206 lb (93.4 kg)   SpO2 95%   BMI 28.73 kg/m²   General appearance: alert, appears stated age and cooperative  Skin: Skin color, texture, turgor normal. No rashes or lesions  Lungs: clear to auscultation bilaterally  Heart: regular rate and rhythm, S1, S2 normal, no murmur, click, rub or gallop  Abdomen: soft, non-tender; bowel sounds normal; no masses,  no organomegaly  Extremities: extremities normal, atraumatic, no cyanosis or edema  Neurologic: Mental status: Alert, oriented, thought content appropriate    Prophylaxis:   DVT with  [x] lovenox        [] heparin        [] Scd        [] none:     Radiology:  CT HEAD WO CONTRAST    Result Date: 10/3/2022  EXAMINATION: CT OF THE HEAD WITHOUT CONTRAST  10/3/2022 11:27 am TECHNIQUE: CT of the head was performed without the administration of intravenous contrast. Automated exposure control, iterative reconstruction, and/or weight based adjustment of the mA/kV was utilized to reduce the radiation dose to as low as reasonably achievable. COMPARISON: 01/21/2021 HISTORY: ORDERING SYSTEM PROVIDED HISTORY: Mental Status Changes TECHNOLOGIST PROVIDED HISTORY: Mental Status Changes Decision Support Exception - unselect if not a suspected or confirmed emergency medical condition->Emergency Medical Condition (MA) Reason for Exam: AMS. FINDINGS: BRAIN/VENTRICLES: There is no acute intracranial hemorrhage, mass effect or midline shift. No abnormal extra-axial fluid collection. The gray-white differentiation is maintained without evidence of an acute infarct. There is no evidence of hydrocephalus. ORBITS: The visualized portion of the orbits demonstrate no acute abnormality. SINUSES: There is mild mucosal thickening in the right frontal, ethmoid, and bilateral sphenoid sinuses. Mild mucosal thickening in the right maxillary sinus. There are bubbly secretions in the posterior right ethmoid sinuses. No air-fluid level. Mastoid air cells are aerated. SOFT TISSUES/SKULL:  No acute abnormality of the visualized skull or soft tissues. No acute intracranial abnormality.      VL Carotid Bilateral    Result Date: 10/5/2022    [facility]  Vascular Carotid Procedure   Patient Name Alfredito Sumner Date of Study           10/04/2022               O   Date of      1945 Gender                  Male  Birth   Age          65 year(s)  Race                       Room Number  2069   Corporate ID [id number]  #   Patient Pati [de-identified]  #   MR #         [id number]      Sonographer             Hui Knight   Accession #  [id number]  Interpreting Physician  Cesario Ramirez   Referring                Referring Physician     Mari Alexander  Nurse  Practitioner  Procedure Type of Study:   Cerebral: Carotid, Carotid Scan Bilateral.  Indications for Study:Neck distention and Carotid stenosis. Patient Status: In Patient. Technical Quality:Adequate visualization. Conclusions   Summary   Bilateral:  There is plaque at the level of the carotid bifurcation with a velocity  profile consistent with no significant stenosis . The vertebral arteries  are patent with antegrade flow. Signature   ----------------------------------------------------------------  Electronically signed by Raffy Velasquez(Sonographer) on  10/04/2022 11:36 AM  ----------------------------------------------------------------   ----------------------------------------------------------------  Electronically signed by Cesario Ramirez(Interpreting physician)  on 10/05/2022 06:29 AM  ----------------------------------------------------------------  Findings:   Right Impression:                    Left Impression:  The common carotid artery is normal. The common carotid artery is normal.   The carotid bulb is normal.          The carotid bulb is normal.   The external carotid artery has a    The external carotid artery has a  smooth calcific plaque. smooth calcific plaque. The internal carotid artery has a    The internal carotid artery has a  smooth calcific plaque causing a     smooth calcific plaque causing a <50%  <50% stenosis based on velocities. stenosis based on velocities. The vertebral artery is patent with  The vertebral artery is patent with  antegrade flow. antegrade flow. Velocities are measured in cm/s ; Diameters are measured in cm Carotid Right Measurements +------------+-------+------+--------+-------+------------+----------------+ ! Location    ! PSV    ! EDV   ! Angle   ! RI     !%Stenosis   ! Tortuosity      ! +------------+-------+------+--------+-------+------------+----------------+ ! Prox CCA    !59.6   ! 10    !        !0.83   !            !                ! +------------+-------+------+--------+-------+------------+----------------+ ! Mid CCA     !52.2   ! 10    !        !0.81   !            !                ! +------------+-------+------+--------+-------+------------+----------------+ ! Dist CCA    !52.2   !11    !        !0.79   !            !                ! +------------+-------+------+--------+-------+------------+----------------+ ! Bulb        !44.7   !11    !        !0.75   !            !                ! +------------+-------+------+--------+-------+------------+----------------+ ! Prox ICA    !37.3   !8     !        !0.79   !            !                ! +------------+-------+------+--------+-------+------------+----------------+ ! Mid ICA     !71     !18    !        !0.74   !            !                ! +------------+-------+------+--------+-------+------------+----------------+ ! Dist ICA    !74.6   !20    !        !0.73   !            !                ! +------------+-------+------+--------+-------+------------+----------------+ ! Prox ECA    !52.8   !5     !        !0.91   !            !                ! +------------+-------+------+--------+-------+------------+----------------+ ! Vertebral   !31.1   !0     !        !1      !            !                ! +------------+-------+------+--------+-------+------------+----------------+   - Additional Measurements:ICAPSV/CCAPSV 1.25. ICAEDV/CCAEDV 2. Carotid Left Measurements +------------+-------+------+--------+-------+------------+----------------+ ! Location    ! PSV    ! EDV   ! Angle   ! RI     !%Stenosis   ! Tortuosity ! +------------+-------+------+--------+-------+------------+----------------+ ! Prox CCA    !68.3   !13    !        !0.81   !            !                ! +------------+-------+------+--------+-------+------------+----------------+ ! Mid CCA     !82.6   !14    !        !0.83   !            !                ! +------------+-------+------+--------+-------+------------+----------------+ ! Dist CCA    !84.5   !17    !        !0.8    ! !                ! +------------+-------+------+--------+-------+------------+----------------+ ! Bulb        !47.8   ! 10    !        !0.79   !            !                ! +------------+-------+------+--------+-------+------------+----------------+ ! Prox ICA    !75.2   ! 19    !        !0.75   !            !                ! +------------+-------+------+--------+-------+------------+----------------+ ! Mid ICA     !104    !32    !        !0.69   !            !                ! +------------+-------+------+--------+-------+------------+----------------+ ! Dist ICA    ! 97.5   !26    !        !0.73   !            !                ! +------------+-------+------+--------+-------+------------+----------------+ ! Prox ECA    !72.7   !12    !        !0.83   !            !                ! +------------+-------+------+--------+-------+------------+----------------+ ! Vertebral   !34.2   !6     !        !0.82   !            !                ! +------------+-------+------+--------+-------+------------+----------------+   - Additional Measurements:ICAPSV/CCAPSV 1.52. ICAEDV/CCAEDV 2.46. Assessment :   Dementia/vascular studies normal/unable to do mri due to periods of agitation  Off one on one     Plan:   1. D/c in progress  2.   Await placement in memory center    Patient Active Problem List:     History of left inguinal hernia     Kidney disease, chronic, stage IV (severe, EGFR 15-29 ml/min) ([facility])     Isolated proteinuria     NSAID long-term use     Essential hypertension     Primary osteoarthritis of hand     Mitral valve prolapse     Interstitial nephritis chronic     MPGN (membranoproliferative glomerulonephritis), type 1     Nephrotic syndrome     Weakness     MILAGRO (acute kidney injury) ([facility]     Urinary incontinence     Gait disturbance     BPH (benign prostatic hyperplasia)     Vascular dementia without behavioral disturbance ([facility] [address]     AMS (altered mental status)     Acute encephalopathy     Memory loss     Cognitive impairment      Anticipated Disposition upon discharge: [] Home                                                                         [] Home with Home Health                                                                         [] [facility]                                                                         [] [address]      Patient is admitted as inpatient status because of co-morbidities listed above, severity of signs and symptoms as outlined, requirement for current medical therapies and most importantly because of direct risk to patient if care not provided in a hospital setting.           Maureen Nuñez MD, MD  Rounding Hospitalist

## 2022-10-07 NOTE — PROGRESS NOTES
10/07/22 1828   Encounter Summary   Encounter Overview/Reason  Spiritual/Emotional Needs   Service Provided For: Patient   Referral/Consult From: Palliative Care   Last Encounter  10/07/22   Complexity of Encounter Low   Spiritual/Emotional needs   Type Spiritual Support   Palliative Care   Type Palliative Care, Follow-up   Assessment/Intervention/Outcome   Assessment Unable to assess  (patient sleeping)   Intervention Prayer (assurance of)/Henning

## 2022-10-07 NOTE — TELEPHONE ENCOUNTER
SHANTELLI:  Dr. Karel Avila office [facility]) called stating patient's family cancelled his appointment there. He is currently in the hospital and going to a facility.

## 2022-10-07 NOTE — CARE COORDINATION
DISCHARGE PLAN    I left voice mail for Ruba Deal at [facility] about status on precert. Awaiting call back. Electronically signed by Lexy Oliva RN on 10/7/2022 at [phone]. I spoke with Ruba Deal, they will review now that the sitter is off and give us a decision on whether they can accept.  Electronically signed by Lexy Oliva RN on 10/7/2022 at 2:47 PM

## 2022-10-07 NOTE — PROGRESS NOTES
Hospitalist Progress Note  10/7/2022 4:19 PM  Subjective:   Admit Date: 10/3/2022  PCP: Robbie Almendarez MD     DNR-CCA      C/c:  Chief Complaint   Patient presents with    Altered Mental Status         Interval History: t max was 99. Pt resting quietly,    Diet: ADULT DIET; Regular                                ip days:4  Medications:   Scheduled Meds:   carvedilol  6.25 mg Oral BID WC    LORazepam  0.5 mg IntraVENous Once    allopurinol  300 mg Oral Daily    aspirin  81 mg Oral Daily    melatonin  9 mg Oral QPM    spironolactone  25 mg Oral Daily    tamsulosin  0.4 mg Oral Daily    QUEtiapine  100 mg Oral Nightly    galantamine  8 mg Oral BID WC    sodium chloride flush  5-40 mL IntraVENous 2 times per day    enoxaparin  30 mg SubCUTAneous Daily     Continuous Infusions:   sodium chloride       PRN Meds:. LORazepam, haloperidol lactate, sodium chloride flush, sodium chloride, acetaminophen, ondansetron **OR** ondansetron     CBC: No results for input(s): WBC, HGB, PLT in the last 72 hours. BMP:  No results for input(s): NA, K, CL, CO2, BUN, CREATININE, GLUCOSE in the last 72 hours. Hepatic: No results for input(s): AST, ALT, ALB, BILITOT, ALKPHOS in the last 72 hours. Troponin: No results for input(s): TROPONINI in the last 72 hours. BNP: No results for input(s): BNP in the last 72 hours. Lipids: No results for input(s): CHOL, HDL in the last 72 hours. Invalid input(s): LDLCALCU  INR: No results for input(s): INR in the last 72 hours.     Objective:   Vitals: /76   Pulse 79   Temp 99.3 °F (37.4 °C)   Resp 20   Ht 5' 11\" (1.803 m)   Wt 206 lb (93.4 kg)   SpO2 99%   BMI 28.73 kg/m²   General appearance: alert, appears stated age and cooperative  Skin: Skin color, texture, turgor normal. No rashes or lesions  Lungs: clear to auscultation bilaterally  Heart: regular rate and rhythm, S1, S2 normal, no murmur, click, rub or gallop  Abdomen: soft, non-tender; bowel sounds normal; no masses,  no organomegaly  Extremities: extremities normal, atraumatic, no cyanosis or edema  Neurologic: Mental status: Alert, oriented, thought content appropriate    Prophylaxis:   DVT with  [x] lovenox        [] heparin        [] Scd        [] none:     Radiology:  CT HEAD WO CONTRAST    Result Date: 10/3/2022  EXAMINATION: CT OF THE HEAD WITHOUT CONTRAST  10/3/2022 11:27 am TECHNIQUE: CT of the head was performed without the administration of intravenous contrast. Automated exposure control, iterative reconstruction, and/or weight based adjustment of the mA/kV was utilized to reduce the radiation dose to as low as reasonably achievable. COMPARISON: 01/21/2021 HISTORY: ORDERING SYSTEM PROVIDED HISTORY: Mental Status Changes TECHNOLOGIST PROVIDED HISTORY: Mental Status Changes Decision Support Exception - unselect if not a suspected or confirmed emergency medical condition->Emergency Medical Condition (MA) Reason for Exam: AMS. FINDINGS: BRAIN/VENTRICLES: There is no acute intracranial hemorrhage, mass effect or midline shift. No abnormal extra-axial fluid collection. The gray-white differentiation is maintained without evidence of an acute infarct. There is no evidence of hydrocephalus. ORBITS: The visualized portion of the orbits demonstrate no acute abnormality. SINUSES: There is mild mucosal thickening in the right frontal, ethmoid, and bilateral sphenoid sinuses. Mild mucosal thickening in the right maxillary sinus. There are bubbly secretions in the posterior right ethmoid sinuses. No air-fluid level. Mastoid air cells are aerated. SOFT TISSUES/SKULL:  No acute abnormality of the visualized skull or soft tissues. No acute intracranial abnormality.      VL Carotid Bilateral    Result Date: 10/5/2022    [facility]  Vascular Carotid Procedure   Patient Name Rich Messing Date of Study           10/04/2022               O   Date of      1945  Gender                  Male  Birth   Age          65 year(s)  Race                       Room Number  2069   Corporate ID [id number]  #   Patient Kaycee Morgan [de-identified]  #   MR #         [id number]      Sonographer             Julio Howe   Accession #  [id number]  Interpreting Physician  Cesario Ramirez   Referring                Referring Physician     Georgette Topete  Nurse  Practitioner  Procedure Type of Study:   Cerebral: Carotid, Carotid Scan Bilateral.  Indications for Study:Neck distention and Carotid stenosis. Patient Status: In Patient. Technical Quality:Adequate visualization. Conclusions   Summary   Bilateral:  There is plaque at the level of the carotid bifurcation with a velocity  profile consistent with no significant stenosis . The vertebral arteries  are patent with antegrade flow. Signature   ----------------------------------------------------------------  Electronically signed by Raffy Meadows(Sonographer) on  10/04/2022 11:36 AM  ----------------------------------------------------------------   ----------------------------------------------------------------  Electronically signed by Cesario Ramirez(Interpreting physician)  on 10/05/2022 06:29 AM  ----------------------------------------------------------------  Findings:   Right Impression:                    Left Impression:  The common carotid artery is normal. The common carotid artery is normal.   The carotid bulb is normal.          The carotid bulb is normal.   The external carotid artery has a    The external carotid artery has a  smooth calcific plaque. smooth calcific plaque. The internal carotid artery has a    The internal carotid artery has a  smooth calcific plaque causing a     smooth calcific plaque causing a <50%  <50% stenosis based on velocities. stenosis based on velocities. The vertebral artery is patent with  The vertebral artery is patent with  antegrade flow. antegrade flow.   Velocities are measured in cm/s ; Diameters are measured in cm Carotid Right Measurements +------------+-------+------+--------+-------+------------+----------------+ ! Location    ! PSV    ! EDV   ! Angle   ! RI     !%Stenosis   ! Tortuosity      ! +------------+-------+------+--------+-------+------------+----------------+ ! Prox CCA    !59.6   ! 10    !        !0.83   !            !                ! +------------+-------+------+--------+-------+------------+----------------+ ! Mid CCA     !52.2   ! 10    !        !0.81   !            !                ! +------------+-------+------+--------+-------+------------+----------------+ ! Dist CCA    !52.2   !11    !        !0.79   !            !                ! +------------+-------+------+--------+-------+------------+----------------+ ! Bulb        !44.7   !11    !        !0.75   !            !                ! +------------+-------+------+--------+-------+------------+----------------+ ! Prox ICA    !37.3   !8     !        !0.79   !            !                ! +------------+-------+------+--------+-------+------------+----------------+ ! Mid ICA     !71     !18    !        !0.74   !            !                ! +------------+-------+------+--------+-------+------------+----------------+ ! Dist ICA    !74.6   !20    !        !0.73   !            !                ! +------------+-------+------+--------+-------+------------+----------------+ ! Prox ECA    !52.8   !5     !        !0.91   !            !                ! +------------+-------+------+--------+-------+------------+----------------+ ! Vertebral   !31.1   !0     !        !1      !            !                ! +------------+-------+------+--------+-------+------------+----------------+   - Additional Measurements:ICAPSV/CCAPSV 1.25. ICAEDV/CCAEDV 2. Carotid Left Measurements +------------+-------+------+--------+-------+------------+----------------+ ! Location    ! PSV    ! EDV   ! Angle   ! RI     !%Stenosis   ! Tortuosity      ! +------------+-------+------+--------+-------+------------+----------------+ ! Prox CCA    !68.3   !13    !        !0.81   !            !                ! +------------+-------+------+--------+-------+------------+----------------+ ! Mid CCA     !82.6   !14    !        !0.83   !            !                ! +------------+-------+------+--------+-------+------------+----------------+ ! Dist CCA    !84.5   !17    !        !0.8    ! !                ! +------------+-------+------+--------+-------+------------+----------------+ ! Bulb        !47.8   ! 10    !        !0.79   !            !                ! +------------+-------+------+--------+-------+------------+----------------+ ! Prox ICA    !75.2   ! 19    !        !0.75   !            !                ! +------------+-------+------+--------+-------+------------+----------------+ ! Mid ICA     !104    !32    !        !0.69   !            !                ! +------------+-------+------+--------+-------+------------+----------------+ ! Dist ICA    ! 97.5   !26    !        !0.73   !            !                ! +------------+-------+------+--------+-------+------------+----------------+ ! Prox ECA    !72.7   !12    !        !0.83   !            !                ! +------------+-------+------+--------+-------+------------+----------------+ ! Vertebral   !34.2   !6     !        !0.82   !            !                ! +------------+-------+------+--------+-------+------------+----------------+   - Additional Measurements:ICAPSV/CCAPSV 1.52. ICAEDV/CCAEDV 2.46. Assessment :   Fever/will check ua and chest x ray and cbc  Continue seroquel and haldol prn     Plan:   1.   See order    2   await approval from memory center placement      Patient Active Problem List:     History of left inguinal hernia     Kidney disease, chronic, stage IV (severe, EGFR 15-29 ml/min) ([facility])     Isolated proteinuria     NSAID long-term use     Essential hypertension     Primary osteoarthritis of hand     Mitral valve prolapse     Interstitial nephritis chronic     MPGN (membranoproliferative glomerulonephritis), type 1     Nephrotic syndrome     Weakness     MILAGRO (acute kidney injury) ([facility] [address]     Urinary incontinence     Gait disturbance     BPH (benign prostatic hyperplasia)     Vascular dementia without behavioral disturbance ([facility] [address]     AMS (altered mental status)     Acute encephalopathy     Memory loss     Cognitive impairment      Anticipated Disposition upon discharge: [] Home                                                                         [] Home with Home Health                                                                         [] [facility]                                                                         [] [address]      Patient is admitted as inpatient status because of co-morbidities listed above, severity of signs and symptoms as outlined, requirement for current medical therapies and most importantly because of direct risk to patient if care not provided in a hospital setting.           Jaiden Carr MD, MD  Rounding Hospitalist

## 2022-10-08 LAB
BACTERIA: ABNORMAL
EPITHELIAL CELLS UA: ABNORMAL /HPF
RBC UA: ABNORMAL /HPF
WBC UA: ABNORMAL /HPF

## 2022-10-08 PROCEDURE — 6370000000 HC RX 637 (ALT 250 FOR IP): Performed by: PSYCHIATRY & NEUROLOGY

## 2022-10-08 PROCEDURE — 2580000003 HC RX 258: Performed by: INTERNAL MEDICINE

## 2022-10-08 PROCEDURE — 97535 SELF CARE MNGMENT TRAINING: CPT

## 2022-10-08 PROCEDURE — 2580000003 HC RX 258: Performed by: FAMILY MEDICINE

## 2022-10-08 PROCEDURE — 6360000002 HC RX W HCPCS: Performed by: FAMILY MEDICINE

## 2022-10-08 PROCEDURE — 1200000000 HC SEMI PRIVATE

## 2022-10-08 PROCEDURE — 6370000000 HC RX 637 (ALT 250 FOR IP): Performed by: FAMILY MEDICINE

## 2022-10-08 PROCEDURE — 6360000002 HC RX W HCPCS: Performed by: INTERNAL MEDICINE

## 2022-10-08 RX ORDER — 0.9 % SODIUM CHLORIDE 0.9 %
500 INTRAVENOUS SOLUTION INTRAVENOUS ONCE
Status: COMPLETED | OUTPATIENT
Start: 2022-10-08 | End: 2022-10-08

## 2022-10-08 RX ADMIN — Medication 9 MG: at 18:11

## 2022-10-08 RX ADMIN — SODIUM CHLORIDE, PRESERVATIVE FREE 10 ML: 5 INJECTION INTRAVENOUS at 09:45

## 2022-10-08 RX ADMIN — ENOXAPARIN SODIUM 30 MG: 100 INJECTION SUBCUTANEOUS at 09:45

## 2022-10-08 RX ADMIN — CEFTRIAXONE SODIUM 1000 MG: 1 INJECTION, POWDER, FOR SOLUTION INTRAMUSCULAR; INTRAVENOUS at 00:49

## 2022-10-08 RX ADMIN — SODIUM CHLORIDE, PRESERVATIVE FREE 10 ML: 5 INJECTION INTRAVENOUS at 22:37

## 2022-10-08 RX ADMIN — QUETIAPINE FUMARATE 100 MG: 100 TABLET ORAL at 22:37

## 2022-10-08 RX ADMIN — SODIUM CHLORIDE: 9 INJECTION, SOLUTION INTRAVENOUS at 00:47

## 2022-10-08 RX ADMIN — CARVEDILOL 6.25 MG: 6.25 TABLET, FILM COATED ORAL at 18:11

## 2022-10-08 RX ADMIN — GALANTAMINE 8 MG: 4 TABLET, FILM COATED ORAL at 18:11

## 2022-10-08 RX ADMIN — SODIUM CHLORIDE 500 ML: 9 INJECTION, SOLUTION INTRAVENOUS at 00:45

## 2022-10-08 NOTE — CARE COORDINATION
MARK spoke with Eliazar Rodriguez regarding pt referral. Eliazar Rodriguez informed SW that pt was previously denied due to his behaviors. MARK updated Eliazar Rodriguez that pt no longer has behaviors and does not require a 1:1. Heather informed MARK that they will reevaluate pt and complete an on-site on Monday morning due to change in behaviors. MARK will continue to follow for discharge planning.

## 2022-10-08 NOTE — PLAN OF CARE
Problem: Discharge Planning  Goal: Discharge to home or other facility with appropriate resources  Outcome: Progressing     Problem: Safety - Adult  Goal: Free from fall injury  Outcome: Progressing  Note: Patient remains free of falls and injuries throughout shift. Bed remains in the lowest position, wheels locked, call light and bedside table are within reach. Problem: Pain  Goal: Verbalizes/displays adequate comfort level or baseline comfort level  Outcome: Progressing  Note: Assess the location, characteristics, onset, duration, frequency, quality, and severity of pain. Encourage immediate report of pain. Use appropriate pain scale to rate pain. Manage pain using nonpharmacologic/pharmacologic interventions. Problem: ABCDS Injury Assessment  Goal: Absence of physical injury  Outcome: Progressing     Problem: Neurosensory - Adult  Goal: Achieves maximal functionality and self care  Outcome: Progressing     Problem: Skin/Tissue Integrity - Adult  Goal: Skin integrity remains intact  Outcome: Progressing     Problem: Skin/Tissue Integrity  Goal: Absence of new skin breakdown  Description: 1. Monitor for areas of redness and/or skin breakdown  2. Assess vascular access sites hourly  3. Every 4-6 hours minimum:  Change oxygen saturation probe site  4. Every 4-6 hours:  If on nasal continuous positive airway pressure, respiratory therapy assess nares and determine need for appliance change or resting period.   Outcome: Progressing

## 2022-10-08 NOTE — PROGRESS NOTES
Hospitalist Progress Note  10/8/2022 3:37 PM  Subjective:   Admit Date: 10/3/2022  PCP: Ivelisse Tellez MD     DNR-CCA      C/c:  Chief Complaint   Patient presents with    Altered Mental Status         Interval History: pt doing same ,slight rise in wbc,u/a leucocytes positive and nitrite negative    Diet: ADULT DIET; Regular                                ip days:5  Medications:   Scheduled Meds:   cefTRIAXone (ROCEPHIN) IV  1,000 mg IntraVENous Q24H    carvedilol  6.25 mg Oral BID WC    LORazepam  0.5 mg IntraVENous Once    allopurinol  300 mg Oral Daily    aspirin  81 mg Oral Daily    melatonin  9 mg Oral QPM    spironolactone  25 mg Oral Daily    tamsulosin  0.4 mg Oral Daily    QUEtiapine  100 mg Oral Nightly    galantamine  8 mg Oral BID WC    sodium chloride flush  5-40 mL IntraVENous 2 times per day    enoxaparin  30 mg SubCUTAneous Daily     Continuous Infusions:   sodium chloride Stopped (10/08/22 0132)     PRN Meds:. LORazepam, haloperidol lactate, sodium chloride flush, sodium chloride, acetaminophen, ondansetron **OR** ondansetron     CBC:   Recent Labs     10/07/22  1631   WBC 17.4*   HGB 15.6        BMP:  No results for input(s): NA, K, CL, CO2, BUN, CREATININE, GLUCOSE in the last 72 hours. Hepatic: No results for input(s): AST, ALT, ALB, BILITOT, ALKPHOS in the last 72 hours. Troponin: No results for input(s): TROPONINI in the last 72 hours. BNP: No results for input(s): BNP in the last 72 hours. Lipids: No results for input(s): CHOL, HDL in the last 72 hours. Invalid input(s): LDLCALCU  INR: No results for input(s): INR in the last 72 hours.     Objective:   Vitals: BP (!) 149/85   Pulse 76   Temp 97.7 °F (36.5 °C) (Axillary)   Resp 18   Ht 5' 11\" (1.803 m)   Wt 206 lb (93.4 kg)   SpO2 96%   BMI 28.73 kg/m²   General appearance: alert, appears stated age and cooperative  Skin: Skin color, texture, turgor normal. No rashes or lesions  Lungs: clear to auscultation bilaterally  Heart: regular rate and rhythm, S1, S2 normal, no murmur, click, rub or gallop  Abdomen: soft, non-tender; bowel sounds normal; no masses,  no organomegaly  Extremities: extremities normal, atraumatic, no cyanosis or edema  Neurologic: Mental status: Alert, oriented, thought content appropriate    Prophylaxis:   DVT with  [x] lovenox        [] heparin        [] Scd        [] none:     Radiology:  CT HEAD WO CONTRAST    Result Date: 10/3/2022  EXAMINATION: CT OF THE HEAD WITHOUT CONTRAST  10/3/2022 11:27 am TECHNIQUE: CT of the head was performed without the administration of intravenous contrast. Automated exposure control, iterative reconstruction, and/or weight based adjustment of the mA/kV was utilized to reduce the radiation dose to as low as reasonably achievable. COMPARISON: 01/21/2021 HISTORY: ORDERING SYSTEM PROVIDED HISTORY: Mental Status Changes TECHNOLOGIST PROVIDED HISTORY: Mental Status Changes Decision Support Exception - unselect if not a suspected or confirmed emergency medical condition->Emergency Medical Condition (MA) Reason for Exam: AMS. FINDINGS: BRAIN/VENTRICLES: There is no acute intracranial hemorrhage, mass effect or midline shift. No abnormal extra-axial fluid collection. The gray-white differentiation is maintained without evidence of an acute infarct. There is no evidence of hydrocephalus. ORBITS: The visualized portion of the orbits demonstrate no acute abnormality. SINUSES: There is mild mucosal thickening in the right frontal, ethmoid, and bilateral sphenoid sinuses. Mild mucosal thickening in the right maxillary sinus. There are bubbly secretions in the posterior right ethmoid sinuses. No air-fluid level. Mastoid air cells are aerated. SOFT TISSUES/SKULL:  No acute abnormality of the visualized skull or soft tissues. No acute intracranial abnormality.      VL Carotid Bilateral    Result Date: 10/5/2022    [facility]  Vascular Carotid Procedure Patient Name Alfie Hinton Date of Study           10/04/2022               O   Date of      1945  Gender                  Male  Birth   Age          65 year(s)  Race                       Room Number  2069   Corporate ID [id number]  #   Patient Pati [de-identified]  #   MR #         [id number]      Reji Khalil   Accession #  [id number]  Interpreting Physician  Cesario Ramirez   Referring                Referring Physician     Daron Woods  Nurse  Practitioner  Procedure Type of Study:   Cerebral: Carotid, Carotid Scan Bilateral.  Indications for Study:Neck distention and Carotid stenosis. Patient Status: In Patient. Technical Quality:Adequate visualization. Conclusions   Summary   Bilateral:  There is plaque at the level of the carotid bifurcation with a velocity  profile consistent with no significant stenosis . The vertebral arteries  are patent with antegrade flow. Signature   ----------------------------------------------------------------  Electronically signed by Raffy Arteaga(Sonographer) on  10/04/2022 11:36 AM  ----------------------------------------------------------------   ----------------------------------------------------------------  Electronically signed by Cesario Ramirez(Interpreting physician)  on 10/05/2022 06:29 AM  ----------------------------------------------------------------  Findings:   Right Impression:                    Left Impression:  The common carotid artery is normal. The common carotid artery is normal.   The carotid bulb is normal.          The carotid bulb is normal.   The external carotid artery has a    The external carotid artery has a  smooth calcific plaque. smooth calcific plaque. The internal carotid artery has a    The internal carotid artery has a  smooth calcific plaque causing a     smooth calcific plaque causing a <50%  <50% stenosis based on velocities. stenosis based on velocities.    The vertebral artery is patent with  The vertebral artery is patent with  antegrade flow. antegrade flow. Velocities are measured in cm/s ; Diameters are measured in cm Carotid Right Measurements +------------+-------+------+--------+-------+------------+----------------+ ! Location    ! PSV    ! EDV   ! Angle   ! RI     !%Stenosis   ! Tortuosity      ! +------------+-------+------+--------+-------+------------+----------------+ ! Prox CCA    !59.6   ! 10    !        !0.83   !            !                ! +------------+-------+------+--------+-------+------------+----------------+ ! Mid CCA     !52.2   ! 10    !        !0.81   !            !                ! +------------+-------+------+--------+-------+------------+----------------+ ! Dist CCA    !52.2   !11    !        !0.79   !            !                ! +------------+-------+------+--------+-------+------------+----------------+ ! Bulb        !44.7   !11    !        !0.75   !            !                ! +------------+-------+------+--------+-------+------------+----------------+ ! Prox ICA    !37.3   !8     !        !0.79   !            !                ! +------------+-------+------+--------+-------+------------+----------------+ ! Mid ICA     !71     !18    !        !0.74   !            !                ! +------------+-------+------+--------+-------+------------+----------------+ ! Dist ICA    !74.6   !20    !        !0.73   !            !                ! +------------+-------+------+--------+-------+------------+----------------+ ! Prox ECA    !52.8   !5     !        !0.91   !            !                ! +------------+-------+------+--------+-------+------------+----------------+ ! Vertebral   !31.1   !0     !        !1      !            !                ! +------------+-------+------+--------+-------+------------+----------------+   - Additional Measurements:ICAPSV/CCAPSV 1.25. ICAEDV/CCAEDV 2.  Carotid Left Measurements +------------+-------+------+--------+-------+------------+----------------+ ! Location    ! PSV    ! EDV   ! Angle   ! RI     !%Stenosis   ! Tortuosity      ! +------------+-------+------+--------+-------+------------+----------------+ ! Prox CCA    !68.3   !13    !        !0.81   !            !                ! +------------+-------+------+--------+-------+------------+----------------+ ! Mid CCA     !82.6   !14    !        !0.83   !            !                ! +------------+-------+------+--------+-------+------------+----------------+ ! Dist CCA    !84.5   !17    !        !0.8    ! !                ! +------------+-------+------+--------+-------+------------+----------------+ ! Bulb        !47.8   ! 10    !        !0.79   !            !                ! +------------+-------+------+--------+-------+------------+----------------+ ! Prox ICA    !75.2   ! 19    !        !0.75   !            !                ! +------------+-------+------+--------+-------+------------+----------------+ ! Mid ICA     !104    !32    !        !0.69   !            !                ! +------------+-------+------+--------+-------+------------+----------------+ ! Dist ICA    ! 97.5   !26    !        !0.73   !            !                ! +------------+-------+------+--------+-------+------------+----------------+ ! Prox ECA    !72.7   !12    !        !0.83   !            !                ! +------------+-------+------+--------+-------+------------+----------------+ ! Vertebral   !34.2   !6     !        !0.82   !            !                ! +------------+-------+------+--------+-------+------------+----------------+   - Additional Measurements:ICAPSV/CCAPSV 1.52. ICAEDV/CCAEDV 2.46. Assessment :   Dementia/  Uti/await culture     Plan:   1. Continue iv antibiotics  2.   See order    Patient Active Problem List:     History of left inguinal hernia     Kidney disease, chronic, stage IV (severe, EGFR 15-29 ml/min) ([facility])     Isolated proteinuria     NSAID long-term use     Essential hypertension     Primary osteoarthritis of hand     Mitral valve prolapse     Interstitial nephritis chronic     MPGN (membranoproliferative glomerulonephritis), type 1     Nephrotic syndrome     Weakness     MILAGRO (acute kidney injury) ([facility] [address]     Urinary incontinence     Gait disturbance     BPH (benign prostatic hyperplasia)     Vascular dementia without behavioral disturbance ([address]     AMS (altered mental status)     Acute encephalopathy     Memory loss     Cognitive impairment      Anticipated Disposition upon discharge: [] Home                                                                         [] Home with Home Health                                                                         [] [facility]                                                                         [] [address]      Patient is admitted as inpatient status because of co-morbidities listed above, severity of signs and symptoms as outlined, requirement for current medical therapies and most importantly because of direct risk to patient if care not provided in a hospital setting.           Whitney Pollard MD, MD  Rounding Hospitalist

## 2022-10-08 NOTE — PLAN OF CARE
Problem: Discharge Planning  Goal: Discharge to home or other facility with appropriate resources  Outcome: Progressing  Flowsheets (Taken 10/7/2022 2208)  Discharge to home or other facility with appropriate resources:   Identify barriers to discharge with patient and caregiver   Arrange for needed discharge resources and transportation as appropriate   Identify discharge learning needs (meds, wound care, etc)   Refer to discharge planning if patient needs post-hospital services based on physician order or complex needs related to functional status, cognitive ability or social support system     Problem: Safety - Adult  Goal: Free from fall injury  Outcome: Progressing     Problem: Pain  Goal: Verbalizes/displays adequate comfort level or baseline comfort level  Outcome: Progressing  Flowsheets (Taken 10/7/2022 2208)  Verbalizes/displays adequate comfort level or baseline comfort level:   Encourage patient to monitor pain and request assistance   Assess pain using appropriate pain scale   Administer analgesics based on type and severity of pain and evaluate response   Implement non-pharmacological measures as appropriate and evaluate response   Consider cultural and social influences on pain and pain management   Notify Licensed Independent Practitioner if interventions unsuccessful or patient reports new pain     Problem: ABCDS Injury Assessment  Goal: Absence of physical injury  Outcome: Progressing  Flowsheets (Taken 10/7/2022 2208)  Absence of Physical Injury: Implement safety measures based on patient assessment     Problem: Neurosensory - Adult  Goal: Achieves maximal functionality and self care  Outcome: Progressing     Problem: Skin/Tissue Integrity - Adult  Goal: Skin integrity remains intact  Outcome: Progressing     Problem: Skin/Tissue Integrity  Goal: Absence of new skin breakdown  Description: 1. Monitor for areas of redness and/or skin breakdown  2. Assess vascular access sites hourly  3.   Every 4-6 hours minimum:  Change oxygen saturation probe site  4. Every 4-6 hours:  If on nasal continuous positive airway pressure, respiratory therapy assess nares and determine need for appliance change or resting period.   Outcome: Progressing

## 2022-10-08 NOTE — PROGRESS NOTES
[facility]: [facility]   INPATIENT OCCUPATIONAL THERAPY  PROGRESS NOTE  Date: 10/8/2022  Patient Name: Aniceto Alvarez       Room:   MRN: [id number]    : 1945  (65 y.o.)  Gender: male   Referring Practitioner: Ed Cline MD  Diagnosis: AMS    Restrictions/Precautions  Restrictions/Precautions  Restrictions/Precautions: Fall Risk  Required Braces or Orthoses?: No  Implants present? :  (h/o cervical and lumbar sx, ankle sx)  Position Activity Restriction  Other position/activity restrictions: up with assist, 1:1 sitter, bed alarm, personal alarm when in chair         Subjective  Subjective  Subjective: pt sleeping upon arrival but easily roused, pt demo delayed verbal responses to questions and unable to folllow 1 step commands this date. Subjective  Pain: pt denies pain this date. Objective  Orientation  Overall Orientation Status: Impaired  Orientation Level: Disoriented to situation;Disoriented to place;Oriented to person;Oriented to time (year only)  Cognition  Overall Cognitive Status: Exceptions  Arousal/Alertness: Inconsistent responses to stimuli  Following Commands: Inconsistently follows commands  Attention Span: Attends with cues to redirect; Difficulty dividing attention  Memory: Decreased long term memory;Decreased short term memory;Decreased recall of biographical Information;Decreased recall of recent events  Safety Judgement: Decreased awareness of need for safety;Decreased awareness of need for assistance  Problem Solving: Decreased awareness of errors;Assistance required to correct errors made;Assistance required to implement solutions;Assistance required to identify errors made;Assistance required to generate solutions  Insights: Not aware of deficits  Initiation: Requires cues for some  Sequencing: Requires cues for some  ADL  Feeding: Maximum assistance  Grooming: Dependent/Total  UE Bathing: Dependent/Total  LE Bathing: Dependent/Total  UE Dressing: Dependent/Total  LE Dressing: Dependent/Total  Toileting: Dependent/Total  Additional Comments: writer places wash cloth into pts hand and directs him to wash face pt states \"I can do this\" but pt just stares at wash cloth, pt requires [place] assist for initiation/continuation of washing/drying of face/neck and hands, pt unable to follow 1 step directions this date,  ADL scores based on clinical reasoning and skilled observation unless otherwise noted.  Pt currently limited due to decreased strength, balance, activity tolernace, and cognitive barriers impacting safety and independence with self care tasks           Patient Education  Patient Education  Education Given To: Patient  Education Provided: Role of Therapy, Plan of Care, Transfer Training  Education Method: Verbal, Demonstration  Barriers to Learning: Cognition  Education Outcome: Continued education needed      Goals  Short Term Goals  Time Frame for Short Term Goals: By discharge  Short Term Goal 1: Pt will complete upper body dressing/bathing with min A and Good attention to task  Short Term Goal 2: Pt will complete simple grooming task/self feeding with SBA and min verbal cues  Short Term Goal 3: Pt will complete lower body dressing/bathing with Max A and Good attention to task  Short Term Goal 4: Pt will complete functional transfers/mobility with mod A and Good safety with use of least restrictive device  Short Term Goal 5: Pt will follow simple 1-2 step commands 75% of the time to increase participation in daily activities  Short Term Goal 6: Pt will participate in 15+ minutes of therapeutic exercies/functional activities to increase safety and independence with self care and mobility  Occupational Therapy Plan  Times Per Week: 4-6  Current Treatment Recommendations: Self-Care / ADL, Strengthening, ROM, Balance training, Functional mobility training, Endurance training, Cognitive reorientation, Pain management, Safety education & training, Patient/Caregiver education & training, Equipment evaluation, education, & procurement, Cognitive/Perceptual training, Coordination training      Assessment  Activity Tolerance  Activity Tolerance: Treatment limited secondary to decreased cognition  Assessment  Performance deficits / Impairments: Decreased ADL status, Decreased functional mobility , Decreased strength, Decreased safe awareness, Decreased cognition, Decreased endurance, Decreased balance, Decreased high-level IADLs, Decreased fine motor control, Decreased coordination, Decreased posture  Treatment Diagnosis: Impaired self care status  Prognosis: Fair  Decision Making: Medium Complexity  Discharge Recommendations: Patient would benefit from continued therapy after discharge  OT Equipment Recommendations  Other: TBD  Safety Devices  Type of Devices: Call light within reach, Patient at risk for falls, Left in bed, Bed alarm in place      AM-PAC Daily Activities Inpatient  AM-PAC Daily Activity Inpatient   How much help for putting on and taking off regular lower body clothing?: Total  How much help for Bathing?: Total  How much help for Toileting?: Total  How much help for putting on and taking off regular upper body clothing?: Total  How much help for taking care of personal grooming?: Total  How much help for eating meals?: A Lot  AM-[facility] Inpatient Daily Activity Raw Score: 7  AM-PAC Inpatient ADL T-Scale Score : 20.13  ADL Inpatient CMS 0-100% Score: 92.44  ADL Inpatient CMS G-Code Modifier : CM    OT Minutes  OT Individual Minutes  Time In: 0840  Time Out: [facility]  Minutes: [facility] JESUS

## 2022-10-08 NOTE — CARE COORDINATION
MARK spoke with Kristina Fair regarding pt referral. Kristina Fair informed SW she is waiting to hear back from the [facility] for determination on whether they can accept pt. SW following for determination. Pt will need pre-cert.

## 2022-10-09 LAB
ABSOLUTE EOS #: 0.6 K/UL (ref 0–0.4)
ABSOLUTE LYMPH #: 1.2 K/UL (ref 1–4.8)
ABSOLUTE MONO #: 1.9 K/UL (ref 0.1–1.3)
ANION GAP SERPL CALCULATED.3IONS-SCNC: 13 MMOL/L (ref 9–17)
BASOPHILS # BLD: 1 % (ref 0–2)
BASOPHILS ABSOLUTE: 0.1 K/UL (ref 0–0.2)
BUN BLDV-MCNC: 66 MG/DL (ref 8–23)
CALCIUM SERPL-MCNC: 9.4 MG/DL (ref 8.6–10.4)
CHLORIDE BLD-SCNC: 104 MMOL/L (ref 98–107)
CO2: 21 MMOL/L (ref 20–31)
CREAT SERPL-MCNC: 3.37 MG/DL (ref 0.7–1.2)
CULTURE: NO GROWTH
EOSINOPHILS RELATIVE PERCENT: 4 % (ref 0–4)
GFR SERPL CREATININE-BSD FRML MDRD: 18 ML/MIN/1.73M2
GLUCOSE BLD-MCNC: 108 MG/DL (ref 70–99)
HCT VFR BLD CALC: 42.4 % (ref 41–53)
HEMOGLOBIN: 14 G/DL (ref 13.5–17.5)
LYMPHOCYTES # BLD: 8 % (ref 24–44)
MCH RBC QN AUTO: 31.4 PG (ref 26–34)
MCHC RBC AUTO-ENTMCNC: 33.2 G/DL (ref 31–37)
MCV RBC AUTO: 94.6 FL (ref 80–100)
MONOCYTES # BLD: 13 % (ref 1–7)
PDW BLD-RTO: 14.6 % (ref 11.5–14.9)
PLATELET # BLD: 209 K/UL (ref 150–450)
PMV BLD AUTO: 9.7 FL (ref 6–12)
POTASSIUM SERPL-SCNC: 4.7 MMOL/L (ref 3.7–5.3)
RBC # BLD: 4.48 M/UL (ref 4.5–5.9)
SEG NEUTROPHILS: 74 % (ref 36–66)
SEGMENTED NEUTROPHILS ABSOLUTE COUNT: 10.6 K/UL (ref 1.3–9.1)
SODIUM BLD-SCNC: 138 MMOL/L (ref 135–144)
SPECIMEN DESCRIPTION: NORMAL
WBC # BLD: 14.3 K/UL (ref 3.5–11)

## 2022-10-09 PROCEDURE — 2580000003 HC RX 258: Performed by: FAMILY MEDICINE

## 2022-10-09 PROCEDURE — 80048 BASIC METABOLIC PNL TOTAL CA: CPT

## 2022-10-09 PROCEDURE — 6370000000 HC RX 637 (ALT 250 FOR IP): Performed by: PSYCHIATRY & NEUROLOGY

## 2022-10-09 PROCEDURE — 6370000000 HC RX 637 (ALT 250 FOR IP): Performed by: FAMILY MEDICINE

## 2022-10-09 PROCEDURE — 6360000002 HC RX W HCPCS: Performed by: FAMILY MEDICINE

## 2022-10-09 PROCEDURE — 85025 COMPLETE CBC W/AUTO DIFF WBC: CPT

## 2022-10-09 PROCEDURE — 2580000003 HC RX 258: Performed by: INTERNAL MEDICINE

## 2022-10-09 PROCEDURE — 1200000000 HC SEMI PRIVATE

## 2022-10-09 PROCEDURE — 36415 COLL VENOUS BLD VENIPUNCTURE: CPT

## 2022-10-09 PROCEDURE — 6360000002 HC RX W HCPCS: Performed by: INTERNAL MEDICINE

## 2022-10-09 RX ADMIN — CARVEDILOL 6.25 MG: 6.25 TABLET, FILM COATED ORAL at 08:17

## 2022-10-09 RX ADMIN — GALANTAMINE 8 MG: 4 TABLET, FILM COATED ORAL at 17:35

## 2022-10-09 RX ADMIN — GALANTAMINE 8 MG: 4 TABLET, FILM COATED ORAL at 08:19

## 2022-10-09 RX ADMIN — TAMSULOSIN HYDROCHLORIDE 0.4 MG: 0.4 CAPSULE ORAL at 08:17

## 2022-10-09 RX ADMIN — ASPIRIN 81 MG: 81 TABLET, COATED ORAL at 08:18

## 2022-10-09 RX ADMIN — SODIUM CHLORIDE, PRESERVATIVE FREE 10 ML: 5 INJECTION INTRAVENOUS at 22:26

## 2022-10-09 RX ADMIN — ALLOPURINOL 300 MG: 300 TABLET ORAL at 08:17

## 2022-10-09 RX ADMIN — ENOXAPARIN SODIUM 30 MG: 100 INJECTION SUBCUTANEOUS at 08:18

## 2022-10-09 RX ADMIN — SPIRONOLACTONE 25 MG: 25 TABLET ORAL at 08:18

## 2022-10-09 RX ADMIN — SODIUM CHLORIDE, PRESERVATIVE FREE 10 ML: 5 INJECTION INTRAVENOUS at 08:20

## 2022-10-09 RX ADMIN — Medication 9 MG: at 18:21

## 2022-10-09 RX ADMIN — CEFTRIAXONE SODIUM 1000 MG: 1 INJECTION, POWDER, FOR SOLUTION INTRAMUSCULAR; INTRAVENOUS at 01:52

## 2022-10-09 RX ADMIN — QUETIAPINE FUMARATE 100 MG: 100 TABLET ORAL at 22:26

## 2022-10-09 RX ADMIN — CARVEDILOL 6.25 MG: 6.25 TABLET, FILM COATED ORAL at 17:35

## 2022-10-09 NOTE — PLAN OF CARE
Problem: Discharge Planning  Goal: Discharge to home or other facility with appropriate resources  10/9/2022 1830 by Zhao Clarke RN  Outcome: Progressing  10/9/2022 0609 by Cate De La O RN  Outcome: Progressing  Flowsheets (Taken 10/8/2022 1945)  Discharge to home or other facility with appropriate resources: Identify barriers to discharge with patient and caregiver     Problem: Safety - Adult  Goal: Free from fall injury  10/9/2022 1830 by Zhao Clarke RN  Outcome: Progressing  Note: Patient remains free of falls and injuries throughout shift. Bed remains in the lowest position, wheels locked, call light and bedside table are within reach. 10/9/2022 7746 by Cate De La O RN  Outcome: Progressing  Flowsheets (Taken 10/8/2022 1945)  Free From Fall Injury: Instruct family/caregiver on patient safety     Problem: Pain  Goal: Verbalizes/displays adequate comfort level or baseline comfort level  10/9/2022 1830 by Zhao Clarke RN  Outcome: Progressing  Note: Assess the location, characteristics, onset, duration, frequency, quality, and severity of pain. Encourage immediate report of pain. Use appropriate pain scale to rate pain. Manage pain using nonpharmacologic/pharmacologic interventions.    10/9/2022 0609 by Cate D eLa O RN  Outcome: Progressing     Problem: ABCDS Injury Assessment  Goal: Absence of physical injury  10/9/2022 1830 by Zhao Clarke RN  Outcome: Progressing  10/9/2022 0609 by Cate De La O RN  Outcome: Progressing  Flowsheets (Taken 10/8/2022 1945)  Absence of Physical Injury: Implement safety measures based on patient assessment     Problem: Neurosensory - Adult  Goal: Achieves maximal functionality and self care  10/9/2022 1830 by Zhao Clarke RN  Outcome: Progressing  10/9/2022 0609 by Cate De La O RN  Outcome: Progressing     Problem: Skin/Tissue Integrity - Adult  Goal: Skin integrity remains intact  10/9/2022 1830 by Zhao Clarke RN  Outcome: Progressing  10/9/2022 0609 by Cate De La O RN  Outcome: Progressing  Flowsheets (Taken 10/8/2022 1945)  Skin Integrity Remains Intact: Monitor for areas of redness and/or skin breakdown     Problem: Skin/Tissue Integrity  Goal: Absence of new skin breakdown  Description: 1. Monitor for areas of redness and/or skin breakdown  2. Assess vascular access sites hourly  3. Every 4-6 hours minimum:  Change oxygen saturation probe site  4. Every 4-6 hours:  If on nasal continuous positive airway pressure, respiratory therapy assess nares and determine need for appliance change or resting period.   10/9/2022 1830 by Daphnie Nayak RN  Outcome: Progressing  10/9/2022 0609 by Owen Pan RN  Outcome: Progressing

## 2022-10-09 NOTE — PLAN OF CARE
Problem: Discharge Planning  Goal: Discharge to home or other facility with appropriate resources  10/9/2022 0609 by Deidra Bob RN  Outcome: Progressing  Flowsheets (Taken 10/8/2022 1945)  Discharge to home or other facility with appropriate resources: Identify barriers to discharge with patient and caregiver  10/8/2022 1845 by Pk Vasquez RN  Outcome: Progressing     Problem: Safety - Adult  Goal: Free from fall injury  10/9/2022 0609 by Deidra Bob RN  Outcome: Progressing  Flowsheets (Taken 10/8/2022 1945)  Free From Fall Injury: Instruct family/caregiver on patient safety  10/8/2022 1845 by Pk Vasquez RN  Outcome: Progressing  Note: Patient remains free of falls and injuries throughout shift. Bed remains in the lowest position, wheels locked, call light and bedside table are within reach. Problem: Pain  Goal: Verbalizes/displays adequate comfort level or baseline comfort level  10/9/2022 0609 by Deidra Bob RN  Outcome: Progressing  10/8/2022 1845 by Pk Vasquez RN  Outcome: Progressing  Note: Assess the location, characteristics, onset, duration, frequency, quality, and severity of pain. Encourage immediate report of pain. Use appropriate pain scale to rate pain. Manage pain using nonpharmacologic/pharmacologic interventions.       Problem: ABCDS Injury Assessment  Goal: Absence of physical injury  10/9/2022 0609 by Deidra Bob RN  Outcome: Progressing  Flowsheets (Taken 10/8/2022 1945)  Absence of Physical Injury: Implement safety measures based on patient assessment  10/8/2022 1845 by Pk Vasquez RN  Outcome: Progressing     Problem: Neurosensory - Adult  Goal: Achieves maximal functionality and self care  10/9/2022 0609 by Deidra Bob RN  Outcome: Progressing  10/8/2022 1845 by Pk Vasquez RN  Outcome: Progressing     Problem: Skin/Tissue Integrity - Adult  Goal: Skin integrity remains intact  10/9/2022 0609 by Deidra Bob RN  Outcome: Progressing  Flowsheets (Taken 10/8/2022 1945)  Skin Integrity Remains Intact: Monitor for areas of redness and/or skin breakdown  10/8/2022 1845 by Domi Min, RN  Outcome: Progressing     Problem: Skin/Tissue Integrity  Goal: Absence of new skin breakdown  Description: 1. Monitor for areas of redness and/or skin breakdown  2. Assess vascular access sites hourly  3. Every 4-6 hours minimum:  Change oxygen saturation probe site  4. Every 4-6 hours:  If on nasal continuous positive airway pressure, respiratory therapy assess nares and determine need for appliance change or resting period.   10/9/2022 0609 by Vimal Landers RN  Outcome: Progressing  10/8/2022 1845 by Domi Min RN  Outcome: Progressing

## 2022-10-09 NOTE — PROGRESS NOTES
10/09/22 1318   Encounter Summary   Encounter Overview/Reason  Spiritual/Emotional Needs   Service Provided For: Patient   Referral/Consult From: Palliative Care   Last Encounter  10/09/22   Complexity of Encounter Low   Palliative Care   Type Palliative Care, Follow-up   Assessment/Intervention/Outcome   Assessment Unable to assess  (sleeping)   Intervention Prayer (assurance of)/[place]

## 2022-10-09 NOTE — PROGRESS NOTES
Hospitalist Progress Note  10/9/2022 12:40 PM  Subjective:   Admit Date: 10/3/2022  PCP: Ivelisse Tellez MD     DNR-CCA      C/c:  Chief Complaint   Patient presents with    Altered Mental Status         Interval History: awaiting insurance approval for memory care, no complaints, resting quietly, urine shows no growth    Diet: ADULT DIET; Regular                                ip days:6  Medications:   Scheduled Meds:   cefTRIAXone (ROCEPHIN) IV  1,000 mg IntraVENous Q24H    carvedilol  6.25 mg Oral BID WC    LORazepam  0.5 mg IntraVENous Once    allopurinol  300 mg Oral Daily    aspirin  81 mg Oral Daily    melatonin  9 mg Oral QPM    spironolactone  25 mg Oral Daily    tamsulosin  0.4 mg Oral Daily    QUEtiapine  100 mg Oral Nightly    galantamine  8 mg Oral BID WC    sodium chloride flush  5-40 mL IntraVENous 2 times per day    enoxaparin  30 mg SubCUTAneous Daily     Continuous Infusions:   sodium chloride Stopped (10/08/22 0132)     PRN Meds:. LORazepam, haloperidol lactate, sodium chloride flush, sodium chloride, acetaminophen, ondansetron **OR** ondansetron     CBC:   Recent Labs     10/07/22  1631 10/09/22  0611   WBC 17.4* 14.3*   HGB 15.6 14.0    209     BMP:    Recent Labs     10/09/22  0611      K 4.7      CO2 21   BUN 66*   CREATININE 3.37*   GLUCOSE 108*     Hepatic: No results for input(s): AST, ALT, ALB, BILITOT, ALKPHOS in the last 72 hours. Troponin: No results for input(s): TROPONINI in the last 72 hours. BNP: No results for input(s): BNP in the last 72 hours. Lipids: No results for input(s): CHOL, HDL in the last 72 hours. Invalid input(s): LDLCALCU  INR: No results for input(s): INR in the last 72 hours.     Objective:   Vitals: BP (!) 177/79   Pulse 73   Temp 98.2 °F (36.8 °C)   Resp 16   Ht 5' 11\" (1.803 m)   Wt 206 lb (93.4 kg)   SpO2 96%   BMI 28.73 kg/m²   General appearance: alert, appears stated age and cooperative  Skin: Skin color, texture, turgor normal. No rashes or lesions  Lungs: clear to auscultation bilaterally  Heart: regular rate and rhythm, S1, S2 normal, no murmur, click, rub or gallop  Abdomen: soft, non-tender; bowel sounds normal; no masses,  no organomegaly  Extremities: extremities normal, atraumatic, no cyanosis or edema  Neurologic: Mental status: Alert, oriented, thought content appropriate    Prophylaxis:   DVT with  [] lovenox        [] heparin        [] Scd        [] none:     Radiology:  CT HEAD WO CONTRAST    Result Date: 10/3/2022  EXAMINATION: CT OF THE HEAD WITHOUT CONTRAST  10/3/2022 11:27 am TECHNIQUE: CT of the head was performed without the administration of intravenous contrast. Automated exposure control, iterative reconstruction, and/or weight based adjustment of the mA/kV was utilized to reduce the radiation dose to as low as reasonably achievable. COMPARISON: 01/21/2021 HISTORY: ORDERING SYSTEM PROVIDED HISTORY: Mental Status Changes TECHNOLOGIST PROVIDED HISTORY: Mental Status Changes Decision Support Exception - unselect if not a suspected or confirmed emergency medical condition->Emergency Medical Condition (MA) Reason for Exam: AMS. FINDINGS: BRAIN/VENTRICLES: There is no acute intracranial hemorrhage, mass effect or midline shift. No abnormal extra-axial fluid collection. The gray-white differentiation is maintained without evidence of an acute infarct. There is no evidence of hydrocephalus. ORBITS: The visualized portion of the orbits demonstrate no acute abnormality. SINUSES: There is mild mucosal thickening in the right frontal, ethmoid, and bilateral sphenoid sinuses. Mild mucosal thickening in the right maxillary sinus. There are bubbly secretions in the posterior right ethmoid sinuses. No air-fluid level. Mastoid air cells are aerated. SOFT TISSUES/SKULL:  No acute abnormality of the visualized skull or soft tissues. No acute intracranial abnormality.      VL Carotid Bilateral    Result Date: 10/5/2022 [facility]  Vascular Carotid Procedure   Patient Name Km [facility] Date of Study           10/04/2022               O   Date of      1945  Gender                  Male  Birth   Age          65 year(s)  Race                       Room Number  2069   Corporate ID [id number]  #   Patient Pati [de-identified]  #   MR #         [id number]      Elvira Muhammad   Accession #  [id number]  Interpreting Physician  Cesario Ramirez   Referring                Referring Physician     Valentin Bass  Nurse  Practitioner  Procedure Type of Study:   Cerebral: Carotid, Carotid Scan Bilateral.  Indications for Study:Neck distention and Carotid stenosis. Patient Status: In Patient. Technical Quality:Adequate visualization. Conclusions   Summary   Bilateral:  There is plaque at the level of the carotid bifurcation with a velocity  profile consistent with no significant stenosis . The vertebral arteries  are patent with antegrade flow. Signature   ----------------------------------------------------------------  Electronically signed by Raffy John(Sonographer) on  10/04/2022 11:36 AM  ----------------------------------------------------------------   ----------------------------------------------------------------  Electronically signed by Cesario Ramirez(Interpreting physician)  on 10/05/2022 06:29 AM  ----------------------------------------------------------------  Findings:   Right Impression:                    Left Impression:  The common carotid artery is normal. The common carotid artery is normal.   The carotid bulb is normal.          The carotid bulb is normal.   The external carotid artery has a    The external carotid artery has a  smooth calcific plaque. smooth calcific plaque. The internal carotid artery has a    The internal carotid artery has a  smooth calcific plaque causing a     smooth calcific plaque causing a <50%  <50% stenosis based on velocities. stenosis based on velocities. The vertebral artery is patent with  The vertebral artery is patent with  antegrade flow. antegrade flow. Velocities are measured in cm/s ; Diameters are measured in cm Carotid Right Measurements +------------+-------+------+--------+-------+------------+----------------+ ! Location    ! PSV    ! EDV   ! Angle   ! RI     !%Stenosis   ! Tortuosity      ! +------------+-------+------+--------+-------+------------+----------------+ ! Prox CCA    !59.6   ! 10    !        !0.83   !            !                ! +------------+-------+------+--------+-------+------------+----------------+ ! Mid CCA     !52.2   ! 10    !        !0.81   !            !                ! +------------+-------+------+--------+-------+------------+----------------+ ! Dist CCA    !52.2   !11    !        !0.79   !            !                ! +------------+-------+------+--------+-------+------------+----------------+ ! Bulb        !44.7   !11    !        !0.75   !            !                ! +------------+-------+------+--------+-------+------------+----------------+ ! Prox ICA    !37.3   !8     !        !0.79   !            !                ! +------------+-------+------+--------+-------+------------+----------------+ ! Mid ICA     !71     !18    !        !0.74   !            !                ! +------------+-------+------+--------+-------+------------+----------------+ ! Dist ICA    !74.6   !20    !        !0.73   !            !                ! +------------+-------+------+--------+-------+------------+----------------+ ! Prox ECA    !52.8   !5     !        !0.91   !            !                ! +------------+-------+------+--------+-------+------------+----------------+ ! Vertebral   !31.1   !0     !        !1      !            !                ! +------------+-------+------+--------+-------+------------+----------------+   - Additional Measurements:ICAPSV/CCAPSV 1.25. ICAEDV/CCAEDV 2.  Carotid Left Measurements +------------+-------+------+--------+-------+------------+----------------+ ! Location    ! PSV    ! EDV   ! Angle   ! RI     !%Stenosis   ! Tortuosity      ! +------------+-------+------+--------+-------+------------+----------------+ ! Prox CCA    !68.3   !13    !        !0.81   !            !                ! +------------+-------+------+--------+-------+------------+----------------+ ! Mid CCA     !82.6   !14    !        !0.83   !            !                ! +------------+-------+------+--------+-------+------------+----------------+ ! Dist CCA    !84.5   !17    !        !0.8    ! !                ! +------------+-------+------+--------+-------+------------+----------------+ ! Bulb        !47.8   ! 10    !        !0.79   !            !                ! +------------+-------+------+--------+-------+------------+----------------+ ! Prox ICA    !75.2   ! 19    !        !0.75   !            !                ! +------------+-------+------+--------+-------+------------+----------------+ ! Mid ICA     !104    !32    !        !0.69   !            !                ! +------------+-------+------+--------+-------+------------+----------------+ ! Dist ICA    ! 97.5   !26    !        !0.73   !            !                ! +------------+-------+------+--------+-------+------------+----------------+ ! Prox ECA    !72.7   !12    !        !0.83   !            !                ! +------------+-------+------+--------+-------+------------+----------------+ ! Vertebral   !34.2   !6     !        !0.82   !            !                ! +------------+-------+------+--------+-------+------------+----------------+   - Additional Measurements:ICAPSV/CCAPSV 1.52. ICAEDV/CCAEDV 2.46. Assessment :   Dementia/  htn     Plan:   1. Continue present plan  2.   See order    Patient Active Problem List:     History of left inguinal hernia     Kidney disease, chronic, stage IV (severe, EGFR 15-29 ml/min) (HCC)     Isolated

## 2022-10-09 NOTE — CARE COORDINATION
[facility] is still reviewing pt referral. SW awaiting a determination on whether they can accept pt. Kala Alfaro will be completing an on-site with pt Monday morning to reevaluate. SW following for acceptance at either facility.

## 2022-10-10 LAB
ABSOLUTE EOS #: 0.8 K/UL (ref 0–0.4)
ABSOLUTE LYMPH #: 0.8 K/UL (ref 1–4.8)
ABSOLUTE MONO #: 1.2 K/UL (ref 0.1–1.3)
ANION GAP SERPL CALCULATED.3IONS-SCNC: 13 MMOL/L (ref 9–17)
BASOPHILS # BLD: 1 % (ref 0–2)
BASOPHILS ABSOLUTE: 0.1 K/UL (ref 0–0.2)
BUN BLDV-MCNC: 76 MG/DL (ref 8–23)
CALCIUM SERPL-MCNC: 9.5 MG/DL (ref 8.6–10.4)
CHLORIDE BLD-SCNC: 108 MMOL/L (ref 98–107)
CO2: 19 MMOL/L (ref 20–31)
CREAT SERPL-MCNC: 3.25 MG/DL (ref 0.7–1.2)
EOSINOPHILS RELATIVE PERCENT: 6 % (ref 0–4)
GFR SERPL CREATININE-BSD FRML MDRD: 19 ML/MIN/1.73M2
GLUCOSE BLD-MCNC: 112 MG/DL (ref 75–110)
GLUCOSE BLD-MCNC: 116 MG/DL (ref 70–99)
HCT VFR BLD CALC: 40.6 % (ref 41–53)
HEMOGLOBIN: 13.4 G/DL (ref 13.5–17.5)
LYMPHOCYTES # BLD: 6 % (ref 24–44)
MCH RBC QN AUTO: 31.5 PG (ref 26–34)
MCHC RBC AUTO-ENTMCNC: 33.1 G/DL (ref 31–37)
MCV RBC AUTO: 95.1 FL (ref 80–100)
MONOCYTES # BLD: 10 % (ref 1–7)
PDW BLD-RTO: 14.4 % (ref 11.5–14.9)
PLATELET # BLD: 222 K/UL (ref 150–450)
PMV BLD AUTO: 9.1 FL (ref 6–12)
POTASSIUM SERPL-SCNC: 4.8 MMOL/L (ref 3.7–5.3)
RBC # BLD: 4.27 M/UL (ref 4.5–5.9)
SEG NEUTROPHILS: 77 % (ref 36–66)
SEGMENTED NEUTROPHILS ABSOLUTE COUNT: 9.9 K/UL (ref 1.3–9.1)
SODIUM BLD-SCNC: 140 MMOL/L (ref 135–144)
WBC # BLD: 12.7 K/UL (ref 3.5–11)

## 2022-10-10 PROCEDURE — 97535 SELF CARE MNGMENT TRAINING: CPT

## 2022-10-10 PROCEDURE — 2580000003 HC RX 258: Performed by: FAMILY MEDICINE

## 2022-10-10 PROCEDURE — 82947 ASSAY GLUCOSE BLOOD QUANT: CPT

## 2022-10-10 PROCEDURE — 36415 COLL VENOUS BLD VENIPUNCTURE: CPT

## 2022-10-10 PROCEDURE — 2580000003 HC RX 258: Performed by: INTERNAL MEDICINE

## 2022-10-10 PROCEDURE — 6370000000 HC RX 637 (ALT 250 FOR IP): Performed by: PSYCHIATRY & NEUROLOGY

## 2022-10-10 PROCEDURE — 6370000000 HC RX 637 (ALT 250 FOR IP): Performed by: FAMILY MEDICINE

## 2022-10-10 PROCEDURE — 85025 COMPLETE CBC W/AUTO DIFF WBC: CPT

## 2022-10-10 PROCEDURE — 97110 THERAPEUTIC EXERCISES: CPT

## 2022-10-10 PROCEDURE — 97530 THERAPEUTIC ACTIVITIES: CPT

## 2022-10-10 PROCEDURE — 80048 BASIC METABOLIC PNL TOTAL CA: CPT

## 2022-10-10 PROCEDURE — 1200000000 HC SEMI PRIVATE

## 2022-10-10 PROCEDURE — 6360000002 HC RX W HCPCS: Performed by: INTERNAL MEDICINE

## 2022-10-10 RX ADMIN — ALLOPURINOL 300 MG: 300 TABLET ORAL at 08:16

## 2022-10-10 RX ADMIN — SODIUM CHLORIDE, PRESERVATIVE FREE 10 ML: 5 INJECTION INTRAVENOUS at 22:37

## 2022-10-10 RX ADMIN — Medication 9 MG: at 17:36

## 2022-10-10 RX ADMIN — CEFTRIAXONE SODIUM 1000 MG: 1 INJECTION, POWDER, FOR SOLUTION INTRAMUSCULAR; INTRAVENOUS at 00:51

## 2022-10-10 RX ADMIN — SODIUM CHLORIDE, PRESERVATIVE FREE 10 ML: 5 INJECTION INTRAVENOUS at 08:18

## 2022-10-10 RX ADMIN — GALANTAMINE 8 MG: 4 TABLET, FILM COATED ORAL at 17:36

## 2022-10-10 RX ADMIN — TAMSULOSIN HYDROCHLORIDE 0.4 MG: 0.4 CAPSULE ORAL at 08:17

## 2022-10-10 RX ADMIN — CARVEDILOL 6.25 MG: 6.25 TABLET, FILM COATED ORAL at 17:36

## 2022-10-10 RX ADMIN — GALANTAMINE 8 MG: 4 TABLET, FILM COATED ORAL at 08:17

## 2022-10-10 RX ADMIN — CARVEDILOL 6.25 MG: 6.25 TABLET, FILM COATED ORAL at 08:17

## 2022-10-10 RX ADMIN — QUETIAPINE FUMARATE 100 MG: 100 TABLET ORAL at 22:32

## 2022-10-10 RX ADMIN — ASPIRIN 81 MG: 81 TABLET, COATED ORAL at 08:16

## 2022-10-10 ASSESSMENT — PAIN SCALES - GENERAL: PAINLEVEL_OUTOF10: 0

## 2022-10-10 NOTE — PROGRESS NOTES
Patient very drowsy and having difficulty following commands to take anything by mouth. BS checked and WDL. See chart for more details. Patient states he will try to eat \"in a little bit\".

## 2022-10-10 NOTE — PROGRESS NOTES
Comprehensive Nutrition Assessment    Type and Reason for Visit:  Initial, RD Nutrition Re-Screen/LOS    Nutrition Recommendations/Plan:   Will continue Regular diet  Will add Ensure Enlive all trays     Malnutrition Assessment:  Malnutrition Status: At risk for malnutrition (Comment) (10/10/22 1229)    Context:  Acute Illness     Findings of the 6 clinical characteristics of malnutrition:  Energy Intake:  50% or less of estimated energy requirements for 5 or more days  Weight Loss:  No significant weight loss     Body Fat Loss:  No significant body fat loss     Muscle Mass Loss:  No significant muscle mass loss    Fluid Accumulation:  No significant fluid accumulation     Strength:  Not Performed    Nutrition Assessment:    Pt was admitted due to AMS. Intake has been inadequate (less than 25%). Observed breakfast tray consume around 25% of pudding. Nutrition Related Findings:    no edema, Labs: GLu 116, Meds: Reviewed, PMH: Dementia Wound Type: None       Current Nutrition Intake & Therapies:    Average Meal Intake: 1-25%     ADULT DIET; Regular  ADULT ORAL NUTRITION SUPPLEMENT; Breakfast, Lunch, Dinner; Standard High Calorie/High Protein Oral Supplement    Anthropometric Measures:  Height: 5' 11\" (180.3 cm)  Ideal Body Weight (IBW): 172 lbs (78 kg)    Admission Body Weight: 206 lb (93.4 kg)  Current Body Weight: 206 lb (93.4 kg),   IBW. Weight Source: Stated  Current BMI (kg/m2): 28.7  Usual Body Weight: 207 lb (93.9 kg)  % Weight Change (Calculated): -0.5                    BMI Categories: Overweight (BMI 25.0-29. 9)    Estimated Daily Nutrient Needs:  Energy Requirements Based On: Formula  Weight Used for Energy Requirements: Admission  Energy (kcal/day): Rush x 1.1= 8586-4862 kcal  Weight Used for Protein Requirements: Admission  Protein (g/day): 1.3g/kg= 120 g       Nutrition Diagnosis:   Inadequate oral intake related to  (current medical condition) as evidenced by intake 0-25%    Nutrition Interventions:   Food and/or Nutrient Delivery: Continue Current Diet, Start Oral Nutrition Supplement  Nutrition Education/Counseling: No recommendation at this time  Coordination of Nutrition Care: Continue to monitor while inpatient       Goals:     Goals: PO intake 50% or greater       Nutrition Monitoring and Evaluation:      Food/Nutrient Intake Outcomes: Food and Nutrient Intake, Supplement Intake  Physical Signs/Symptoms Outcomes: Biochemical Data, GI Status, Fluid Status or Edema, Chewing or Swallowing, Skin, Weight    Discharge Planning:    Continue current diet, Continue Oral Nutrition Supplement     Saul Nguyễn, [address], LD  Contact: [phone]

## 2022-10-10 NOTE — CARE COORDINATION
SW spoke with [facility]. [facility] is still awaiting decision about if they can accept or not. Marcelino plans to complete an onsite with patient 10/10.  Electronically signed by JAMILAH Casey on 10/10/2022 at 9:15 AM

## 2022-10-10 NOTE — PROGRESS NOTES
[facility]    INPATIENT OCCUPATIONAL THERAPY  PROGRESS NOTE  Date: 10/10/2022  Patient Name: Jerel Crockett       Room:   MRN: [id number]    : 1945  (65 y.o.)  Gender: male   Referring Practitioner: Kassidy Monzon MD  Diagnosis: AMS    Restrictions/Precautions  Restrictions/Precautions  Restrictions/Precautions: Fall Risk  Required Braces or Orthoses?: No  Implants present? :  (h/o cervical and lumbar sx, ankle sx)         Subjective  Subjective  Subjective: \"Not doing too well\" patient states in regards to mobility. Subjective  Subjective: \"I see a dinosaur\" Patient hallucinating dinosaur outside. RN Endy Mccollum notified. Pain: Patient denies pain. Objective  Orientation  Overall Orientation Status: Impaired  Orientation Level: Oriented to person;Disoriented to place; Disoriented to time;Disoriented to situation  Cognition  Overall Cognitive Status: Exceptions  Arousal/Alertness: Inconsistent responses to stimuli  Following Commands: Inconsistently follows commands  Attention Span: Attends with cues to redirect; Difficulty dividing attention  Memory: Decreased long term memory;Decreased short term memory;Decreased recall of biographical Information;Decreased recall of recent events  Safety Judgement: Decreased awareness of need for safety;Decreased awareness of need for assistance  Problem Solving: Decreased awareness of errors;Assistance required to correct errors made;Assistance required to implement solutions;Assistance required to identify errors made;Assistance required to generate solutions  Insights: Not aware of deficits  Initiation: Requires cues for all  Sequencing: Requires cues for all      ADL  Feeding: Maximum assistance  Feeding Skilled Clinical Factors: Assist with feeding self due to cognitive barriers.  RN aware and present to assist with patient's breakfast at end of session  Grooming: Maximum assistance  Grooming Skilled Clinical Factors: Hand over hand to initiate face washing while seated EOB. Min Assist for sitting balance and Maximal assist to complete task. Patient able to bathe chin, assist for remainder of washing face. UE Bathing: Dependent/Total  LE Bathing: Dependent/Total  UE Dressing: Dependent/Total  LE Dressing: Dependent/Total  Toileting: Dependent/Total  Additional Comments: ADL scores based on skilled observations and clinical reasoning unless otherwise noted. Patient requires significant assistance with self-care due to cognitive status, significant weakness, inconsistency with following 1-step commands, and balance deficits. Balance  Balance  Sitting Balance: Dependent/Total (Initially Maximal assist x 2, progressing to Minimal assist x 1 with R lateral lean noted)  Standing Balance: Dependent/Total (Assist x 2 with use of Yusef Jasvir)    Transfers/Mobility  Bed mobility  Supine to Sit: 2 Person assistance;Maximum assistance  Sit to Supine: 2 Person assistance;Maximum assistance  Transfers  Sit to stand: 2 Person assistance;Maximum assistance  Stand to sit: 2 Person assistance;Maximum assistance  Transfer Comments: with use of Yusef Jasvir    Functional Mobility  Functional Mobility Comments: Not safe to attempt this date due to cognitive status, balance deficits, and weakness.         Patient Education  Patient Education  Education Given To: Patient  Education Provided: Role of Therapy, Plan of Care, Transfer Training, Precautions  Education Method: Verbal, Demonstration  Barriers to Learning: Cognition  Education Outcome: Continued education needed      Goals  Short Term Goals  Time Frame for Short Term Goals: By discharge  Short Term Goal 1: Pt will complete upper body dressing/bathing with min A and Good attention to task  Short Term Goal 2: Pt will complete simple grooming task/self feeding with SBA and min verbal cues  Short Term Goal 3: Pt will complete lower body dressing/bathing with Max A and Good attention to task  Short Term Goal 4: Pt will complete functional transfers/mobility with mod A and Good safety with use of least restrictive device  Short Term Goal 5: Pt will follow simple 1-2 step commands 75% of the time to increase participation in daily activities  Short Term Goal 6: Pt will participate in 15+ minutes of therapeutic exercies/functional activities to increase safety and independence with self care and mobility  Occupational Therapy Plan  Times Per Week: 4-6  Times Per Day:  Once a day  Current Treatment Recommendations: Self-Care / ADL, Strengthening, ROM, Balance training, Functional mobility training, Endurance training, Cognitive reorientation, Pain management, Safety education & training, Patient/Caregiver education & training, Equipment evaluation, education, & procurement, Cognitive/Perceptual training, Coordination training      Assessment  Activity Tolerance  Activity Tolerance: Treatment limited secondary to decreased cognition  Assessment  Performance deficits / Impairments: Decreased ADL status, Decreased functional mobility , Decreased strength, Decreased safe awareness, Decreased cognition, Decreased endurance, Decreased balance, Decreased high-level IADLs, Decreased fine motor control, Decreased coordination, Decreased posture  Treatment Diagnosis: Impaired self care status  Prognosis: Fair  Decision Making: Medium Complexity  Discharge Recommendations: Patient would benefit from continued therapy after discharge  OT Equipment Recommendations  Other: TBD  Safety Devices  Type of Devices: Call light within reach, Patient at risk for falls, Left in bed, Bed alarm in place      AM-PAC Daily Activities Inpatient  AM-PAC Daily Activity Inpatient   How much help for putting on and taking off regular lower body clothing?: Total  How much help for Bathing?: Total  How much help for Toileting?: Total  How much help for putting on and taking off regular upper body clothing?: Total  How much help for taking care of personal grooming?: A Lot  How much help for eating meals?: A Lot  AM-PAC Inpatient Daily Activity Raw Score: 8  AM-PAC Inpatient ADL T-Scale Score : 22.86  ADL Inpatient CMS 0-100% Score: 85.69  ADL Inpatient CMS G-Code Modifier : CM    OT Minutes  OT Individual Minutes  Time In: 5807  Time Out: 9991  Minutes: 28  Time Code Minutes   Timed Code Treatment Minutes: 28 Minutes    Electronically signed by Jeannie Hardwick OT on 10/10/2022 at 9:05 AM

## 2022-10-10 NOTE — CARE COORDINATION
Argenis from [facility] informed SW that patients wife does not want him to discharge to [facility]. SW will follow up with wife. Electronically signed by JAMILAH Andrews on 10/10/2022 at 12:52 PM     SW spoke with wife with  from [facility] present. Spouse reported that she DID NOT want patient to go to [facility]. Spouse was agreeable to have referral sent to [address] [place] [address] as first choice, and [facility] as second choice. [address] [place] [address] has to check out of network benefits with [employer]. [facility] informed SW that they can accept patient. Patient can have a private room as well. SW will follow up with spouse. Electronically signed by JAMILAH Andrews on 10/10/2022 at 1:42 PM     Spoke with spouse. Admissions informed SW that patient would be a better fit for [facility]. SW spoke with wife and explained this information to her. Spouse reported that [facility] was too far. Spouse is agreeable to have authorization started for [facility]. Electronically signed by JAMILAH Andrews on 10/10/2022 at 3:43 PM     Authorization started for [facility].  Electronically signed by JAMILAH Andrews on 10/10/2022 at 3:59 PM

## 2022-10-10 NOTE — PROGRESS NOTES
10/10/22 1150   Encounter Summary   Encounter Overview/Reason  Spiritual/Emotional Needs   Service Provided For: Patient   Referral/Consult From: Palliative Care   Last Encounter  10/10/22   Complexity of Encounter Low   Spiritual/Emotional needs   Type Spiritual Support   Palliative Care   Type Palliative Care, Follow-up   Assessment/Intervention/Outcome   Assessment Unable to assess   Intervention Prayer (assurance of)/Heron

## 2022-10-10 NOTE — PROGRESS NOTES
Physical Therapy  Facility/Department: [facility] MED SURG  Daily Treatment Note  NAME: Justine Ramirez  : 1945  MRN: [id number]    Date of Service: 10/10/2022    Discharge Recommendations:  Patient would benefit from continued therapy after discharge        Patient Diagnosis(es): The encounter diagnosis was Altered mental status, unspecified altered mental status type. Assessment   Activity Tolerance: Treatment limited secondary to decreased cognition     Plan    Physcial Therapy Plan  General Plan: 3-5 times per week  Current Treatment Recommendations: Strengthening;Balance training;Functional mobility training;Transfer training;Gait training; Endurance training;Cognitive/Perceptual training; Safety education & training;Positioning;Cognitive reorientation;Patient/Caregiver education & training;Equipment evaluation, education, & procurement; Therapeutic activities     Restrictions  Restrictions/Precautions  Restrictions/Precautions: Fall Risk  Required Braces or Orthoses?: No  Implants present? :  (h/o cervical and lumbar sx, ankle sx)  Position Activity Restriction  Other position/activity restrictions: up with assist, 1:1 sitter, bed alarm, personal alarm when in chair     Subjective    Subjective  Subjective: Pt reports first name but takes several attempts to understand last name, pt is difficult to keep focused and even reports \"I see a dinosaur\" Patient hallucinating dinosaur outside. RN Salvador Martínez updated. Co-treat with OTR Chantelle  Pain: Patient denies pain. Orientation  Overall Orientation Status: Impaired  Orientation Level: Oriented to person;Disoriented to place; Disoriented to time;Disoriented to situation  Cognition  Overall Cognitive Status: Exceptions  Arousal/Alertness: Inconsistent responses to stimuli  Following Commands: Inconsistently follows commands  Attention Span: Attends with cues to redirect; Difficulty dividing attention  Memory: Decreased long term memory;Decreased short term memory;Decreased recall of biographical Information;Decreased recall of recent events  Safety Judgement: Decreased awareness of need for safety;Decreased awareness of need for assistance  Problem Solving: Decreased awareness of errors;Assistance required to correct errors made;Assistance required to implement solutions;Assistance required to identify errors made;Assistance required to generate solutions  Insights: Not aware of deficits  Initiation: Requires cues for all  Sequencing: Requires cues for all     Objective   Vitals     Bed Mobility Training  Interventions: Safety awareness training;Verbal cues; Tactile cues  Rolling: Maximum assistance;Assist X2;Additional time  Supine to Sit: Maximum assistance;Assist X2;Additional time; Adaptive equipment  Sit to Supine: Maximum assistance;Assist X2;Additional time; Adaptive equipment  Scooting: Maximum assistance;Assist X2;Additional time  Balance  Sitting: Impaired  Sitting - Static: Poor (constant support) (Started as Max Ax2 improved to Kaylee briefly, heavy right lateral lean)  Sitting - Dynamic: Not tested  Standing: Impaired  Standing - Static: Poor (Standing with Jeannetta Slipper, bed elevated to assist with stand)  Standing - Dynamic: Not tested  Transfer Training  Transfer Training: Yes  Interventions: Safety awareness training; Tactile cues  Sit to Stand: Maximum assistance;Assist X2;Additional time; Adaptive equipment (standing in Jeannetta Slipper with bed elevated)  Stand to Sit: Maximum assistance;Assist X2;Additional time; Adaptive equipment Jeannetta Slipper)  Gait Training  Gait Training: No (Not safe at this time)     PT Exercises  Exercise Treatment: Bed Mobility with repositioning and off loading to prevent pressure wound on coccyx  Static Sitting Balance Exercises: Dangle EOB 15-20 min.          AM-PAC Mobility Inpatient   How much difficulty turning over in bed?: Unable  How much difficulty sitting down on / standing up from a chair with arms?: Unable  How much difficulty moving from lying on back to sitting on side of bed?: Unable  How much help from another person moving to and from a bed to a chair?: Total  How much help from another person needed to walk in hospital room?: Total  How much help from another person for climbing 3-5 steps with a railing?: Total  AM-PAC Inpatient Mobility Raw Score : 6  AM-PAC Inpatient T-Scale Score : 23.55  Mobility Inpatient CMS 0-100% Score: 100  Mobility Inpatient CMS G-Code Modifier : CN      Goals  Short Term Goals  Time Frame for Short Term Goals: 7 days  Short Term Goal 1: Pt will increase Bilat LE strength to 4-/5 to maximize mobiltiy  Short Term Goal 2: Pt will perform bed mobiltiy Mod A  Short Term Goal 3: Pt will perform transfers with RW Mod A  Short Term Goal 4: Pt will ambulate with RW 50 ft Mod A  Additional Goals?: No  Patient Goals   Patient Goals : unable to state    Education  Patient Education  Education Given To: Patient;Staff;Caregiver (JUVENTINO Dozier Updated)  Education Provided: Precautions;Transfer Training; Fall Prevention Strategies  Education Method: Demonstration;Verbal  Barriers to Learning: Cognition  Education Outcome: Continued education needed    Therapy Time   Individual Concurrent Group Co-treatment   Time In [facility]         Time Out 1471         Minutes [address]

## 2022-10-10 NOTE — PROGRESS NOTES
Hospitalist Progress Note  10/10/2022 11:53 AM  Subjective:   Admit Date: 10/3/2022  PCP: Zuleika Pereira MD     DNR-CCA      C/c:  Chief Complaint   Patient presents with    Altered Mental Status         Interval History: awaiting  for placement in memory center,     Diet: ADULT DIET; Regular  ADULT ORAL NUTRITION SUPPLEMENT; Breakfast, Lunch, Dinner; Standard High Calorie/High Protein Oral Supplement                                ip days:7  Medications:   Scheduled Meds:   cefTRIAXone (ROCEPHIN) IV  1,000 mg IntraVENous Q24H    carvedilol  6.25 mg Oral BID WC    LORazepam  0.5 mg IntraVENous Once    allopurinol  300 mg Oral Daily    aspirin  81 mg Oral Daily    melatonin  9 mg Oral QPM    [Held by provider] spironolactone  25 mg Oral Daily    tamsulosin  0.4 mg Oral Daily    QUEtiapine  100 mg Oral Nightly    galantamine  8 mg Oral BID WC    sodium chloride flush  5-40 mL IntraVENous 2 times per day    [Held by provider] enoxaparin  30 mg SubCUTAneous Daily     Continuous Infusions:   sodium chloride Stopped (10/08/22 0132)     PRN Meds:. LORazepam, haloperidol lactate, sodium chloride flush, sodium chloride, acetaminophen, ondansetron **OR** ondansetron     CBC:   Recent Labs     10/07/22  1631 10/09/22  0611 10/10/22  0602   WBC 17.4* 14.3* 12.7*   HGB 15.6 14.0 13.4*    209 222     BMP:    Recent Labs     10/09/22  0611 10/10/22  0602    140   K 4.7 4.8    108*   CO2 21 19*   BUN 66* 76*   CREATININE 3.37* 3.25*   GLUCOSE 108* 116*     Hepatic: No results for input(s): AST, ALT, ALB, BILITOT, ALKPHOS in the last 72 hours. Troponin: No results for input(s): TROPONINI in the last 72 hours. BNP: No results for input(s): BNP in the last 72 hours. Lipids: No results for input(s): CHOL, HDL in the last 72 hours. Invalid input(s): LDLCALCU  INR: No results for input(s): INR in the last 72 hours.     Objective:   Vitals: BP (!) 144/81   Pulse 65   Temp 98.4 °F (36.9 °C)   Resp 20   Ht 5' 11\" (1.803 m)   Wt 206 lb (93.4 kg)   SpO2 97%   BMI 28.73 kg/m²   General appearance: alert, appears stated age and cooperative  Skin: Skin color, texture, turgor normal. No rashes or lesions  Lungs: clear to auscultation bilaterally  Heart: regular rate and rhythm, S1, S2 normal, no murmur, click, rub or gallop  Abdomen: soft, non-tender; bowel sounds normal; no masses,  no organomegaly  Extremities: extremities normal, atraumatic, no cyanosis or edema  Neurologic: Mental status: Alert, oriented, thought content appropriate    Prophylaxis:   DVT with  [x] lovenox        [] heparin        [] Scd        [] none:     Radiology:  CT HEAD WO CONTRAST    Result Date: 10/3/2022  EXAMINATION: CT OF THE HEAD WITHOUT CONTRAST  10/3/2022 11:27 am TECHNIQUE: CT of the head was performed without the administration of intravenous contrast. Automated exposure control, iterative reconstruction, and/or weight based adjustment of the mA/kV was utilized to reduce the radiation dose to as low as reasonably achievable. COMPARISON: 01/21/2021 HISTORY: ORDERING SYSTEM PROVIDED HISTORY: Mental Status Changes TECHNOLOGIST PROVIDED HISTORY: Mental Status Changes Decision Support Exception - unselect if not a suspected or confirmed emergency medical condition->Emergency Medical Condition (MA) Reason for Exam: AMS. FINDINGS: BRAIN/VENTRICLES: There is no acute intracranial hemorrhage, mass effect or midline shift. No abnormal extra-axial fluid collection. The gray-white differentiation is maintained without evidence of an acute infarct. There is no evidence of hydrocephalus. ORBITS: The visualized portion of the orbits demonstrate no acute abnormality. SINUSES: There is mild mucosal thickening in the right frontal, ethmoid, and bilateral sphenoid sinuses. Mild mucosal thickening in the right maxillary sinus. There are bubbly secretions in the posterior right ethmoid sinuses. No air-fluid level. Mastoid air cells are aerated.  SOFT TISSUES/SKULL:  No acute abnormality of the visualized skull or soft tissues. No acute intracranial abnormality. VL Carotid Bilateral    Result Date: 10/5/2022    Replaced by [facility]  Vascular Carotid Procedure   Patient Name Janet Kimble Date of Study           10/04/2022               O   Date of      1945  Gender                  Male  Birth   Age          65 year(s)  Race                       Room Number  2069   Corporate ID [id number]  #   Patient Pati [de-identified]  #   MR #         [id number]      Tereza Gudino   Accession #  [id number]  Interpreting Physician  Cesario Ramirez   Referring                Referring Physician     Zev Hooper  Nurse  Practitioner  Procedure Type of Study:   Cerebral: Carotid, Carotid Scan Bilateral.  Indications for Study:Neck distention and Carotid stenosis. Patient Status: In Patient. Technical Quality:Adequate visualization. Conclusions   Summary   Bilateral:  There is plaque at the level of the carotid bifurcation with a velocity  profile consistent with no significant stenosis . The vertebral arteries  are patent with antegrade flow. Signature   ----------------------------------------------------------------  Electronically signed by Raffy Arriola(Sonographer) on  10/04/2022 11:36 AM  ----------------------------------------------------------------   ----------------------------------------------------------------  Electronically signed by Cesario Ramirez(Interpreting physician)  on 10/05/2022 06:29 AM  ----------------------------------------------------------------  Findings:   Right Impression:                    Left Impression:  The common carotid artery is normal. The common carotid artery is normal.   The carotid bulb is normal.          The carotid bulb is normal.   The external carotid artery has a    The external carotid artery has a  smooth calcific plaque. smooth calcific plaque.    The internal carotid artery has a    The internal carotid artery has a  smooth calcific plaque causing a     smooth calcific plaque causing a <50%  <50% stenosis based on velocities. stenosis based on velocities. The vertebral artery is patent with  The vertebral artery is patent with  antegrade flow. antegrade flow. Velocities are measured in cm/s ; Diameters are measured in cm Carotid Right Measurements +------------+-------+------+--------+-------+------------+----------------+ ! Location    ! PSV    ! EDV   ! Angle   ! RI     !%Stenosis   ! Tortuosity      ! +------------+-------+------+--------+-------+------------+----------------+ ! Prox CCA    !59.6   ! 10    !        !0.83   !            !                ! +------------+-------+------+--------+-------+------------+----------------+ ! Mid CCA     !52.2   ! 10    !        !0.81   !            !                ! +------------+-------+------+--------+-------+------------+----------------+ ! Dist CCA    !52.2   !11    !        !0.79   !            !                ! +------------+-------+------+--------+-------+------------+----------------+ ! Bulb        !44.7   !11    !        !0.75   !            !                ! +------------+-------+------+--------+-------+------------+----------------+ ! Prox ICA    !37.3   !8     !        !0.79   !            !                ! +------------+-------+------+--------+-------+------------+----------------+ ! Mid ICA     !71     !18    !        !0.74   !            !                ! +------------+-------+------+--------+-------+------------+----------------+ ! Dist ICA    !74.6   !20    !        !0.73   !            !                ! +------------+-------+------+--------+-------+------------+----------------+ ! Prox ECA    !52.8   !5     !        !0.91   !            !                ! +------------+-------+------+--------+-------+------------+----------------+ ! Vertebral   !31.1   !0     !        !1      !            ! ! +------------+-------+------+--------+-------+------------+----------------+   - Additional Measurements:ICAPSV/CCAPSV 1.25. ICAEDV/CCAEDV 2. Carotid Left Measurements +------------+-------+------+--------+-------+------------+----------------+ ! Location    ! PSV    ! EDV   ! Angle   ! RI     !%Stenosis   ! Tortuosity      ! +------------+-------+------+--------+-------+------------+----------------+ ! Prox CCA    !68.3   !13    !        !0.81   !            !                ! +------------+-------+------+--------+-------+------------+----------------+ ! Mid CCA     !82.6   !14    !        !0.83   !            !                ! +------------+-------+------+--------+-------+------------+----------------+ ! Dist CCA    !84.5   !17    !        !0.8    ! !                ! +------------+-------+------+--------+-------+------------+----------------+ ! Bulb        !47.8   ! 10    !        !0.79   !            !                ! +------------+-------+------+--------+-------+------------+----------------+ ! Prox ICA    !75.2   ! 19    !        !0.75   !            !                ! +------------+-------+------+--------+-------+------------+----------------+ ! Mid ICA     !104    !32    !        !0.69   !            !                ! +------------+-------+------+--------+-------+------------+----------------+ ! Dist ICA    ! 97.5   !26    !        !0.73   !            !                ! +------------+-------+------+--------+-------+------------+----------------+ ! Prox ECA    !72.7   !12    !        !0.83   !            !                ! +------------+-------+------+--------+-------+------------+----------------+ ! Vertebral   !34.2   !6     !        !0.82   !            !                ! +------------+-------+------+--------+-------+------------+----------------+   - Additional Measurements:ICAPSV/CCAPSV 1.52. ICAEDV/CCAEDV 2.46. Assessment :   Dementia/await placement  No growth for 48 rs     Plan:   1. Continue present plan  2. See order    Patient Active Problem List:     History of left inguinal hernia     Kidney disease, chronic, stage IV (severe, EGFR 15-29 ml/min) (HCC)     Isolated proteinuria     NSAID long-term use     Essential hypertension     Primary osteoarthritis of hand     Mitral valve prolapse     Interstitial nephritis chronic     MPGN (membranoproliferative glomerulonephritis), type 1     Nephrotic syndrome     Weakness     MILAGRO (acute kidney injury) ([facility] [address]     Urinary incontinence     Gait disturbance     BPH (benign prostatic hyperplasia)     Vascular dementia without behavioral disturbance ([address]     AMS (altered mental status)     Acute encephalopathy     Memory loss     Cognitive impairment      Anticipated Disposition upon discharge: [] Home                                                                         [] Home with Home Health                                                                         [] [facility]                                                                         [] [address]      Patient is admitted as inpatient status because of co-morbidities listed above, severity of signs and symptoms as outlined, requirement for current medical therapies and most importantly because of direct risk to patient if care not provided in a hospital setting.           Gita Floyd MD, MD  Rounding Hospitalist

## 2022-10-11 LAB
ABSOLUTE EOS #: 1.2 K/UL (ref 0–0.4)
ABSOLUTE LYMPH #: 0.8 K/UL (ref 1–4.8)
ABSOLUTE MONO #: 1.2 K/UL (ref 0.1–1.3)
ANION GAP SERPL CALCULATED.3IONS-SCNC: 15 MMOL/L (ref 9–17)
BASOPHILS # BLD: 1 % (ref 0–2)
BASOPHILS ABSOLUTE: 0.1 K/UL (ref 0–0.2)
BUN BLDV-MCNC: 83 MG/DL (ref 8–23)
CALCIUM SERPL-MCNC: 9.4 MG/DL (ref 8.6–10.4)
CHLORIDE BLD-SCNC: 108 MMOL/L (ref 98–107)
CO2: 19 MMOL/L (ref 20–31)
CREAT SERPL-MCNC: 3.55 MG/DL (ref 0.7–1.2)
EOSINOPHILS RELATIVE PERCENT: 10 % (ref 0–4)
GFR SERPL CREATININE-BSD FRML MDRD: 17 ML/MIN/1.73M2
GLUCOSE BLD-MCNC: 108 MG/DL (ref 70–99)
HCT VFR BLD CALC: 39.4 % (ref 41–53)
HEMOGLOBIN: 13.1 G/DL (ref 13.5–17.5)
LYMPHOCYTES # BLD: 7 % (ref 24–44)
MCH RBC QN AUTO: 31.7 PG (ref 26–34)
MCHC RBC AUTO-ENTMCNC: 33.3 G/DL (ref 31–37)
MCV RBC AUTO: 94.9 FL (ref 80–100)
MONOCYTES # BLD: 10 % (ref 1–7)
PDW BLD-RTO: 14.9 % (ref 11.5–14.9)
PLATELET # BLD: 231 K/UL (ref 150–450)
PMV BLD AUTO: 9 FL (ref 6–12)
POTASSIUM SERPL-SCNC: 4.8 MMOL/L (ref 3.7–5.3)
RBC # BLD: 4.14 M/UL (ref 4.5–5.9)
SEG NEUTROPHILS: 72 % (ref 36–66)
SEGMENTED NEUTROPHILS ABSOLUTE COUNT: 9.2 K/UL (ref 1.3–9.1)
SODIUM BLD-SCNC: 142 MMOL/L (ref 135–144)
WBC # BLD: 12.5 K/UL (ref 3.5–11)

## 2022-10-11 PROCEDURE — 80048 BASIC METABOLIC PNL TOTAL CA: CPT

## 2022-10-11 PROCEDURE — 2580000003 HC RX 258: Performed by: FAMILY MEDICINE

## 2022-10-11 PROCEDURE — 6370000000 HC RX 637 (ALT 250 FOR IP): Performed by: PSYCHIATRY & NEUROLOGY

## 2022-10-11 PROCEDURE — 6360000002 HC RX W HCPCS: Performed by: INTERNAL MEDICINE

## 2022-10-11 PROCEDURE — 97530 THERAPEUTIC ACTIVITIES: CPT

## 2022-10-11 PROCEDURE — 36415 COLL VENOUS BLD VENIPUNCTURE: CPT

## 2022-10-11 PROCEDURE — 6370000000 HC RX 637 (ALT 250 FOR IP): Performed by: FAMILY MEDICINE

## 2022-10-11 PROCEDURE — 1200000000 HC SEMI PRIVATE

## 2022-10-11 PROCEDURE — 2580000003 HC RX 258: Performed by: INTERNAL MEDICINE

## 2022-10-11 PROCEDURE — 85025 COMPLETE CBC W/AUTO DIFF WBC: CPT

## 2022-10-11 RX ADMIN — SODIUM CHLORIDE, PRESERVATIVE FREE 10 ML: 5 INJECTION INTRAVENOUS at 20:09

## 2022-10-11 RX ADMIN — ASPIRIN 81 MG: 81 TABLET, COATED ORAL at 09:07

## 2022-10-11 RX ADMIN — GALANTAMINE 8 MG: 4 TABLET, FILM COATED ORAL at 18:25

## 2022-10-11 RX ADMIN — CEFTRIAXONE SODIUM 1000 MG: 1 INJECTION, POWDER, FOR SOLUTION INTRAMUSCULAR; INTRAVENOUS at 01:26

## 2022-10-11 RX ADMIN — CARVEDILOL 6.25 MG: 6.25 TABLET, FILM COATED ORAL at 09:06

## 2022-10-11 RX ADMIN — TAMSULOSIN HYDROCHLORIDE 0.4 MG: 0.4 CAPSULE ORAL at 09:06

## 2022-10-11 RX ADMIN — QUETIAPINE FUMARATE 100 MG: 100 TABLET ORAL at 20:09

## 2022-10-11 RX ADMIN — GALANTAMINE 8 MG: 4 TABLET, FILM COATED ORAL at 09:07

## 2022-10-11 RX ADMIN — CARVEDILOL 6.25 MG: 6.25 TABLET, FILM COATED ORAL at 18:25

## 2022-10-11 RX ADMIN — SODIUM CHLORIDE, PRESERVATIVE FREE 10 ML: 5 INJECTION INTRAVENOUS at 09:48

## 2022-10-11 RX ADMIN — Medication 9 MG: at 18:25

## 2022-10-11 RX ADMIN — ALLOPURINOL 300 MG: 300 TABLET ORAL at 09:06

## 2022-10-11 ASSESSMENT — PAIN SCALES - GENERAL: PAINLEVEL_OUTOF10: 0

## 2022-10-11 NOTE — PLAN OF CARE
Problem: Discharge Planning  Goal: Discharge to home or other facility with appropriate resources  Outcome: Progressing  Flowsheets (Taken 10/11/2022 0402)  Discharge to home or other facility with appropriate resources:   Identify barriers to discharge with patient and caregiver   Arrange for needed discharge resources and transportation as appropriate   Identify discharge learning needs (meds, wound care, etc)   Refer to discharge planning if patient needs post-hospital services based on physician order or complex needs related to functional status, cognitive ability or social support system     Problem: Safety - Adult  Goal: Free from fall injury  Outcome: Progressing  Flowsheets (Taken 10/11/2022 0402)  Free From Fall Injury:   Instruct family/caregiver on patient safety   Based on caregiver fall risk screen, instruct family/caregiver to ask for assistance with transferring infant if caregiver noted to have fall risk factors     Problem: Pain  Goal: Verbalizes/displays adequate comfort level or baseline comfort level  Outcome: Progressing  Flowsheets (Taken 10/11/2022 0402)  Verbalizes/displays adequate comfort level or baseline comfort level:   Encourage patient to monitor pain and request assistance   Assess pain using appropriate pain scale   Administer analgesics based on type and severity of pain and evaluate response   Implement non-pharmacological measures as appropriate and evaluate response   Consider cultural and social influences on pain and pain management   Notify Licensed Independent Practitioner if interventions unsuccessful or patient reports new pain     Problem: ABCDS Injury Assessment  Goal: Absence of physical injury  Outcome: Progressing  Flowsheets (Taken 10/11/2022 0402)  Absence of Physical Injury: Implement safety measures based on patient assessment     Problem: Neurosensory - Adult  Goal: Achieves maximal functionality and self care  Outcome: Progressing  Flowsheets (Taken 10/11/2022 0402)  Achieves maximal functionality and self care:   Monitor swallowing and airway patency with patient fatigue and changes in neurological status   Encourage and assist patient to increase activity and self care with guidance from physical therapy/occupational therapy   Encourage visually impaired, hearing impaired and aphasic patients to use assistive/communication devices     Problem: Skin/Tissue Integrity - Adult  Goal: Skin integrity remains intact  Outcome: Progressing  Flowsheets (Taken 10/11/2022 0402)  Skin Integrity Remains Intact:   Monitor for areas of redness and/or skin breakdown   Assess vascular access sites hourly     Problem: Skin/Tissue Integrity  Goal: Absence of new skin breakdown  Description: 1. Monitor for areas of redness and/or skin breakdown  2. Assess vascular access sites hourly  3. Every 4-6 hours minimum:  Change oxygen saturation probe site  4. Every 4-6 hours:  If on nasal continuous positive airway pressure, respiratory therapy assess nares and determine need for appliance change or resting period.   Outcome: Progressing  Note: monitoring     Problem: Nutrition Deficit:  Goal: Optimize nutritional status  Outcome: Progressing  Flowsheets (Taken 10/11/2022 0402)  Nutrient intake appropriate for improving, restoring, or maintaining nutritional needs:   Provide specific nutrition education to patient or family as appropriate   Order, calculate, and assess calorie counts as needed   Recommend appropriate diets, oral nutritional supplements, and vitamin/mineral supplements   Assess nutritional status and recommend course of action   Monitor oral intake, labs, and treatment plans   Recommend, monitor, and adjust tube feedings and TPN/PPN based on assessed needs

## 2022-10-11 NOTE — PROGRESS NOTES
[facility]: [facility]   INPATIENT OCCUPATIONAL THERAPY  PROGRESS NOTE  Date: 10/11/2022  Patient Name: Jim Francis       Room:   MRN: [id number]    : 1945  (65 y.o.)  Gender: male   Referring Practitioner: Rosita Pope MD  Diagnosis: AMS    Restrictions/Precautions  Restrictions/Precautions  Restrictions/Precautions: Fall Risk  Required Braces or Orthoses?: No  Implants present? :  (h/o cervical and lumbar sx, ankle sx)         Subjective  Subjective  Subjective: \"I'll give it a try\" pt attempting to communicate, stating non-sensical comments, confused and difficult to understand throughout session  Comments: co-tx with Anika SILVA    Objective  Orientation  Overall Orientation Status: Impaired  Orientation Level: Disoriented X4  Cognition  Overall Cognitive Status: Exceptions  Arousal/Alertness: Inconsistent responses to stimuli;Delayed responses to stimuli  Following Commands: Inconsistently follows commands  Attention Span: Difficulty attending to directions  Memory: Decreased recall of biographical Information;Decreased recall of precautions;Decreased recall of recent events;Decreased short term memory;Decreased long term memory  Safety Judgement: Decreased awareness of need for safety  Problem Solving: Assistance required to identify errors made  Insights: Not aware of deficits  Initiation: Requires cues for all  Sequencing: Requires cues for all  Cognition Comment: pt pleasantly confused, cooperative with increased time, fatigued  ADL  Feeding: Maximum assistance  Feeding Skilled Clinical Factors: Assist with feeding self due to cognitive barriers, staff aware and reports assistance will be provided for meals  Grooming: Dependent/Total  Grooming Skilled Clinical Factors: unable to follow commands, unable to hold washcloth  UE Bathing: Dependent/Total  LE Bathing: Dependent/Total  UE Dressing: Dependent/Total  LE Dressing: Dependent/Total  Toileting: Dependent/Total  Additional Comments: ADL scores based on skilled observations and clinical reasoning unless otherwise noted.  Patient requires significant assistance with self-care due to cognitive status, significant weakness, inconsistency with following 1-step commands, and balance deficits         Balance  Balance  Sitting Balance: Maximum assistance (A x1 with noted R lateral lean and anterior lean, max cuing req for correction with poor carryover noted)  Standing Balance: Dependent/Total (Ax1-2, heavy anterior lean with cuing for correction, poor carryover noted, cedrick stedy)  Standing Balance  Time: >30 sec x3 with cedrick stedy  Activity: standing with cedrick stedy for transfers  Comment: heavy anterior lean with cuing for correction, poor carryover noted    Transfers/Mobility  Bed mobility  Rolling to Right: Maximum assistance;2 Person assistance  Supine to Sit: 2 Person assistance;Maximum assistance  Scooting: Maximal assistance;2 Person assistance  Bed Mobility Comments: hand over hand to reach bed rail, unable to hold, max cuing req with poor carryover noted  Transfers  Sit to stand: 2 Person assistance;Maximum assistance  Stand to sit: 2 Person assistance;Maximum assistance  Transfer Comments: using cedrick stedy, heavy anterior lean noted         Functional Activities         Patient Education  Patient Education  Education Given To: Patient  Education Provided: Role of Therapy, Plan of Care, Transfer Training, Precautions  Education Method: Verbal, Demonstration  Barriers to Learning: Cognition  Education Outcome: Continued education needed      Goals  Short Term Goals  Time Frame for Short Term Goals: By discharge  Short Term Goal 1: Pt will complete upper body dressing/bathing with min A and Good attention to task  Short Term Goal 2: Pt will complete simple grooming task/self feeding with SBA and min verbal cues  Short Term Goal 3: Pt will complete lower body dressing/bathing with Max A and Good attention to task  Short Term Goal 4: Pt will complete functional transfers/mobility with mod A and Good safety with use of least restrictive device  Short Term Goal 5: Pt will follow simple 1-2 step commands 75% of the time to increase participation in daily activities  Short Term Goal 6: Pt will participate in 15+ minutes of therapeutic exercies/functional activities to increase safety and independence with self care and mobility  Occupational Therapy Plan  Times Per Week: 4-6  Times Per Day:  Once a day  Current Treatment Recommendations: Self-Care / ADL, Strengthening, ROM, Balance training, Functional mobility training, Endurance training, Cognitive reorientation, Pain management, Safety education & training, Patient/Caregiver education & training, Equipment evaluation, education, & procurement, Cognitive/Perceptual training, Coordination training      Assessment  Activity Tolerance  Activity Tolerance: Treatment limited secondary to decreased cognition  Assessment  Performance deficits / Impairments: Decreased ADL status, Decreased functional mobility , Decreased strength, Decreased safe awareness, Decreased cognition, Decreased endurance, Decreased balance, Decreased high-level IADLs, Decreased fine motor control, Decreased coordination, Decreased posture  Assessment: pillows and wedge used to support pt while sitting in recliner, NSG made aware  Treatment Diagnosis: Impaired self care status  Prognosis: Fair  Decision Making: Medium Complexity  Discharge Recommendations: Patient would benefit from continued therapy after discharge  OT Equipment Recommendations  Other: TBD  Safety Devices  Type of Devices: Nurse notified, Left in chair, Patient at risk for falls, Gait belt, Chair alarm in place, Call light within reach (friend present in room)      AM-PAC Daily Activities Inpatient  AM-PAC Daily Activity Inpatient   How much help for Bathing?: Total  How much help for Toileting?: Total  How much help for putting on and taking off regular upper body clothing?: Total  How much help for taking care of personal grooming?: Total  How much help for eating meals?: Total    OT Minutes  OT Individual Minutes  Time In: 0701  Time Out: 1210  Minutes: 35    Electronically signed by Arlin VICTORIA on 10/11/2022 at 2:17 PM

## 2022-10-11 NOTE — PROGRESS NOTES
10/11/22 1946   Encounter Summary   Encounter Overview/Reason  Spiritual/Emotional Needs   Service Provided For: Patient   Referral/Consult From: Palliative Care   Last Encounter  10/11/22   Complexity of Encounter Low   Spiritual/Emotional needs   Type Spiritual Support   Palliative Care   Type Palliative Care, Follow-up   Assessment/Intervention/Outcome   Assessment Unable to assess  (sleeping)   Intervention Prayer (assurance of)/[place]

## 2022-10-11 NOTE — PROGRESS NOTES
Physical Therapy  Facility/Department: [facility] MED SURG  Daily Treatment Note  NAME: Penelope Gee  : 1945  MRN: [id number]    Date of Service: 10/11/2022    Discharge Recommendations:  Patient would benefit from continued therapy after discharge        Patient Diagnosis(es): The encounter diagnosis was Altered mental status, unspecified altered mental status type. Assessment   Activity Tolerance: Treatment limited secondary to decreased cognition;Patient limited by fatigue     Plan    Physcial Therapy Plan  General Plan: 3-5 times per week     Restrictions  Restrictions/Precautions  Restrictions/Precautions: Fall Risk  Required Braces or Orthoses?: No  Implants present? :  (h/o cervical and lumbar sx, ankle sx)  Position Activity Restriction    Subjective    Subjective  Subjective: Pt  positioned in semifowlers, verry difficult to rouse. JUVENTINO de santiago tx and pt is compliant. Pain: Pt c/o generalized pain with mobiltiy without providing a rating. Orientation  Overall Orientation Status: Impaired  Orientation Level: Disoriented X4  Cognition  Overall Cognitive Status: Exceptions  Arousal/Alertness: Inconsistent responses to stimuli;Delayed responses to stimuli  Following Commands: Inconsistently follows commands  Attention Span: Difficulty attending to directions  Memory: Decreased recall of biographical Information;Decreased recall of precautions;Decreased recall of recent events;Decreased short term memory;Decreased long term memory  Safety Judgement: Decreased awareness of need for safety  Problem Solving: Assistance required to identify errors made  Insights: Not aware of deficits  Initiation: Requires cues for all  Sequencing: Requires cues for all  Cognition Comment: pt pleasantly confused, cooperative with increased time, fatigued     Objective   Bed Mobility Training  Bed Mobility Training: Yes  Interventions: Safety awareness training;Verbal cues; Tactile cues  Rolling: Maximum assistance;Assist X2;Additional time  Supine to Sit: Maximum assistance;Assist X2;Additional time; Adaptive equipment  Scooting: Maximum assistance;Assist X2;Additional time (to EOB)  Balance  Sitting: Impaired  Sitting - Static: Poor (constant support) (Started as Max Ax2 improved to Kaylee briefly, heavy right lateral lean)  Sitting - Dynamic: Not tested  Standing: Impaired  Standing - Static: Poor (Standing with [facility], bed elevated to assist with stand)  Standing - Dynamic: Not tested  Transfer Training  Transfer Training: Yes  Interventions: Safety awareness training; Tactile cues  Sit to Stand: Maximum assistance;Assist X2;Additional time; Adaptive equipment (standing in [facility] with bed elevated. Unable to complete with erect posture.)  Stand to Sit: Maximum assistance;Assist X2;Additional time; Adaptive equipment [facility])  Stand Pivot Transfers: Total assistance [facility])  Gait Training  Gait Training: No (Not safe at this time)        Safety Devices  Type of Devices: Nurse notified; Left in chair;Patient at risk for falls;Gait belt; Chair alarm in place;Call light within reach (friend present in room)  Maxi Sling placed under the pt. PCT notified of Pt up in chair requiring assist with eating lunch.      AM-[facility] Mobility Inpatient   How much difficulty turning over in bed?: Unable  How much difficulty sitting down on / standing up from a chair with arms?: Unable  How much difficulty moving from lying on back to sitting on side of bed?: Unable  How much help from another person moving to and from a bed to a chair?: Total  How much help from another person needed to walk in hospital room?: Total  How much help from another person for climbing 3-5 steps with a railing?: Total  AM-PAC Inpatient Mobility Raw Score : 6  AM-PAC Inpatient T-Scale Score : 23.55  Mobility Inpatient CMS 0-100% Score: 100  Mobility Inpatient CMS G-Code Modifier : CN,    Goals  Short Term Goals  Time Frame for Short Term Goals: 7 days  Short Term Goal 1: Pt will increase Bilat LE strength to 4-/5 to maximize mobiltiy  Short Term Goal 2: Pt will perform bed mobiltiy Mod A  Short Term Goal 3: Pt will perform transfers with RW Mod A  Short Term Goal 4: Pt will ambulate with RW 50 ft Mod A  Additional Goals?: No  Patient Goals   Patient Goals : unable to state        Therapy Time   Individual Concurrent Group Co-treatment   Time In 1135         Time Out 1210         Minutes [address], [place], [place]

## 2022-10-11 NOTE — PLAN OF CARE
Problem: Discharge Planning  Goal: Discharge to home or other facility with appropriate resources  10/11/2022 1720 by Felicitas Peres RN  Outcome: Progressing  Flowsheets (Taken 10/11/2022 0845)  Discharge to home or other facility with appropriate resources:   Identify barriers to discharge with patient and caregiver   Arrange for needed discharge resources and transportation as appropriate   Identify discharge learning needs (meds, wound care, etc)  10/11/2022 0402 by Santo Davila RN  Outcome: Progressing  Flowsheets (Taken 10/11/2022 0402)  Discharge to home or other facility with appropriate resources:   Identify barriers to discharge with patient and caregiver   Arrange for needed discharge resources and transportation as appropriate   Identify discharge learning needs (meds, wound care, etc)   Refer to discharge planning if patient needs post-hospital services based on physician order or complex needs related to functional status, cognitive ability or social support system     Problem: Safety - Adult  Goal: Free from fall injury  10/11/2022 1720 by Felicitas Peres RN  Outcome: Progressing  Flowsheets (Taken 10/11/2022 1220)  Free From Fall Injury: Instruct family/caregiver on patient safety  10/11/2022 0402 by Santo Davila RN  Outcome: Progressing  Flowsheets (Taken 10/11/2022 0402)  Free From Fall Injury:   Instruct family/caregiver on patient safety   Based on caregiver fall risk screen, instruct family/caregiver to ask for assistance with transferring infant if caregiver noted to have fall risk factors     Problem: Pain  Goal: Verbalizes/displays adequate comfort level or baseline comfort level  10/11/2022 1720 by Felicitas Peres RN  Outcome: Progressing  10/11/2022 0402 by Santo Davila RN  Outcome: Progressing  Flowsheets (Taken 10/11/2022 0402)  Verbalizes/displays adequate comfort level or baseline comfort level:   Encourage patient to monitor pain and request assistance   Assess pain using appropriate pain scale   Administer analgesics based on type and severity of pain and evaluate response   Implement non-pharmacological measures as appropriate and evaluate response   Consider cultural and social influences on pain and pain management   Notify Licensed Independent Practitioner if interventions unsuccessful or patient reports new pain     Problem: ABCDS Injury Assessment  Goal: Absence of physical injury  10/11/2022 1720 by Jose Antonio Boykin RN  Outcome: Progressing  Flowsheets (Taken 10/11/2022 1220)  Absence of Physical Injury: Implement safety measures based on patient assessment  10/11/2022 0402 by Letitia Mullins RN  Outcome: Progressing  Flowsheets (Taken 10/11/2022 0402)  Absence of Physical Injury: Implement safety measures based on patient assessment     Problem: Neurosensory - Adult  Goal: Achieves maximal functionality and self care  10/11/2022 1720 by Jose Antonio Boykin RN  Outcome: Progressing  10/11/2022 0402 by Letitia Mullins RN  Outcome: Progressing  Flowsheets (Taken 10/11/2022 0402)  Achieves maximal functionality and self care:   Monitor swallowing and airway patency with patient fatigue and changes in neurological status   Encourage and assist patient to increase activity and self care with guidance from physical therapy/occupational therapy   Encourage visually impaired, hearing impaired and aphasic patients to use assistive/communication devices     Problem: Skin/Tissue Integrity - Adult  Goal: Skin integrity remains intact  10/11/2022 1720 by Jose Antonio Boykin RN  Outcome: Progressing  Flowsheets  Taken 10/11/2022 1220  Skin Integrity Remains Intact: Monitor for areas of redness and/or skin breakdown  Taken 10/11/2022 0845  Skin Integrity Remains Intact: Monitor for areas of redness and/or skin breakdown  10/11/2022 0402 by Letitia Mullins RN  Outcome: Progressing  Flowsheets (Taken 10/11/2022 0402)  Skin Integrity Remains Intact:   Monitor for areas of redness and/or skin breakdown   Assess vascular access sites hourly     Problem: Skin/Tissue Integrity  Goal: Absence of new skin breakdown  Description: 1. Monitor for areas of redness and/or skin breakdown  2. Assess vascular access sites hourly  3. Every 4-6 hours minimum:  Change oxygen saturation probe site  4. Every 4-6 hours:  If on nasal continuous positive airway pressure, respiratory therapy assess nares and determine need for appliance change or resting period.   10/11/2022 1720 by Saul Olvera RN  Outcome: Progressing  10/11/2022 0402 by Bibi Verdugo RN  Outcome: Progressing  Note: monitoring     Problem: Nutrition Deficit:  Goal: Optimize nutritional status  10/11/2022 1720 by Saul Olvera RN  Outcome: Progressing  10/11/2022 0402 by Bibi Verdugo RN  Outcome: Progressing  Flowsheets (Taken 10/11/2022 0402)  Nutrient intake appropriate for improving, restoring, or maintaining nutritional needs:   Provide specific nutrition education to patient or family as appropriate   Order, calculate, and assess calorie counts as needed   Recommend appropriate diets, oral nutritional supplements, and vitamin/mineral supplements   Assess nutritional status and recommend course of action   Monitor oral intake, labs, and treatment plans   Recommend, monitor, and adjust tube feedings and TPN/PPN based on assessed needs

## 2022-10-11 NOTE — CARE COORDINATION
MARK reached out to Osman Lara from [facility] regarding pt pre-cert. SW awaiting response at this time. Addendum: Pre-cert is still pending at this time.

## 2022-10-12 LAB
ABSOLUTE EOS #: 1.73 K/UL (ref 0–0.4)
ABSOLUTE LYMPH #: 0.8 K/UL (ref 1–4.8)
ABSOLUTE MONO #: 1.2 K/UL (ref 0.1–1.3)
ANION GAP SERPL CALCULATED.3IONS-SCNC: 17 MMOL/L (ref 9–17)
BASOPHILS # BLD: 1 % (ref 0–2)
BASOPHILS ABSOLUTE: 0.13 K/UL (ref 0–0.2)
BUN BLDV-MCNC: 87 MG/DL (ref 8–23)
CALCIUM SERPL-MCNC: 9.5 MG/DL (ref 8.6–10.4)
CHLORIDE BLD-SCNC: 109 MMOL/L (ref 98–107)
CO2: 17 MMOL/L (ref 20–31)
CREAT SERPL-MCNC: 3.42 MG/DL (ref 0.7–1.2)
EOSINOPHILS RELATIVE PERCENT: 13 % (ref 0–4)
GFR SERPL CREATININE-BSD FRML MDRD: 18 ML/MIN/1.73M2
GLUCOSE BLD-MCNC: 103 MG/DL (ref 70–99)
HCT VFR BLD CALC: 39.3 % (ref 41–53)
HEMOGLOBIN: 13.2 G/DL (ref 13.5–17.5)
LYMPHOCYTES # BLD: 6 % (ref 24–44)
MCH RBC QN AUTO: 31.8 PG (ref 26–34)
MCHC RBC AUTO-ENTMCNC: 33.5 G/DL (ref 31–37)
MCV RBC AUTO: 95 FL (ref 80–100)
MONOCYTES # BLD: 9 % (ref 1–7)
MORPHOLOGY: NORMAL
PDW BLD-RTO: 14.3 % (ref 11.5–14.9)
PLATELET # BLD: 217 K/UL (ref 150–450)
PMV BLD AUTO: 9.5 FL (ref 6–12)
POTASSIUM SERPL-SCNC: 4.9 MMOL/L (ref 3.7–5.3)
RBC # BLD: 4.14 M/UL (ref 4.5–5.9)
SEG NEUTROPHILS: 71 % (ref 36–66)
SEGMENTED NEUTROPHILS ABSOLUTE COUNT: 9.44 K/UL (ref 1.3–9.1)
SODIUM BLD-SCNC: 143 MMOL/L (ref 135–144)
WBC # BLD: 13.3 K/UL (ref 3.5–11)

## 2022-10-12 PROCEDURE — 2580000003 HC RX 258: Performed by: INTERNAL MEDICINE

## 2022-10-12 PROCEDURE — 6360000002 HC RX W HCPCS: Performed by: INTERNAL MEDICINE

## 2022-10-12 PROCEDURE — 2580000003 HC RX 258: Performed by: FAMILY MEDICINE

## 2022-10-12 PROCEDURE — 1200000000 HC SEMI PRIVATE

## 2022-10-12 PROCEDURE — 97110 THERAPEUTIC EXERCISES: CPT

## 2022-10-12 PROCEDURE — 36415 COLL VENOUS BLD VENIPUNCTURE: CPT

## 2022-10-12 PROCEDURE — 6370000000 HC RX 637 (ALT 250 FOR IP): Performed by: FAMILY MEDICINE

## 2022-10-12 PROCEDURE — 97530 THERAPEUTIC ACTIVITIES: CPT

## 2022-10-12 PROCEDURE — 6370000000 HC RX 637 (ALT 250 FOR IP): Performed by: PSYCHIATRY & NEUROLOGY

## 2022-10-12 PROCEDURE — 85025 COMPLETE CBC W/AUTO DIFF WBC: CPT

## 2022-10-12 PROCEDURE — 99232 SBSQ HOSP IP/OBS MODERATE 35: CPT | Performed by: NURSE PRACTITIONER

## 2022-10-12 PROCEDURE — 80048 BASIC METABOLIC PNL TOTAL CA: CPT

## 2022-10-12 RX ORDER — DEXTROSE AND SODIUM CHLORIDE 5; .45 G/100ML; G/100ML
INJECTION, SOLUTION INTRAVENOUS CONTINUOUS
Status: DISCONTINUED | OUTPATIENT
Start: 2022-10-12 | End: 2022-10-19

## 2022-10-12 RX ADMIN — GALANTAMINE 8 MG: 4 TABLET, FILM COATED ORAL at 16:56

## 2022-10-12 RX ADMIN — GALANTAMINE 8 MG: 4 TABLET, FILM COATED ORAL at 08:07

## 2022-10-12 RX ADMIN — TAMSULOSIN HYDROCHLORIDE 0.4 MG: 0.4 CAPSULE ORAL at 08:06

## 2022-10-12 RX ADMIN — ASPIRIN 81 MG: 81 TABLET, COATED ORAL at 08:06

## 2022-10-12 RX ADMIN — ALLOPURINOL 300 MG: 300 TABLET ORAL at 08:06

## 2022-10-12 RX ADMIN — SODIUM CHLORIDE, PRESERVATIVE FREE 10 ML: 5 INJECTION INTRAVENOUS at 08:14

## 2022-10-12 RX ADMIN — QUETIAPINE FUMARATE 100 MG: 100 TABLET ORAL at 20:19

## 2022-10-12 RX ADMIN — DEXTROSE AND SODIUM CHLORIDE: 5; 450 INJECTION, SOLUTION INTRAVENOUS at 11:44

## 2022-10-12 RX ADMIN — Medication 9 MG: at 18:47

## 2022-10-12 RX ADMIN — SODIUM CHLORIDE, PRESERVATIVE FREE 10 ML: 5 INJECTION INTRAVENOUS at 20:27

## 2022-10-12 RX ADMIN — CARVEDILOL 6.25 MG: 6.25 TABLET, FILM COATED ORAL at 08:06

## 2022-10-12 RX ADMIN — CEFTRIAXONE SODIUM 1000 MG: 1 INJECTION, POWDER, FOR SOLUTION INTRAMUSCULAR; INTRAVENOUS at 03:02

## 2022-10-12 RX ADMIN — CARVEDILOL 6.25 MG: 6.25 TABLET, FILM COATED ORAL at 16:56

## 2022-10-12 NOTE — CARE COORDINATION
MARK followed up with Cynthia Mayers from [facility] regarding pre-cert. Cynthia Mayers informed SW that they are awaiting updated therapy notes to send to insurance. MARK spoke with Lashanda Roberts PTA regarding updated therapy notes.

## 2022-10-12 NOTE — CARE COORDINATION
MARK spoke with pt spouse at bedside regarding discharge plans. Keiko Sher confirmed that the plan is for pt to go to [facility]. MARK updated Keiko Sher that we are currently awaiting authorization from pt insurance. Keiko Sher understood.     Keiko Sher had no further needs from MARK.     IMM signed 10/12/2022 @ [phone]

## 2022-10-12 NOTE — PLAN OF CARE
Problem: Discharge Planning  Goal: Discharge to home or other facility with appropriate resources  Outcome: Progressing     Problem: Safety - Adult  Goal: Free from fall injury  Outcome: Progressing  Note: Patient remains free of falls and injuries throughout shift. Bed remains in the lowest position, wheels locked, call light and bedside table are within reach. Problem: Pain  Goal: Verbalizes/displays adequate comfort level or baseline comfort level  Outcome: Progressing  Note: Assess the location, characteristics, onset, duration, frequency, quality, and severity of pain. Encourage immediate report of pain. Use appropriate pain scale to rate pain. Manage pain using nonpharmacologic/pharmacologic interventions. Problem: ABCDS Injury Assessment  Goal: Absence of physical injury  Outcome: Progressing     Problem: Neurosensory - Adult  Goal: Achieves maximal functionality and self care  Outcome: Progressing     Problem: Skin/Tissue Integrity - Adult  Goal: Skin integrity remains intact  Outcome: Progressing     Problem: Skin/Tissue Integrity  Goal: Absence of new skin breakdown  Description: 1. Monitor for areas of redness and/or skin breakdown  2. Assess vascular access sites hourly  3. Every 4-6 hours minimum:  Change oxygen saturation probe site  4. Every 4-6 hours:  If on nasal continuous positive airway pressure, respiratory therapy assess nares and determine need for appliance change or resting period.   Outcome: Progressing     Problem: Nutrition Deficit:  Goal: Optimize nutritional status  Outcome: Progressing

## 2022-10-12 NOTE — PROGRESS NOTES
[address]    INPATIENT OCCUPATIONAL THERAPY  PROGRESS NOTE  Date: 10/12/2022  Patient Name: Cee Turner       Room:   MRN: [id number]    : 1945  (65 y.o.)  Gender: male   Referring Practitioner: Nemesio Parson MD  Diagnosis: AMS    Restrictions/Precautions  Restrictions/Precautions  Restrictions/Precautions: Fall Risk  Required Braces or Orthoses?: No  Implants present? :  (h/o cervical and lumbar sx, ankle sx)         Subjective  Subjective  Subjective: \"I'll give it a try\" pt referring to standing in cedrick palomino, stating non-sensical comments, confused and difficult to understand throughout session  Comments: co-tx with Vi Vásquez PTA    Objective  Orientation  Overall Orientation Status: Impaired  Orientation Level: Disoriented X4  Cognition  Overall Cognitive Status: Exceptions  Arousal/Alertness: Inconsistent responses to stimuli;Delayed responses to stimuli  Following Commands: Inconsistently follows commands  Attention Span: Difficulty attending to directions  Memory: Decreased recall of biographical Information;Decreased recall of precautions;Decreased recall of recent events;Decreased short term memory;Decreased long term memory  Safety Judgement: Decreased awareness of need for safety  Problem Solving: Assistance required to identify errors made  Insights: Not aware of deficits  Initiation: Requires cues for all  Sequencing: Requires cues for all  Cognition Comment: pt pleasantly confused, cooperative with increased time, fatigued  ADL  Feeding: Dependent/Total  Grooming: Dependent/Total  UE Bathing: Dependent/Total  LE Bathing: Dependent/Total  UE Dressing: Dependent/Total  LE Dressing: Dependent/Total  Toileting: Dependent/Total  Toileting Skilled Clinical Factors: incontinent of BM this date,  Additional Comments: ADL scores based on skilled observations and clinical reasoning unless otherwise noted.  Patient requires significant assistance with self-care due to cognitive status, significant weakness, inconsistency with following 1-step commands, and balance deficits         Balance  Balance  Sitting Balance: Maximum assistance (A x1 with noted R lateral lean and anterior lean, max cuing req for correction with poor carryover noted)  Standing Balance: Dependent/Total (Ax1-2, heavy anterior right lean with cuing for correction, poor carryover noted, cedrick stedy)  Standing Balance  Time: ~2 minutes, ~30 sec x4 with cedrick stedy  Activity: standing with cedrick stedy for transfers  Comment: heavy forward flex with cuing for correction, poor carryover noted    Transfers/Mobility  Bed mobility  Rolling to Right: Dependent/Total;2 Person assistance (max A x 2)  Supine to Sit: 2 Person assistance;Dependent/Total (max A x 2)  Sit to Supine: 2 Person assistance;Dependent/Total (max A x 2)  Scooting: Dependent/Total;2 Person assistance  Bed Mobility Comments: hand over hand to reach bed rail, unable to hold, max cuing req with poor carryover noted  Transfers  Sit to stand: 2 Person assistance;Maximum assistance  Stand to sit: 2 Person assistance;Maximum assistance  Transfer Comments: using cedrick stedy, heavy anterior lean noted      Patient Education  Patient Education  Education Given To: Patient  Education Provided: Role of Therapy, Plan of Care, Transfer Training, Precautions  Education Method: Verbal, Demonstration  Barriers to Learning: Cognition  Education Outcome: Continued education needed      Goals  Short Term Goals  Time Frame for Short Term Goals: By discharge  Short Term Goal 1: Pt will complete upper body dressing/bathing with min A and Good attention to task  Short Term Goal 2: Pt will complete simple grooming task/self feeding with SBA and min verbal cues  Short Term Goal 3: Pt will complete lower body dressing/bathing with Max A and Good attention to task  Short Term Goal 4: Pt will complete functional transfers/mobility with mod A and Good safety with use of least restrictive device  Short Term Goal 5: Pt will follow simple 1-2 step commands 75% of the time to increase participation in daily activities  Short Term Goal 6: Pt will participate in 15+ minutes of therapeutic exercies/functional activities to increase safety and independence with self care and mobility  Occupational Therapy Plan  Times Per Week: 4-6  Times Per Day:  Once a day  Current Treatment Recommendations: Self-Care / ADL, Strengthening, ROM, Balance training, Functional mobility training, Endurance training, Cognitive reorientation, Pain management, Safety education & training, Patient/Caregiver education & training, Equipment evaluation, education, & procurement, Cognitive/Perceptual training, Coordination training      Assessment  Activity Tolerance  Activity Tolerance: Treatment limited secondary to decreased cognition  Assessment  Performance deficits / Impairments: Decreased ADL status, Decreased functional mobility , Decreased strength, Decreased safe awareness, Decreased cognition, Decreased endurance, Decreased balance, Decreased high-level IADLs, Decreased fine motor control, Decreased coordination, Decreased posture  Assessment: pillows and wedge used to support pt while sitting in recliner, NSG made aware  Treatment Diagnosis: Impaired self care status  Prognosis: Fair  Decision Making: Medium Complexity  Discharge Recommendations: Patient would benefit from continued therapy after discharge  OT Equipment Recommendations  Other: TBD  Safety Devices  Type of Devices: Patient at risk for falls, Gait belt, Call light within reach, Left in bed      AM-PAC Daily Activities Inpatient  AM-PAC Daily Activity Inpatient   How much help for putting on and taking off regular lower body clothing?: Total  How much help for Bathing?: Total  How much help for Toileting?: Total  How much help for putting on and taking off regular upper body clothing?: Total  How much help for taking care of personal grooming?: Total  How much help for eating meals?: Total  AM-PAC Inpatient Daily Activity Raw Score: 6  AM-PAC Inpatient ADL T-Scale Score : 17.07  ADL Inpatient CMS 0-100% Score: 100  ADL Inpatient CMS G-Code Modifier : CN    OT Minutes  OT Individual Minutes  Time In: [address]  Time Out: [phone]  Minutes: [address] [place], [facility]

## 2022-10-12 NOTE — PROGRESS NOTES
Palliative Care Progress Note    NAME:  Jakub Lockwood RECORD NUMBER:  [id number]  AGE: 65 y.o. GENDER: male  : 1945  TODAY'S DATE:  10/12/2022    Reason for Consult:    Goals of care   Family support     Summary   Patient is a DNRCC-A no intubation  Patient waiting for precert for [facility] care   Patient wife Alona his primary decision maker       Plan      Palliative Interaction:  I went and saw patient whom was resting quietly without any distress. I left patient to continue to rest.    I called and spoke to Alona patient wife and provided her with emotional support and update. I informed her that we are still [facility]. Alona states she and friends from [facility] will be up to see patient around 1130 am today. Palliative care will continue to follow. Education/support to family  Education/support to patient  Discharge planning/helping to coordinate care  Communications with primary service  Pharmacologic pain management  Providing support for coping/adaptation/distress of family  Providing support for coping/adaptation/distress of patient  Discussing meaning/purpose   Caregiver support/education  Continue with current plan of care  Code status clarified: DNRCCA  Principle Problem/Diagnosis:  AMS (altered mental status)    Additional Assessments:  Principal Problem:    AMS (altered mental status)  Active Problems:    Acute encephalopathy    Memory loss    Cognitive impairment  Resolved Problems:    * No resolved hospital problems.  *     1- Symptom management/ pain control     Pain Assessment:  tylenol                Anxiety:   Ativan and Melontonin                          Dyspnea:   respirations relaxed                           Fatigue:   generalized weakness     Other:      2- Goals of care evaluation   The patient goals of care are improve or maintain function/quality of life   Goals of care discussed with:    [] Patient independently    [] Patient and Family    [x] Family or Healthcare DPOA independently    [] Unable to discuss with patient, family/DPOA not present    3- Code Status  DNR-CCA    4- Other recommendations  - We will continue to provide comfort and support to the patient and the family    Please call with any palliative questions or concerns. Palliative Care Team is available via perfect serve or via phone. History of Present Illness     The patient is a 65 y.o. Non- / non  male who presents with Altered Mental Status      Referred to Palliative Care by  [x] Physician   [] Nursing  [] Family Request   [] Other:       He was admitted to the Medical surgical service for Altered mental status, unspecified altered mental status type [R41.82]  AMS (altered mental status) [R41.82]. His hospital course has been associated with altered mental status . The patient has a complicated medical history and has been hospitalized since 10/3/2022 10:05 AM.    Patient found resting in bed without any signs of distress. Patient was treated for UTI but now Rocephin is completed. Patient has been working with PT on strengthening. Awaiting pre-certification for [facility] for discharge. Palliative care will continue to follow     OVERNIGHT EVENTS:  Patient is a DNRCC-A no intubation   Patient with confusion   Awaiting acceptance at [address] [employer]   BP (!) 177/83   Pulse 68   Temp 98.7 °F (37.1 °C) (Oral)   Resp 16   Ht 5' 11\" (1.803 m)   Wt 206 lb (93.4 kg)   SpO2 98%   BMI 28.73 kg/m²     No data found.      Lab Results   Component Value Date    WBC 13.3 (H) 10/12/2022    HGB 13.2 (L) 10/12/2022    HCT 39.3 (L) 10/12/2022    MCV 95.0 10/12/2022     10/12/2022   ,  Lab Results   Component Value Date/Time     10/12/2022 06:22 AM    K 4.9 10/12/2022 06:22 AM     10/12/2022 06:22 AM    CO2 17 10/12/2022 06:22 AM    BUN 87 10/12/2022 06:22 AM    CREATININE 3.42 10/12/2022 06:22 AM    GLUCOSE 103 10/12/2022 06:22 AM    CALCIUM 9.5 10/12/2022 06:22 AM      Lab Results   Component Value Date/Time    MG 2.0 10/03/2022 10:44 AM    ,  Lab Results   Component Value Date    CALCIUM 9.5 10/12/2022    PHOS 3.9 08/17/2022        Xray Result (most recent):  XR CHEST PORTABLE 10/07/2022    Narrative  EXAMINATION:  ONE XRAY VIEW OF THE CHEST    10/7/2022 4:21 pm    COMPARISON:  06/22/2021. HISTORY:  ORDERING SYSTEM PROVIDED HISTORY: fever  TECHNOLOGIST PROVIDED HISTORY:  fever  Reason for Exam: Fever. Pt. unable to sit up straight for x-ray    FINDINGS:  The patient is tilted and rotated significantly to the right. No confluent  airspace consolidation or effusion is identified. The cardiac silhouette and  mediastinal contour are difficult to evaluate due to patient positioning, but  cardiac silhouette size is favored to be within normal limits. Impression  Limited study due to patient positioning with no confluent airspace  consolidation identified. MRI Result (most recent):  No results found for this or any previous visit from the past 3650 days. PAST MEDICAL HISTORY  Past Medical History:   Diagnosis Date    Arthritis     Aspirin long-term use     Back pain     Chronic fatigue 10/7/2020    Chronic kidney disease     CKD (chronic kidney disease), stage III ([facility] [address] 8/9/2017    Baseline creatinine 1.4-1.5. Proteinuria reported by primary physician. Workup in progress. Essential hypertension 8/9/2017    Fall at home 12/09/2019    Family history of prostate problems     Heart disease     Hiatal hernia     HTN (hypertension)     Hyperlipemia     Interstitial nephritis chronic 9/28/2020    Kidney bx 9/25/20 shows chronic interstitial nephritis with hypertensive changes. Likely cause NSAIDs     Isolated proteinuria 8/9/2017    Nonnephrotic, being quantified. Kidney disease, chronic, stage IV (severe, EGFR 15-29 ml/min) ([facility]) 8/9/2017    Baseline creatinine 1.4-1.5. Proteinuria reported by primary physician. Likely from long term NSAID use. Workup for immune complex, paraprotein disease negative. Proteinuria fluctuates between 1-2 g.  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    Appears to be primary    MVP (mitral valve prolapse)     Neck pain     Nephrotic syndrome 10/7/2020    NSAID long-term use 8/9/2017    Primary osteoarthritis of hand 8/9/2017    Urination, excessive at night     Vascular dementia without behavioral disturbance ([facility] [address] 9/7/2022        SURGICAL HISTORY  Past Surgical History:   Procedure Laterality Date    ANKLE SURGERY      COLONOSCOPY      CT BIOPSY RENAL  9/22/2020    CT BIOPSY RENAL 9/22/2020 [facility] SPECIAL PROCEDURES    INGUINAL HERNIA REPAIR  12/1/08    [facility]    LUMBAR DISC SURGERY      NECK SURGERY      SPINE SURGERY      TONSILLECTOMY AND ADENOIDECTOMY          FAMILY HISTORY  Family History   Family history unknown: Yes        SOCIAL HISTORY  Social History       Tobacco History       Smoking Status  Former Smoking Start Date  3/17/1960 Quit Date  3/17/1975 Smoking Frequency  0.50 packs/day for 15.00 years (7.50 pk-yrs)    Smoking Tobacco Type  Cigarettes from 3/17/1960 to 3/17/1975      Smokeless Tobacco Use  Never              Alcohol History       Alcohol Use Status  No              Drug Use       Drug Use Status  No              Sexual Activity       Sexually Active  Not Asked                     Assessment        REVIEW OF SYSTEMS    []   UNABLE TO OBTAIN:     Constitutional:  []   Chills   [x]  Fatigue   []  Fevers   [x]  Malaise   []  Weight loss   [] Other:     Respiratory:   []  Cough    []  Shortness of breath    []  Chest pain    [x] Other: respirations relaxed     Cardiovascular:   []  Chest pain  []  Dyspnea    []  Exertional chest pressure/discomfort     [] Fatigue      []  Palpitations    []  Syncope   [x] Other: no complaints     Gastrointestinal:   []  Abdominal pain   []  Constipation    []  Diarrhea    []   Dysphagia   []  Reflux             []  Vomiting   [x] Other: no nausea and vomiting     Genitourinary:  []  Dysuria     []  Frequency   []  Hematuria   [] Nocturia   []  Urinary incontinence   [] Other:     Musculoskeletal:   [] Back pain    [x]  Muscle weakness   [x]  Myalgias    []  Neck pain   []  Stiff joints   []  Other:     Behavioral/Psych:   [] Anxiety    []  Depression     []  Mood swings   [x] Other: patient resting     PHYSICAL ASSESSMENT:     General: []  Oriented x3      [] well appearing      [] Intubated      [] ill appearing      [x] Other:Confusion     Mental Status: [] normal mental status exam      [] drowsy      [x] Confused      [] Other:     Cardiovascular: [x]  Regular rate/rhythm      [] Arrhythmia      [] Other:     Chest: [] Effort normal      [] lungs clear      [] respiratory distress      [] Tachypnea      []  Other:respirations relaxed     Abdomen: [x] Soft/non-tender      []  Normal appearance      [] Distended      [] Ascites      [] Other:    Neurological: [] Normal Speech      [] Normal Sensation      []  Deficits present:      Extremity:  [x] normal skin color/temp      [] clubbing/cyanosis      []  No edema      [] Other:     Palliative Performance Scale:  ___60%  Ambulation reduced; Significant disease; Can't do hobbies/housework; intake normal or reduced; occasional assist; LOC full/confusion  ___50%  Mainly sit/lie; Extensive disease; Can't do any work; Considerable assist; intake normal or reduced; LOC full/confusion  _x__40%  Mainly in bed; Extensive disease; Mainly assist; intake normal or reduced; LOC full/confusion   ___30%  Bed Bound; Extensive disease; Total care; intake reduced; LOCfull/confusion  ___20%  Bed Bound; Extensive disease;  Total care; intake minimal; Drowsy/coma  ___10% Bed Bound; Extensive disease; Total care; Mouth care only; Drowsy/coma  ___0       Death            Palliative Care will continue to follow Mr. Juany Rojas care as needed. The note has been dictated by dragon, typing errors may be a possibility     Thank you for allowing Palliative Care to participate in the care of Mr. Shaista Vargas . Electronically signed by   MORA Giles NP  Palliative Care Team  on 10/12/2022 at 10:41 AM    [address] Number [phone]    [address] Number [phone] [address] Number [phone]    Please call with any palliative questions or concerns. Palliative Care Team is available via perfect serve or via phone.

## 2022-10-12 NOTE — PROGRESS NOTES
Physical Therapy  Facility/Department: [facility] MED SURG  Daily Treatment Note  NAME: Ginny Contreras  : 1945  MRN: [id number]    Date of Service: 10/12/2022    Discharge Recommendations:  Patient would benefit from continued therapy after discharge        Patient Diagnosis(es): The encounter diagnosis was Altered mental status, unspecified altered mental status type. Assessment   Activity Tolerance: Treatment limited secondary to decreased cognition     Plan    Physcial Therapy Plan  General Plan: 3-5 times per week  Current Treatment Recommendations: Strengthening;Balance training;Functional mobility training;Transfer training;Gait training; Endurance training;Cognitive/Perceptual training; Safety education & training;Positioning;Cognitive reorientation;Patient/Caregiver education & training;Equipment evaluation, education, & procurement; Therapeutic activities     Restrictions  Restrictions/Precautions  Restrictions/Precautions: Fall Risk  Required Braces or Orthoses?: No  Implants present? :  (h/o cervical and lumbar sx, ankle sx)  Position Activity Restriction  Other position/activity restrictions: up with assist, 1:1 sitter, bed alarm, personal alarm when in chair     Subjective    Subjective  Subjective: Pt is asleep in bed upon arrival. Pt is agreeable to PT/OT however not sure how much patient understands. Co-treat with Rivka Medrano.   Pain: Denies Pain  Orientation  Overall Orientation Status: Impaired  Orientation Level: Disoriented X4 (pt does not respond when asked)  Cognition  Overall Cognitive Status: Exceptions  Arousal/Alertness: Inconsistent responses to stimuli;Delayed responses to stimuli  Following Commands: Inconsistently follows commands  Attention Span: Difficulty attending to directions  Memory: Decreased recall of biographical Information;Decreased recall of precautions;Decreased recall of recent events;Decreased short term memory;Decreased long term memory  Safety Judgement: Decreased awareness of need for safety  Problem Solving: Assistance required to identify errors made  Insights: Not aware of deficits  Initiation: Requires cues for all  Sequencing: Requires cues for all  Cognition Comment: pt pleasantly confused, cooperative with increased time, fatigued     Objective   Vitals     Bed Mobility Training  Bed Mobility Training: Yes  Interventions: Safety awareness training;Verbal cues; Tactile cues  Rolling: Maximum assistance;Assist X2;Additional time  Supine to Sit: Maximum assistance;Assist X2;Additional time; Adaptive equipment  Sit to Supine: Maximum assistance;Assist X2;Additional time; Adaptive equipment  Scooting: Maximum assistance;Assist X2;Additional time (to EOB)  Balance  Sitting: Impaired  Sitting - Static: Poor (constant support) (Started as Max Ax2 improved to Kaylee briefly, heavy right lateral lean)  Sitting - Dynamic: Poor (constant support)  Standing: Impaired  Standing - Static: Poor (Standing with Bobbi Carmen, bed elevated to assist with stand)  Standing - Dynamic: Not tested  Transfer Training  Transfer Training: Yes  Interventions: Safety awareness training; Tactile cues  Sit to Stand: Maximum assistance;Assist X2;Additional time; Adaptive equipment (standing in Bobbi Carmen with bed elevated. Unable to complete with erect posture.)  Stand to Sit: Maximum assistance;Assist X2;Additional time; Adaptive equipment Bobbi Carmen)  Gait Training  Gait Training: No (Not safe at this time)     PT Exercises  Exercise Treatment: Bed Mobility with repositioning and off loading to prevent pressure wound on coccyx  A/AROM Exercises: Supine bilat LE exercises, P/AAROM inconsistently participating  Static Sitting Balance Exercises: Dangle EOB 15-20 min.   Dynamic Sitting Balance Exercises: Seated Reaching out of base of support  Static Standing Balance Exercises: Standing x4 in cedrick stedy, standing tolerance 2min, <30 sec x3     Safety Devices  Type of Devices: Patient at risk for falls;Gait belt;Call light within reach; Left in bed     [facility]   How much difficulty turning over in bed?: Unable  How much difficulty sitting down on / standing up from a chair with arms?: Unable  How much difficulty moving from lying on back to sitting on side of bed?: Unable  How much help from another person moving to and from a bed to a chair?: Total  How much help from another person needed to walk in hospital room?: Total  How much help from another person for climbing 3-5 steps with a railing?: Total  AM-PAC Inpatient Mobility Raw Score : 6  AM-PAC Inpatient T-Scale Score : 23.55  Mobility Inpatient CMS 0-100% Score: 100  Mobility Inpatient CMS G-Code Modifier : CN      Goals  Short Term Goals  Time Frame for Short Term Goals: 7 days  Short Term Goal 1: Pt will increase Bilat LE strength to 4-/5 to maximize mobiltiy  Short Term Goal 2: Pt will perform bed mobiltiy Mod A  Short Term Goal 3: Pt will perform transfers with RW Mod A  Short Term Goal 4: Pt will ambulate with RW 50 ft Mod A  Additional Goals?: No  Patient Goals   Patient Goals : unable to state    Education  Patient Education  Education Given To: Patient;Staff;Caregiver (JUVENTINO Dozier Updated)  Education Provided: Precautions;Transfer Training; Fall Prevention Strategies  Education Method: Demonstration;Verbal  Barriers to Learning: Cognition  Education Outcome: Continued education needed    Therapy Time   Individual Concurrent Group Co-treatment   Time In 2908         Time Out 1602         Minutes [place], [place]

## 2022-10-13 LAB
ABSOLUTE EOS #: 1.84 K/UL (ref 0–0.4)
ABSOLUTE LYMPH #: 1.04 K/UL (ref 1–4.8)
ABSOLUTE MONO #: 0.58 K/UL (ref 0.1–1.3)
ANION GAP SERPL CALCULATED.3IONS-SCNC: 13 MMOL/L (ref 9–17)
ATYPICAL LYMPHOCYTE ABSOLUTE COUNT: 0.12 K/UL
ATYPICAL LYMPHOCYTES: 1 %
BASOPHILS # BLD: 0 % (ref 0–2)
BASOPHILS ABSOLUTE: 0 K/UL (ref 0–0.2)
BUN BLDV-MCNC: 84 MG/DL (ref 8–23)
CALCIUM SERPL-MCNC: 9.3 MG/DL (ref 8.6–10.4)
CHLORIDE BLD-SCNC: 109 MMOL/L (ref 98–107)
CO2: 20 MMOL/L (ref 20–31)
CREAT SERPL-MCNC: 3.16 MG/DL (ref 0.7–1.2)
EOSINOPHILS RELATIVE PERCENT: 16 % (ref 0–4)
GFR SERPL CREATININE-BSD FRML MDRD: 19 ML/MIN/1.73M2
GLUCOSE BLD-MCNC: 130 MG/DL (ref 70–99)
HCT VFR BLD CALC: 39.1 % (ref 41–53)
HEMOGLOBIN: 12.7 G/DL (ref 13.5–17.5)
LYMPHOCYTES # BLD: 9 % (ref 24–44)
MCH RBC QN AUTO: 30.9 PG (ref 26–34)
MCHC RBC AUTO-ENTMCNC: 32.4 G/DL (ref 31–37)
MCV RBC AUTO: 95.5 FL (ref 80–100)
MONOCYTES # BLD: 5 % (ref 1–7)
MORPHOLOGY: ABNORMAL
PDW BLD-RTO: 14.5 % (ref 11.5–14.9)
PLATELET # BLD: 228 K/UL (ref 150–450)
PMV BLD AUTO: 9.6 FL (ref 6–12)
POTASSIUM SERPL-SCNC: 4.2 MMOL/L (ref 3.7–5.3)
RBC # BLD: 4.1 M/UL (ref 4.5–5.9)
SEG NEUTROPHILS: 69 % (ref 36–66)
SEGMENTED NEUTROPHILS ABSOLUTE COUNT: 7.92 K/UL (ref 1.3–9.1)
SODIUM BLD-SCNC: 142 MMOL/L (ref 135–144)
WBC # BLD: 11.5 K/UL (ref 3.5–11)

## 2022-10-13 PROCEDURE — 36415 COLL VENOUS BLD VENIPUNCTURE: CPT

## 2022-10-13 PROCEDURE — 85025 COMPLETE CBC W/AUTO DIFF WBC: CPT

## 2022-10-13 PROCEDURE — 1200000000 HC SEMI PRIVATE

## 2022-10-13 PROCEDURE — 80048 BASIC METABOLIC PNL TOTAL CA: CPT

## 2022-10-13 PROCEDURE — 2580000003 HC RX 258: Performed by: FAMILY MEDICINE

## 2022-10-13 PROCEDURE — 6370000000 HC RX 637 (ALT 250 FOR IP): Performed by: FAMILY MEDICINE

## 2022-10-13 PROCEDURE — 6370000000 HC RX 637 (ALT 250 FOR IP): Performed by: PSYCHIATRY & NEUROLOGY

## 2022-10-13 RX ADMIN — QUETIAPINE FUMARATE 100 MG: 100 TABLET ORAL at 20:28

## 2022-10-13 RX ADMIN — DEXTROSE AND SODIUM CHLORIDE: 5; 450 INJECTION, SOLUTION INTRAVENOUS at 14:09

## 2022-10-13 RX ADMIN — Medication 9 MG: at 18:29

## 2022-10-13 RX ADMIN — TAMSULOSIN HYDROCHLORIDE 0.4 MG: 0.4 CAPSULE ORAL at 09:02

## 2022-10-13 RX ADMIN — CARVEDILOL 6.25 MG: 6.25 TABLET, FILM COATED ORAL at 09:02

## 2022-10-13 RX ADMIN — ASPIRIN 81 MG: 81 TABLET, COATED ORAL at 09:02

## 2022-10-13 RX ADMIN — GALANTAMINE 8 MG: 4 TABLET, FILM COATED ORAL at 18:29

## 2022-10-13 RX ADMIN — ALLOPURINOL 300 MG: 300 TABLET ORAL at 09:02

## 2022-10-13 RX ADMIN — GALANTAMINE 8 MG: 4 TABLET, FILM COATED ORAL at 09:02

## 2022-10-13 RX ADMIN — SODIUM CHLORIDE, PRESERVATIVE FREE 5 ML: 5 INJECTION INTRAVENOUS at 20:59

## 2022-10-13 RX ADMIN — CARVEDILOL 6.25 MG: 6.25 TABLET, FILM COATED ORAL at 18:28

## 2022-10-13 RX ADMIN — DEXTROSE AND SODIUM CHLORIDE: 5; 450 INJECTION, SOLUTION INTRAVENOUS at 00:41

## 2022-10-13 RX ADMIN — SODIUM CHLORIDE, PRESERVATIVE FREE 10 ML: 5 INJECTION INTRAVENOUS at 09:13

## 2022-10-13 NOTE — PROGRESS NOTES
10/13/22 1128   Encounter Summary   Encounter Overview/Reason  Spiritual/Emotional Needs   Service Provided For: Patient   Referral/Consult From: Palliative Care   Last Encounter  10/13/22   Complexity of Encounter Low   Spiritual/Emotional needs   Type Spiritual Support   Palliative Care   Type Palliative Care, Follow-up   Assessment/Intervention/Outcome   Assessment Unable to assess   Intervention Prayer (assurance of)/[place]

## 2022-10-13 NOTE — PROGRESS NOTES
Hospitalist Progress Note  10/13/2022 4:59 PM  Subjective:   Admit Date: 10/3/2022  PCP: Conchis Rosario MD     DNR-CCA      C/c:  Chief Complaint   Patient presents with    Altered Mental Status         Interval History: pt resting quietly, cr still high, iv fluids,    Diet: ADULT DIET; Regular  ADULT ORAL NUTRITION SUPPLEMENT; Breakfast, Lunch, Dinner; Standard High Calorie/High Protein Oral Supplement                                ip days:10  Medications:   Scheduled Meds:   carvedilol  6.25 mg Oral BID WC    allopurinol  300 mg Oral Daily    aspirin  81 mg Oral Daily    melatonin  9 mg Oral QPM    [Held by provider] spironolactone  25 mg Oral Daily    tamsulosin  0.4 mg Oral Daily    QUEtiapine  100 mg Oral Nightly    galantamine  8 mg Oral BID WC    sodium chloride flush  5-40 mL IntraVENous 2 times per day    [Held by provider] enoxaparin  30 mg SubCUTAneous Daily     Continuous Infusions:   dextrose 5 % and 0.45 % NaCl 75 mL/hr at 10/13/22 1409    sodium chloride Stopped (10/08/22 0132)     PRN Meds:. LORazepam, haloperidol lactate, sodium chloride flush, sodium chloride, acetaminophen, ondansetron **OR** ondansetron     CBC:   Recent Labs     10/11/22  0550 10/12/22  0622 10/13/22  0617   WBC 12.5* 13.3* 11.5*   HGB 13.1* 13.2* 12.7*    217 228     BMP:    Recent Labs     10/11/22  0550 10/12/22  0622 10/13/22  0617    143 142   K 4.8 4.9 4.2   * 109* 109*   CO2 19* 17* 20   BUN 83* 87* 84*   CREATININE 3.55* 3.42* 3.16*   GLUCOSE 108* 103* 130*     Hepatic: No results for input(s): AST, ALT, ALB, BILITOT, ALKPHOS in the last 72 hours. Troponin: No results for input(s): TROPONINI in the last 72 hours. BNP: No results for input(s): BNP in the last 72 hours. Lipids: No results for input(s): CHOL, HDL in the last 72 hours. Invalid input(s): LDLCALCU  INR: No results for input(s): INR in the last 72 hours.     Objective:   Vitals: BP (!) 151/81   Pulse 64   Temp 97.6 °F (36.4 °C) (Axillary)   Resp 18   Ht 5' 11\" (1.803 m)   Wt 206 lb (93.4 kg)   SpO2 96%   BMI 28.73 kg/m²   General appearance: alert, appears stated age and cooperative  Skin: Skin color, texture, turgor normal. No rashes or lesions  Lungs: clear to auscultation bilaterally  Heart: regular rate and rhythm, S1, S2 normal, no murmur, click, rub or gallop  Abdomen: soft, non-tender; bowel sounds normal; no masses,  no organomegaly  Extremities: extremities normal, atraumatic, no cyanosis or edema  Neurologic: Mental status: Alert, oriented, thought content appropriate    Prophylaxis:   DVT with  [] lovenox        [] heparin        [] Scd        [x] none:     Radiology:  CT HEAD WO CONTRAST    Result Date: 10/3/2022  EXAMINATION: CT OF THE HEAD WITHOUT CONTRAST  10/3/2022 11:27 am TECHNIQUE: CT of the head was performed without the administration of intravenous contrast. Automated exposure control, iterative reconstruction, and/or weight based adjustment of the mA/kV was utilized to reduce the radiation dose to as low as reasonably achievable. COMPARISON: 01/21/2021 HISTORY: ORDERING SYSTEM PROVIDED HISTORY: Mental Status Changes TECHNOLOGIST PROVIDED HISTORY: Mental Status Changes Decision Support Exception - unselect if not a suspected or confirmed emergency medical condition->Emergency Medical Condition (MA) Reason for Exam: AMS. FINDINGS: BRAIN/VENTRICLES: There is no acute intracranial hemorrhage, mass effect or midline shift. No abnormal extra-axial fluid collection. The gray-white differentiation is maintained without evidence of an acute infarct. There is no evidence of hydrocephalus. ORBITS: The visualized portion of the orbits demonstrate no acute abnormality. SINUSES: There is mild mucosal thickening in the right frontal, ethmoid, and bilateral sphenoid sinuses. Mild mucosal thickening in the right maxillary sinus. There are bubbly secretions in the posterior right ethmoid sinuses. No air-fluid level.   Mastoid air cells are aerated. SOFT TISSUES/SKULL:  No acute abnormality of the visualized skull or soft tissues. No acute intracranial abnormality. VL Carotid Bilateral    Result Date: 10/5/2022    [facility]  Vascular Carotid Procedure   Patient Name Arik Rodriguez Date of Study           10/04/2022               O   Date of      1945  Gender                  Male  Birth   Age          65 year(s)  Race                       Room Number  2069   Corporate ID [id number]  #   Patient Leatha Hashimoto [de-identified]  #   MR #         [id number]      Radha Morgan   Accession #  [id number]  Interpreting Physician  Cesario Ramirez   Referring                Referring Physician     Mari Alexander  Nurse  Practitioner  Procedure Type of Study:   Cerebral: Carotid, Carotid Scan Bilateral.  Indications for Study:Neck distention and Carotid stenosis. Patient Status: In Patient. Technical Quality:Adequate visualization. Conclusions   Summary   Bilateral:  There is plaque at the level of the carotid bifurcation with a velocity  profile consistent with no significant stenosis . The vertebral arteries  are patent with antegrade flow. Signature   ----------------------------------------------------------------  Electronically signed by Raffy Velasquez(Sonographer) on  10/04/2022 11:36 AM  ----------------------------------------------------------------   ----------------------------------------------------------------  Electronically signed by Cesario Ramirez(Interpreting physician)  on 10/05/2022 06:29 AM  ----------------------------------------------------------------  Findings:   Right Impression:                    Left Impression:  The common carotid artery is normal. The common carotid artery is normal.   The carotid bulb is normal.          The carotid bulb is normal.   The external carotid artery has a    The external carotid artery has a  smooth calcific plaque.               smooth calcific plaque. The internal carotid artery has a    The internal carotid artery has a  smooth calcific plaque causing a     smooth calcific plaque causing a <50%  <50% stenosis based on velocities. stenosis based on velocities. The vertebral artery is patent with  The vertebral artery is patent with  antegrade flow. antegrade flow. Velocities are measured in cm/s ; Diameters are measured in cm Carotid Right Measurements +------------+-------+------+--------+-------+------------+----------------+ ! Location    ! PSV    ! EDV   ! Angle   ! RI     !%Stenosis   ! Tortuosity      ! +------------+-------+------+--------+-------+------------+----------------+ ! Prox CCA    !59.6   ! 10    !        !0.83   !            !                ! +------------+-------+------+--------+-------+------------+----------------+ ! Mid CCA     !52.2   ! 10    !        !0.81   !            !                ! +------------+-------+------+--------+-------+------------+----------------+ ! Dist CCA    !52.2   !11    !        !0.79   !            !                ! +------------+-------+------+--------+-------+------------+----------------+ ! Bulb        !44.7   !11    !        !0.75   !            !                ! +------------+-------+------+--------+-------+------------+----------------+ ! Prox ICA    !37.3   !8     !        !0.79   !            !                ! +------------+-------+------+--------+-------+------------+----------------+ ! Mid ICA     !71     !18    !        !0.74   !            !                ! +------------+-------+------+--------+-------+------------+----------------+ ! Dist ICA    !74.6   !20    !        !0.73   !            !                ! +------------+-------+------+--------+-------+------------+----------------+ ! Prox ECA    !52.8   !5     !        !0.91   !            !                ! +------------+-------+------+--------+-------+------------+----------------+ ! Vertebral   !31.1   !0     !        !1 !            !                ! +------------+-------+------+--------+-------+------------+----------------+   - Additional Measurements:ICAPSV/CCAPSV 1.25. ICAEDV/CCAEDV 2. Carotid Left Measurements +------------+-------+------+--------+-------+------------+----------------+ ! Location    ! PSV    ! EDV   ! Angle   ! RI     !%Stenosis   ! Tortuosity      ! +------------+-------+------+--------+-------+------------+----------------+ ! Prox CCA    !68.3   !13    !        !0.81   !            !                ! +------------+-------+------+--------+-------+------------+----------------+ ! Mid CCA     !82.6   !14    !        !0.83   !            !                ! +------------+-------+------+--------+-------+------------+----------------+ ! Dist CCA    !84.5   !17    !        !0.8    ! !                ! +------------+-------+------+--------+-------+------------+----------------+ ! Bulb        !47.8   ! 10    !        !0.79   !            !                ! +------------+-------+------+--------+-------+------------+----------------+ ! Prox ICA    !75.2   ! 19    !        !0.75   !            !                ! +------------+-------+------+--------+-------+------------+----------------+ ! Mid ICA     !104    !32    !        !0.69   !            !                ! +------------+-------+------+--------+-------+------------+----------------+ ! Dist ICA    ! 97.5   !26    !        !0.73   !            !                ! +------------+-------+------+--------+-------+------------+----------------+ ! Prox ECA    !72.7   !12    !        !0.83   !            !                ! +------------+-------+------+--------+-------+------------+----------------+ ! Vertebral   !34.2   !6     !        !0.82   !            !                ! +------------+-------+------+--------+-------+------------+----------------+   - Additional Measurements:ICAPSV/CCAPSV 1.52. ICAEDV/CCAEDV 2.46. Assessment :   Dementia/stable  ckd     Plan:   1. Continue present plan  2. Nephro to see    Patient Active Problem List:     History of left inguinal hernia     Kidney disease, chronic, stage IV (severe, EGFR 15-29 ml/min) (HCC)     Isolated proteinuria     NSAID long-term use     Essential hypertension     Primary osteoarthritis of hand     Mitral valve prolapse     Interstitial nephritis chronic     MPGN (membranoproliferative glomerulonephritis), type 1     Nephrotic syndrome     Weakness     MILAGRO (acute kidney injury) ([address]     Urinary incontinence     Gait disturbance     BPH (benign prostatic hyperplasia)     Vascular dementia without behavioral disturbance ([address]     AMS (altered mental status)     Acute encephalopathy     Memory loss     Cognitive impairment      Anticipated Disposition upon discharge: [] Home                                                                         [] Home with Home Health                                                                         [] [facility]                                                                         [] [address]      Patient is admitted as inpatient status because of co-morbidities listed above, severity of signs and symptoms as outlined, requirement for current medical therapies and most importantly because of direct risk to patient if care not provided in a hospital setting.           Renetta Ramsay MD, MD  Rounding Hospitalist

## 2022-10-13 NOTE — CONSULTS
NEPHROLOGY CONSULT     Patient :  Justine Ramirez; 65 y.o. MRN# [id number]  Location:  2069/2069-01  Attending:  Renetta Ramsay MD  Admit Date:  10/3/2022   Hospital Day: 10      Reason for Consult:   acute kidney injury on CKD      Chief Complaint:   change in mental status , combative behavior  History Obtained From:  spouse    History of Present Illness: This is a 65 y.o. 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 Keith Sanchez with baseline serum creatinine 1.9-2.2 mg/dL. Patient presented to the hospital  on 10/03/2022 with complains of change in mental status more confusion and combative behavior. On admission serum creatinine was noted to be 2.4 mg/dL which  peaked to 3.5 mg/dL on 10/11/2022. Patient has not been eating or drinking much poor oral intake and appetite   Patient's blood pressure has been stable   CT head no acute intracranial abnormality   Chest x-ray no confluent airspace consolidation    Past Medical History:        Diagnosis Date    Arthritis     Aspirin long-term use     Back pain     Chronic fatigue 10/7/2020    Chronic kidney disease     CKD (chronic kidney disease), stage III ([address] 8/9/2017    Baseline creatinine 1.4-1.5. Proteinuria reported by primary physician. Workup in progress. Essential hypertension 8/9/2017    Fall at home 12/09/2019    Family history of prostate problems     Heart disease     Hiatal hernia     HTN (hypertension)     Hyperlipemia     Interstitial nephritis chronic 9/28/2020    Kidney bx 9/25/20 shows chronic interstitial nephritis with hypertensive changes. Likely cause NSAIDs     Isolated proteinuria 8/9/2017    Nonnephrotic, being quantified. Kidney disease, chronic, stage IV (severe, EGFR 15-29 ml/min) ([facility]) 8/9/2017    Baseline creatinine 1.4-1.5. Proteinuria reported by primary physician. Likely from long term NSAID use.  Workup for immune complex, paraprotein disease negative. Proteinuria fluctuates between 1-2 g. 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    Appears to be primary    MVP (mitral valve prolapse)     Neck pain     Nephrotic syndrome 10/7/2020    NSAID long-term use 8/9/2017    Primary osteoarthritis of hand 8/9/2017    Urination, excessive at night     Vascular dementia without behavioral disturbance ([address] 9/7/2022       Past Surgical History:        Procedure Laterality Date    ANKLE SURGERY      COLONOSCOPY      CT BIOPSY RENAL  9/22/2020    CT BIOPSY RENAL 9/22/2020 [facility] PROCEDURES    INGUINAL HERNIA REPAIR  12/1/08    [facility]    LUMBAR DISC SURGERY      NECK SURGERY      SPINE SURGERY      TONSILLECTOMY AND ADENOIDECTOMY         Current Medications:    dextrose 5 % and 0.45 % sodium chloride infusion, Continuous  carvedilol (COREG) tablet 6.25 mg, BID WC  LORazepam (ATIVAN) injection 0.5 mg, Q10 Min PRN  allopurinol (ZYLOPRIM) tablet 300 mg, Daily  aspirin EC tablet 81 mg, Daily  melatonin tablet 9 mg, QPM  [Held by provider] spironolactone (ALDACTONE) tablet 25 mg, Daily  tamsulosin (FLOMAX) capsule 0.4 mg, Daily  haloperidol lactate (HALDOL) injection 5 mg, Q8H PRN  QUEtiapine (SEROQUEL) tablet 100 mg, Nightly  galantamine (RAZADYNE) tablet 8 mg, BID WC  sodium chloride flush 0.9 % injection 5-40 mL, 2 times per day  sodium chloride flush 0.9 % injection 5-40 mL, PRN  0.9 % sodium chloride infusion, PRN  [Held by provider] enoxaparin Sodium (LOVENOX) injection 30 mg, Daily  acetaminophen (TYLENOL) tablet 650 mg, Q4H PRN  ondansetron (ZOFRAN-ODT) disintegrating tablet 4 mg, Q8H PRN   Or  ondansetron (ZOFRAN) injection 4 mg, Q6H PRN        Allergies:   Other    Social History: Social History     Socioeconomic History    Marital status:      Spouse name: Not on file    Number of children: Not on file    Years of education: Not on file    Highest education level: Not on file   Occupational History    Not on file   Tobacco Use    Smoking status: Former     Packs/day: 0.50     Years: 15.00     Pack years: 7.50     Types: Cigarettes     Start date: 3/17/1960     Quit date: 3/17/1975     Years since quittin.6    Smokeless tobacco: Never   Vaping Use    Vaping Use: Never used   Substance and Sexual Activity    Alcohol use: No    Drug use: No    Sexual activity: Not on file   Other Topics Concern    Not on file   Social History Narrative    Not on file     Social Determinants of Health     Financial Resource Strain: Low Risk     Difficulty of Paying Living Expenses: Not hard at all   Food Insecurity: No Food Insecurity    Worried About Running Out of Food in the Last Year: Never true    Ran Out of Food in the Last Year: Never true   Transportation Needs: Not on file   Physical Activity: Not on file   Stress: Not on file   Social Connections: Not on file   Intimate Partner Violence: Not on file   Housing Stability: Not on file       Family History:   Family History   Family history unknown: Yes       Review of Systems:    unobtainable      Objective:  CURRENT TEMPERATURE:  Temp: 97.6 °F (36.4 °C)  MAXIMUM TEMPERATURE OVER 24HRS:  Temp (24hrs), Av.7 °F (36.5 °C), Min:97.6 °F (36.4 °C), Max:97.9 °F (36.6 °C)    CURRENT RESPIRATORY RATE:  Resp: 18  CURRENT PULSE:  Heart Rate: 64  CURRENT BLOOD PRESSURE:  BP: (!) 151/81  24HR BLOOD PRESSURE RANGE:  Systolic (02QRH), NUK:880 , Min:146 , VTF:037   ; Diastolic (78ZFX), NLY:80, Min:81, Max:98    24HR INTAKE/OUTPUT:    Intake/Output Summary (Last 24 hours) at 10/13/2022 1632  Last data filed at 10/12/2022 1810  Gross per 24 hour   Intake --   Output 650 ml   Net -650 ml     No data found.     Physical Exam:  GENERAL APPEARANCE: Alert and cooperative, and appears to be in no acute distress. HEAD: normocephalic  EYES: EOMI. Not pale, anicteric   NOSE:  No nasal discharge. THROAT:  Oral cavity and pharynx normal. Moist  CARDIAC: Normal S1 and S2. No S3, S4 or murmurs. Rhythm is regular. LUNGS: Clear to auscultation and percussion without rales, rhonchi, wheezing or diminished breath sounds. GI-SOFT NT,  SOFT    MUSKULOSKELETAL: Adequately aligned spine. No joint erythema or tenderness. EXTREMITIES: No edema. Peripheral pulses intact. NEURO: Non focal      Labs:   CBC:  Recent Labs     10/11/22  0550 10/12/22  0622 10/13/22  0617   WBC 12.5* 13.3* 11.5*   RBC 4.14* 4.14* 4.10*   HGB 13.1* 13.2* 12.7*   HCT 39.4* 39.3* 39.1*   MCV 94.9 95.0 95.5   MCH 31.7 31.8 30.9   MCHC 33.3 33.5 32.4   RDW 14.9 14.3 14.5    217 228   MPV 9.0 9.5 9.6      BMP:   Recent Labs     10/11/22  0550 10/12/22  0622 10/13/22  0617    143 142   K 4.8 4.9 4.2   * 109* 109*   CO2 19* 17* 20   BUN 83* 87* 84*   CREATININE 3.55* 3.42* 3.16*   GLUCOSE 108* 103* 130*   CALCIUM 9.4 9.5 9.3      Phosphorus:  No results for input(s): PHOS in the last 72 hours. Magnesium: No results for input(s): MG in the last 72 hours. Albumin: No results for input(s): LABALBU in the last 72 hours. IRON:  No results for input(s): IRON in the last 72 hours. Iron Saturation:  Invalid input(s): PERCENTFE  TIBC:  No results for input(s): TIBC in the last 72 hours. FERRITIN:  No results for input(s): FERRITIN in the last 72 hours. SPEP: No results for input(s): SPEP in the last 72 hours. No results for input(s): PROT, ALBCAL, ALBCAL, ALBPCT, LABALPH, A1PCT, LABALPH, A2PCT, LABBETA, BETAPCT, GAMGLOB, GGPCT, PATH in the last 72 hours. UPEP: No results for input(s): TPU in the last 72 hours. Urine Sodium:  No results for input(s): HEATHER in the last 72 hours. Urine Potassium: No results for input(s): KUR in the last 72 hours.   Urine Chloride:  No results for input(s): CLU in the last 72 hours. Urine Ph:  Invalid input(s): PO4U  Urine Osmolarity: No results for input(s): OSMOU in the last 72 hours. Urine Creatinine:  No results for input(s): LABCREA in the last 72 hours. Urine Eosinophils: Invalid input(s): EOSU  Urine Protein:  No results for input(s): TPU in the last 72 hours. Urinalysis:  No results for input(s): Saira Satchel, PHUR, LABCAST, WBCUA, RBCUA, MUCUS, TRICHOMONAS, YEAST, BACTERIA, CLARITYU, SPECGRAV, LEUKOCYTESUR, UROBILINOGEN, BILIRUBINUR, BLOODU, GLUCOSEU, KETUA, AMORPHOUS in the last 72 hours. Radiology:  Reviewed as available. Assessment:   MILAGRO  ON CKD  most likely secondary to prerenal azotemia due to poor oral intake, rule out urinary retention, serum creatinine peaked to 3.5 mg/dL which is trending down   History of CKD 4 secondary to MPGN biopsy-proven baseline serum creatinine of 1.9-2.2 mg/dL follows up with Dr. Migue Dias   History of essential hypertension   Acute encephalopathy, most likely secondary to underlying dementia       Plan:   Continue IV fluids as per orders   Bladder scan postvoid residual rule out urinary retention   Urine electrolytes   Discharge planning in progress for  ECF      Thank you for the consultation.       Electronically signed by Mo Mckenna MD on 10/13/2022 at 4:32 PM

## 2022-10-13 NOTE — PROGRESS NOTES
Comprehensive Nutrition Assessment    Type and Reason for Visit:  Reassess    Nutrition Recommendations/Plan:   Will continue Regular diet and Ensure Enlive all trays  Please encouraged supplement intake. Malnutrition Assessment:  Malnutrition Status: At risk for malnutrition (Comment) (10/10/22 1229)    Context:  Acute Illness     Findings of the 6 clinical characteristics of malnutrition:  Energy Intake:  50% or less of estimated energy requirements for 5 or more days  Weight Loss:  No significant weight loss     Body Fat Loss:  No significant body fat loss     Muscle Mass Loss:  No significant muscle mass loss    Fluid Accumulation:  No significant fluid accumulation     Strength:  Not Performed    Nutrition Assessment:    Po intake has been minimal. Spoke to wife who states when she attempts to feed the pt he refuses po. Observed lunch tray- pt consumed around 50% of supplement    Nutrition Related Findings:    trace generalized edema, labs: Glu 130, Meds: Reviewed Wound Type: None       Current Nutrition Intake & Therapies:    Average Meal Intake: 1-25%  Average Supplements Intake: 26-50%  ADULT DIET; Regular  ADULT ORAL NUTRITION SUPPLEMENT; Breakfast, Lunch, Dinner; Standard High Calorie/High Protein Oral Supplement    Anthropometric Measures:  Height: 5' 11\" (180.3 cm)  Ideal Body Weight (IBW): 172 lbs (78 kg)    Admission Body Weight: 206 lb (93.4 kg)  Current Body Weight: 206 lb (93.4 kg),   IBW. Weight Source: Stated  Current BMI (kg/m2): 28.7  Usual Body Weight: 207 lb (93.9 kg)  % Weight Change (Calculated): -0.5                    BMI Categories: Overweight (BMI 25.0-29. 9)    Estimated Daily Nutrient Needs:  Energy Requirements Based On: Formula  Weight Used for Energy Requirements: Admission  Energy (kcal/day): Bureau x 1.1= 2502-2880 kcal  Weight Used for Protein Requirements: Admission  Protein (g/day): 1.3g/kg= 120 g       Nutrition Diagnosis:   Inadequate oral intake related to  (current medical condition) as evidenced by intake 0-25%    Nutrition Interventions:   Food and/or Nutrient Delivery: Continue Current Diet, Continue Oral Nutrition Supplement  Nutrition Education/Counseling: No recommendation at this time  Coordination of Nutrition Care: Continue to monitor while inpatient       Goals:  Previous Goal Met: No Progress toward Goal(s)  Goals: PO intake 50% or greater       Nutrition Monitoring and Evaluation:      Food/Nutrient Intake Outcomes: Food and Nutrient Intake, Supplement Intake  Physical Signs/Symptoms Outcomes: Biochemical Data, Chewing or Swallowing, GI Status, Fluid Status or Edema, Skin, Weight    Discharge Planning:    Continue current diet, Continue Oral Nutrition Supplement     Sary Jarquin, [address] [place] [address],   Contact: [phone]

## 2022-10-14 LAB
ABSOLUTE BANDS #: 0.29 K/UL (ref 0–1)
ABSOLUTE EOS #: 0.29 K/UL (ref 0–0.4)
ABSOLUTE LYMPH #: 0.29 K/UL (ref 1–4.8)
ABSOLUTE MONO #: 1.3 K/UL (ref 0.1–1.3)
ANION GAP SERPL CALCULATED.3IONS-SCNC: 11 MMOL/L (ref 9–17)
BANDS: 2 % (ref 0–10)
BASOPHILS # BLD: 0 % (ref 0–2)
BASOPHILS ABSOLUTE: 0 K/UL (ref 0–0.2)
BUN BLDV-MCNC: 69 MG/DL (ref 8–23)
CALCIUM SERPL-MCNC: 9.5 MG/DL (ref 8.6–10.4)
CHLORIDE BLD-SCNC: 106 MMOL/L (ref 98–107)
CO2: 22 MMOL/L (ref 20–31)
CREAT SERPL-MCNC: 2.72 MG/DL (ref 0.7–1.2)
CREATININE URINE: 94.8 MG/DL (ref 39–259)
EOSINOPHILS RELATIVE PERCENT: 2 % (ref 0–4)
GFR SERPL CREATININE-BSD FRML MDRD: 23 ML/MIN/1.73M2
GLUCOSE BLD-MCNC: 125 MG/DL (ref 70–99)
HCT VFR BLD CALC: 40.3 % (ref 41–53)
HEMOGLOBIN: 13.4 G/DL (ref 13.5–17.5)
LYMPHOCYTES # BLD: 2 % (ref 24–44)
MCH RBC QN AUTO: 31.5 PG (ref 26–34)
MCHC RBC AUTO-ENTMCNC: 33.3 G/DL (ref 31–37)
MCV RBC AUTO: 94.7 FL (ref 80–100)
METAMYELOCYTES ABSOLUTE COUNT: 0.14 K/UL
METAMYELOCYTES: 1 %
MONOCYTES # BLD: 9 % (ref 1–7)
MORPHOLOGY: NORMAL
PDW BLD-RTO: 14.4 % (ref 11.5–14.9)
PLATELET # BLD: 267 K/UL (ref 150–450)
PMV BLD AUTO: 9.3 FL (ref 6–12)
POTASSIUM SERPL-SCNC: 4.9 MMOL/L (ref 3.7–5.3)
RBC # BLD: 4.25 M/UL (ref 4.5–5.9)
SEG NEUTROPHILS: 84 % (ref 36–66)
SEGMENTED NEUTROPHILS ABSOLUTE COUNT: 12.09 K/UL (ref 1.3–9.1)
SODIUM BLD-SCNC: 139 MMOL/L (ref 135–144)
SODIUM,UR: 54 MMOL/L
WBC # BLD: 14.4 K/UL (ref 3.5–11)

## 2022-10-14 PROCEDURE — 85025 COMPLETE CBC W/AUTO DIFF WBC: CPT

## 2022-10-14 PROCEDURE — 6370000000 HC RX 637 (ALT 250 FOR IP): Performed by: PSYCHIATRY & NEUROLOGY

## 2022-10-14 PROCEDURE — 97530 THERAPEUTIC ACTIVITIES: CPT

## 2022-10-14 PROCEDURE — 82570 ASSAY OF URINE CREATININE: CPT

## 2022-10-14 PROCEDURE — 1200000000 HC SEMI PRIVATE

## 2022-10-14 PROCEDURE — 36415 COLL VENOUS BLD VENIPUNCTURE: CPT

## 2022-10-14 PROCEDURE — 6370000000 HC RX 637 (ALT 250 FOR IP): Performed by: FAMILY MEDICINE

## 2022-10-14 PROCEDURE — 80048 BASIC METABOLIC PNL TOTAL CA: CPT

## 2022-10-14 PROCEDURE — 97110 THERAPEUTIC EXERCISES: CPT

## 2022-10-14 PROCEDURE — 97535 SELF CARE MNGMENT TRAINING: CPT

## 2022-10-14 PROCEDURE — 2580000003 HC RX 258: Performed by: FAMILY MEDICINE

## 2022-10-14 PROCEDURE — 84300 ASSAY OF URINE SODIUM: CPT

## 2022-10-14 PROCEDURE — 51798 US URINE CAPACITY MEASURE: CPT

## 2022-10-14 RX ADMIN — TAMSULOSIN HYDROCHLORIDE 0.4 MG: 0.4 CAPSULE ORAL at 09:45

## 2022-10-14 RX ADMIN — QUETIAPINE FUMARATE 100 MG: 100 TABLET ORAL at 19:56

## 2022-10-14 RX ADMIN — GALANTAMINE 8 MG: 4 TABLET, FILM COATED ORAL at 18:16

## 2022-10-14 RX ADMIN — Medication 9 MG: at 18:15

## 2022-10-14 RX ADMIN — CARVEDILOL 6.25 MG: 6.25 TABLET, FILM COATED ORAL at 18:15

## 2022-10-14 RX ADMIN — DEXTROSE AND SODIUM CHLORIDE: 5; 450 INJECTION, SOLUTION INTRAVENOUS at 16:20

## 2022-10-14 RX ADMIN — SODIUM CHLORIDE, PRESERVATIVE FREE 10 ML: 5 INJECTION INTRAVENOUS at 19:56

## 2022-10-14 ASSESSMENT — PAIN SCALES - GENERAL: PAINLEVEL_OUTOF10: 0

## 2022-10-14 NOTE — CARE COORDINATION
SW scheduled P2P for today between 12:30-4:30PM. P2P will be completed by Dr. Wilfredo Calhoun and [facility] Dr. Stacy Parra. SW following for determination.

## 2022-10-14 NOTE — PROGRESS NOTES
NEPHROLOGY progress note    Patient :  Karina Taylor; 65 y.o. MRN# [id number]  Location:  2069/2069-01  Attending:  Octavia Mcintosh MD  Admit Date:  10/3/2022   Hospital Day: 6      Reason for Consult:   acute kidney injury on CKD      Chief Complaint:   change in mental status , combative behavior      Subjective/interval history. Patient seen and examined patient remains comfortable, confused withdrawn, nonverbal.  Awake responsive  Serum creatinine improved to 2.7 mg/dL blood pressure stable    History of Present Illness: This is a 65 y.o. 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 Sharla Maria with baseline serum creatinine 1.9-2.2 mg/dL. Patient presented to the hospital  on 10/03/2022 with complains of change in mental status more confusion and combative behavior. On admission serum creatinine was noted to be 2.4 mg/dL which  peaked to 3.5 mg/dL on 10/11/2022. Patient has not been eating or drinking much poor oral intake and appetite   Patient's blood pressure has been stable   CT head no acute intracranial abnormality   Chest x-ray no confluent airspace consolidation    Past Medical History:        Diagnosis Date    Arthritis     Aspirin long-term use     Back pain     Chronic fatigue 10/7/2020    Chronic kidney disease     CKD (chronic kidney disease), stage III ([facility] [address] 8/9/2017    Baseline creatinine 1.4-1.5. Proteinuria reported by primary physician. Workup in progress. Essential hypertension 8/9/2017    Fall at home 12/09/2019    Family history of prostate problems     Heart disease     Hiatal hernia     HTN (hypertension)     Hyperlipemia     Interstitial nephritis chronic 9/28/2020    Kidney bx 9/25/20 shows chronic interstitial nephritis with hypertensive changes. Likely cause NSAIDs     Isolated proteinuria 8/9/2017    Nonnephrotic, being quantified.     Kidney disease, chronic, stage IV (severe, EGFR 15-29 ml/min) ([facility] 8/9/2017    Baseline creatinine 1.4-1.5. Proteinuria reported by primary physician. Likely from long term NSAID use. Workup for immune complex, paraprotein disease negative. Proteinuria fluctuates between 1-2 g.  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    Appears to be primary    MVP (mitral valve prolapse)     Neck pain     Nephrotic syndrome 10/7/2020    NSAID long-term use 8/9/2017    Primary osteoarthritis of hand 8/9/2017    Urination, excessive at night     Vascular dementia without behavioral disturbance ([facility] [address] 9/7/2022       Past Surgical History:        Procedure Laterality Date    ANKLE SURGERY      COLONOSCOPY      CT BIOPSY RENAL  9/22/2020    CT BIOPSY RENAL 9/22/2020 [facility] PROCEDURES    INGUINAL HERNIA REPAIR  12/1/08    [facility]    LUMBAR DISC SURGERY      NECK SURGERY      SPINE SURGERY      TONSILLECTOMY AND ADENOIDECTOMY         Current Medications:    dextrose 5 % and 0.45 % sodium chloride infusion, Continuous  carvedilol (COREG) tablet 6.25 mg, BID WC  LORazepam (ATIVAN) injection 0.5 mg, Q10 Min PRN  allopurinol (ZYLOPRIM) tablet 300 mg, Daily  aspirin EC tablet 81 mg, Daily  melatonin tablet 9 mg, QPM  [Held by provider] spironolactone (ALDACTONE) tablet 25 mg, Daily  tamsulosin (FLOMAX) capsule 0.4 mg, Daily  haloperidol lactate (HALDOL) injection 5 mg, Q8H PRN  QUEtiapine (SEROQUEL) tablet 100 mg, Nightly  galantamine (RAZADYNE) tablet 8 mg, BID WC  sodium chloride flush 0.9 % injection 5-40 mL, 2 times per day  sodium chloride flush 0.9 % injection 5-40 mL, PRN  0.9 % sodium chloride infusion, PRN  [Held by provider] enoxaparin Sodium (LOVENOX) injection 30 mg, Daily  acetaminophen (TYLENOL) tablet 650 mg, Q4H PRN  ondansetron (ZOFRAN-ODT) disintegrating tablet 4 mg, Q8H PRN   Or  ondansetron (ZOFRAN) injection 4 mg, Q6H PRN      Allergies:   Other    Social History:   Social History     Socioeconomic History    Marital status:      Spouse name: Not on file    Number of children: Not on file    Years of education: Not on file    Highest education level: Not on file   Occupational History    Not on file   Tobacco Use    Smoking status: Former     Packs/day: 0.50     Years: 15.00     Pack years: 7.50     Types: Cigarettes     Start date: 3/17/1960     Quit date: 3/17/1975     Years since quittin.6    Smokeless tobacco: Never   Vaping Use    Vaping Use: Never used   Substance and Sexual Activity    Alcohol use: No    Drug use: No    Sexual activity: Not on file   Other Topics Concern    Not on file   Social History Narrative    Not on file     Social Determinants of Health     Financial Resource Strain: Low Risk     Difficulty of Paying Living Expenses: Not hard at all   Food Insecurity: No Food Insecurity    Worried About Running Out of Food in the Last Year: Never true    Ran Out of Food in the Last Year: Never true   Transportation Needs: Not on file   Physical Activity: Not on file   Stress: Not on file   Social Connections: Not on file   Intimate Partner Violence: Not on file   Housing Stability: Not on file       Family History:   Family History   Family history unknown: Yes       Review of Systems:    unobtainable      Objective:  CURRENT TEMPERATURE:  Temp: 98.1 °F (36.7 °C)  MAXIMUM TEMPERATURE OVER 24HRS:  Temp (24hrs), Av.9 °F (36.6 °C), Min:97.7 °F (36.5 °C), Max:98.1 °F (36.7 °C)    CURRENT RESPIRATORY RATE:  Resp: 18  CURRENT PULSE:  Heart Rate: 57  CURRENT BLOOD PRESSURE:  BP: 132/81  24HR BLOOD PRESSURE RANGE:  Systolic (21GCE), HSS:788 , Min:132 , SXA:480   ; Diastolic (57AGH), ARLENE:07, Min:80, Max:81    24HR INTAKE/OUTPUT:    Intake/Output Summary (Last 24 hours) at 10/14/2022 1357  Last data filed at 10/14/2022 9077  Gross per 24 hour   Intake --   Output 548 ml   Net -548 ml     No data found. Physical Exam:  GENERAL APPEARANCE: Alert and cooperative, and appears to be in no acute distress. HEAD: normocephalic  EYES: EOMI. Not pale, anicteric   NOSE:  No nasal discharge. THROAT:  Oral cavity and pharynx normal. Moist  CARDIAC: Normal S1 and S2. No S3, S4 or murmurs. Rhythm is regular. LUNGS: Clear to auscultation and percussion without rales, rhonchi, wheezing or diminished breath sounds. GI-SOFT NT,  SOFT    MUSKULOSKELETAL: Adequately aligned spine. No joint erythema or tenderness. EXTREMITIES: No edema. Peripheral pulses intact. NEURO: Non focal      Labs:   CBC:  Recent Labs     10/12/22  0622 10/13/22  0617 10/14/22  0646   WBC 13.3* 11.5* 14.4*   RBC 4.14* 4.10* 4.25*   HGB 13.2* 12.7* 13.4*   HCT 39.3* 39.1* 40.3*   MCV 95.0 95.5 94.7   MCH 31.8 30.9 31.5   MCHC 33.5 32.4 33.3   RDW 14.3 14.5 14.4    228 267   MPV 9.5 9.6 9.3      BMP:   Recent Labs     10/12/22  0622 10/13/22  0617 10/14/22  0646    142 139   K 4.9 4.2 4.9   * 109* 106   CO2 17* 20 22   BUN 87* 84* 69*   CREATININE 3.42* 3.16* 2.72*   GLUCOSE 103* 130* 125*   CALCIUM 9.5 9.3 9.5      Phosphorus:  No results for input(s): PHOS in the last 72 hours. Magnesium: No results for input(s): MG in the last 72 hours. Albumin: No results for input(s): LABALBU in the last 72 hours. IRON:  No results for input(s): IRON in the last 72 hours. Iron Saturation:  Invalid input(s): PERCENTFE  TIBC:  No results for input(s): TIBC in the last 72 hours. FERRITIN:  No results for input(s): FERRITIN in the last 72 hours. SPEP: No results for input(s): SPEP in the last 72 hours. No results for input(s): PROT, ALBCAL, ALBCAL, ALBPCT, LABALPH, A1PCT, LABALPH, A2PCT, LABBETA, BETAPCT, GAMGLOB, GGPCT, PATH in the last 72 hours. UPEP: No results for input(s): TPU in the last 72 hours. Urine Sodium:    Recent Labs     10/14/22  0703   HEATHER 54      Urine Potassium: No results for input(s): KUR in the last 72 hours. Urine Chloride:  No results for input(s): CLU in the last 72 hours. Urine Ph:  Invalid input(s): PO4U  Urine Osmolarity: No results for input(s): OSMOU in the last 72 hours. Urine Creatinine:    Recent Labs     10/14/22  0703   LABCREA 94.8     Urine Eosinophils: Invalid input(s): EOSU  Urine Protein:  No results for input(s): TPU in the last 72 hours. Urinalysis:  No results for input(s): Florance Fast, PHUR, LABCAST, WBCUA, RBCUA, MUCUS, TRICHOMONAS, YEAST, BACTERIA, CLARITYU, SPECGRAV, LEUKOCYTESUR, UROBILINOGEN, BILIRUBINUR, BLOODU, GLUCOSEU, KETUA, AMORPHOUS in the last 72 hours. Radiology:  Reviewed as available. Assessment:   MILAGRO  ON CKD  most likely secondary to prerenal azotemia due to poor oral intake, rule out urinary retention, serum creatinine peaked to 3.5 mg/dL which is trending down to 2.7 mg/dL   History of CKD 4 secondary to MPGN biopsy-proven baseline serum creatinine of 1.9-2.2 mg/dL follows up with Dr. Davi Casper   History of essential hypertension   Acute encephalopathy, most likely secondary to underlying dementia       Plan:  Can DC IV fluids on discharge     Discharge planning in progress for  [facility]      Thank you for the consultation.       Electronically signed by [facility], MD on 10/14/2022 at 1:57 PM

## 2022-10-14 NOTE — CARE COORDINATION
MARK received message from Osman Lara with [facility] that insurance has intent to deny pt. P2P opportunity was given and must be completed by 4:30PM by calling [phone]. MARK provided P2P information to Dr. Nela Shearer to determine if she thinks pt is appropriate for P2P.

## 2022-10-14 NOTE — PROGRESS NOTES
Hospitalist Progress Note  10/14/2022 4:48 PM  Subjective:   Admit Date: 10/3/2022  PCP: Brittany King MD     DNR-CCA      C/c:  Chief Complaint   Patient presents with    Altered Mental Status         Interval History: pt was refused for snf by insurance company, pt awaiting possible placement in nursing home    Diet: ADULT DIET; Regular  ADULT ORAL NUTRITION SUPPLEMENT; Breakfast, Lunch, Dinner; Standard High Calorie/High Protein Oral Supplement                                ip days:11  Medications:   Scheduled Meds:   carvedilol  6.25 mg Oral BID WC    allopurinol  300 mg Oral Daily    aspirin  81 mg Oral Daily    melatonin  9 mg Oral QPM    [Held by provider] spironolactone  25 mg Oral Daily    tamsulosin  0.4 mg Oral Daily    QUEtiapine  100 mg Oral Nightly    galantamine  8 mg Oral BID WC    sodium chloride flush  5-40 mL IntraVENous 2 times per day    [Held by provider] enoxaparin  30 mg SubCUTAneous Daily     Continuous Infusions:   dextrose 5 % and 0.45 % NaCl 75 mL/hr at 10/14/22 1620    sodium chloride Stopped (10/08/22 0132)     PRN Meds:. LORazepam, haloperidol lactate, sodium chloride flush, sodium chloride, acetaminophen, ondansetron **OR** ondansetron     CBC:   Recent Labs     10/12/22  0622 10/13/22  0617 10/14/22  0646   WBC 13.3* 11.5* 14.4*   HGB 13.2* 12.7* 13.4*    228 267     BMP:    Recent Labs     10/12/22  0622 10/13/22  0617 10/14/22  0646    142 139   K 4.9 4.2 4.9   * 109* 106   CO2 17* 20 22   BUN 87* 84* 69*   CREATININE 3.42* 3.16* 2.72*   GLUCOSE 103* 130* 125*     Hepatic: No results for input(s): AST, ALT, ALB, BILITOT, ALKPHOS in the last 72 hours. Troponin: No results for input(s): TROPONINI in the last 72 hours. BNP: No results for input(s): BNP in the last 72 hours. Lipids: No results for input(s): CHOL, HDL in the last 72 hours. Invalid input(s): LDLCALCU  INR: No results for input(s): INR in the last 72 hours.     Objective:   Vitals: /81 Pulse 57   Temp 98.1 °F (36.7 °C)   Resp 18   Ht 5' 11\" (1.803 m)   Wt 206 lb (93.4 kg)   SpO2 99%   BMI 28.73 kg/m²   General appearance: alert, appears stated age and cooperative  Skin: Skin color, texture, turgor normal. No rashes or lesions  Lungs: clear to auscultation bilaterally  Heart: regular rate and rhythm, S1, S2 normal, no murmur, click, rub or gallop  Abdomen: soft, non-tender; bowel sounds normal; no masses,  no organomegaly  Extremities: extremities normal, atraumatic, no cyanosis or edema  Neurologic: Mental status: Alert, oriented, thought content appropriate    Prophylaxis:   DVT with  [] lovenox        [] heparin        [] Scd        [x] none:     Radiology:  CT HEAD WO CONTRAST    Result Date: 10/3/2022  EXAMINATION: CT OF THE HEAD WITHOUT CONTRAST  10/3/2022 11:27 am TECHNIQUE: CT of the head was performed without the administration of intravenous contrast. Automated exposure control, iterative reconstruction, and/or weight based adjustment of the mA/kV was utilized to reduce the radiation dose to as low as reasonably achievable. COMPARISON: 01/21/2021 HISTORY: ORDERING SYSTEM PROVIDED HISTORY: Mental Status Changes TECHNOLOGIST PROVIDED HISTORY: Mental Status Changes Decision Support Exception - unselect if not a suspected or confirmed emergency medical condition->Emergency Medical Condition (MA) Reason for Exam: AMS. FINDINGS: BRAIN/VENTRICLES: There is no acute intracranial hemorrhage, mass effect or midline shift. No abnormal extra-axial fluid collection. The gray-white differentiation is maintained without evidence of an acute infarct. There is no evidence of hydrocephalus. ORBITS: The visualized portion of the orbits demonstrate no acute abnormality. SINUSES: There is mild mucosal thickening in the right frontal, ethmoid, and bilateral sphenoid sinuses. Mild mucosal thickening in the right maxillary sinus. There are bubbly secretions in the posterior right ethmoid sinuses.  No air-fluid level. Mastoid air cells are aerated. SOFT TISSUES/SKULL:  No acute abnormality of the visualized skull or soft tissues. No acute intracranial abnormality. VL Carotid Bilateral    Result Date: 10/5/2022    [facility]  Vascular Carotid Procedure   Patient Name Aleena Lin Date of Study           10/04/2022               O   Date of      1945  Gender                  Male  Birth   Age          65 year(s)  Race                       Room Number  2069   Corporate ID [id number]  #   Patient Pati [de-identified]  #   MR #         [id number]      Darrell Philip   Accession #  [id number]  Interpreting Physician  Cesario Ramirez   Referring                Referring Physician     Can Nunez  Nurse  Practitioner  Procedure Type of Study:   Cerebral: Carotid, Carotid Scan Bilateral.  Indications for Study:Neck distention and Carotid stenosis. Patient Status: In Patient. Technical Quality:Adequate visualization. Conclusions   Summary   Bilateral:  There is plaque at the level of the carotid bifurcation with a velocity  profile consistent with no significant stenosis . The vertebral arteries  are patent with antegrade flow. Signature   ----------------------------------------------------------------  Electronically signed by Raffy Nunez(Sonographer) on  10/04/2022 11:36 AM  ----------------------------------------------------------------   ----------------------------------------------------------------  Electronically signed by Cesario Ramirez(Interpreting physician)  on 10/05/2022 06:29 AM  ----------------------------------------------------------------  Findings:   Right Impression:                    Left Impression:  The common carotid artery is normal. The common carotid artery is normal.   The carotid bulb is normal.          The carotid bulb is normal.   The external carotid artery has a    The external carotid artery has a  smooth calcific plaque. smooth calcific plaque. The internal carotid artery has a    The internal carotid artery has a  smooth calcific plaque causing a     smooth calcific plaque causing a <50%  <50% stenosis based on velocities. stenosis based on velocities. The vertebral artery is patent with  The vertebral artery is patent with  antegrade flow. antegrade flow. Velocities are measured in cm/s ; Diameters are measured in cm Carotid Right Measurements +------------+-------+------+--------+-------+------------+----------------+ ! Location    ! PSV    ! EDV   ! Angle   ! RI     !%Stenosis   ! Tortuosity      ! +------------+-------+------+--------+-------+------------+----------------+ ! Prox CCA    !59.6   ! 10    !        !0.83   !            !                ! +------------+-------+------+--------+-------+------------+----------------+ ! Mid CCA     !52.2   ! 10    !        !0.81   !            !                ! +------------+-------+------+--------+-------+------------+----------------+ ! Dist CCA    !52.2   !11    !        !0.79   !            !                ! +------------+-------+------+--------+-------+------------+----------------+ ! Bulb        !44.7   !11    !        !0.75   !            !                ! +------------+-------+------+--------+-------+------------+----------------+ ! Prox ICA    !37.3   !8     !        !0.79   !            !                ! +------------+-------+------+--------+-------+------------+----------------+ ! Mid ICA     !71     !18    !        !0.74   !            !                ! +------------+-------+------+--------+-------+------------+----------------+ ! Dist ICA    !74.6   !20    !        !0.73   !            !                ! +------------+-------+------+--------+-------+------------+----------------+ ! Prox ECA    !52.8   !5     !        !0.91   !            !                ! +------------+-------+------+--------+-------+------------+----------------+ ! Vertebral !31.1   !0     !        !1      !            !                ! +------------+-------+------+--------+-------+------------+----------------+   - Additional Measurements:ICAPSV/CCAPSV 1.25. ICAEDV/CCAEDV 2. Carotid Left Measurements +------------+-------+------+--------+-------+------------+----------------+ ! Location    ! PSV    ! EDV   ! Angle   ! RI     !%Stenosis   ! Tortuosity      ! +------------+-------+------+--------+-------+------------+----------------+ ! Prox CCA    !68.3   !13    !        !0.81   !            !                ! +------------+-------+------+--------+-------+------------+----------------+ ! Mid CCA     !82.6   !14    !        !0.83   !            !                ! +------------+-------+------+--------+-------+------------+----------------+ ! Dist CCA    !84.5   !17    !        !0.8    ! !                ! +------------+-------+------+--------+-------+------------+----------------+ ! Bulb        !47.8   ! 10    !        !0.79   !            !                ! +------------+-------+------+--------+-------+------------+----------------+ ! Prox ICA    !75.2   ! 19    !        !0.75   !            !                ! +------------+-------+------+--------+-------+------------+----------------+ ! Mid ICA     !104    !32    !        !0.69   !            !                ! +------------+-------+------+--------+-------+------------+----------------+ ! Dist ICA    ! 97.5   !26    !        !0.73   !            !                ! +------------+-------+------+--------+-------+------------+----------------+ ! Prox ECA    !72.7   !12    !        !0.83   !            !                ! +------------+-------+------+--------+-------+------------+----------------+ ! Vertebral   !34.2   !6     !        !0.82   !            !                ! +------------+-------+------+--------+-------+------------+----------------+   - Additional Measurements:ICAPSV/CCAPSV 1.52. ICAEDV/CCAEDV 2.46.       Assessment : Dementia/  Ckd       Plan:   1. Better/await approval for placement  2. See order/spoke with wife    Patient Active Problem List:     History of left inguinal hernia     Kidney disease, chronic, stage IV (severe, EGFR 15-29 ml/min) (HCC)     Isolated proteinuria     NSAID long-term use     Essential hypertension     Primary osteoarthritis of hand     Mitral valve prolapse     Interstitial nephritis chronic     MPGN (membranoproliferative glomerulonephritis), type 1     Nephrotic syndrome     Weakness     MILAGRO (acute kidney injury) ([facility] [address]     Urinary incontinence     Gait disturbance     BPH (benign prostatic hyperplasia)     Vascular dementia without behavioral disturbance ([address]     AMS (altered mental status)     Acute encephalopathy     Memory loss     Cognitive impairment      Anticipated Disposition upon discharge: [] Home                                                                         [] Home with Home Health                                                                         [] [facility]                                                                         [] [address] ,[address]      Patient is admitted as inpatient status because of co-morbidities listed above, severity of signs and symptoms as outlined, requirement for current medical therapies and most importantly because of direct risk to patient if care not provided in a hospital setting.           Elizabeth Giordano MD, MD  Rounding Hospitalist

## 2022-10-14 NOTE — PROGRESS NOTES
[address] [facility]    INPATIENT OCCUPATIONAL THERAPY  PROGRESS NOTE  Date: 10/14/2022  Patient Name: Katie Donovan       Room:   MRN: [id number]    : 1945  (65 y.o.)  Gender: male   Referring Practitioner: Landry Kellogg MD  Diagnosis: AMS    Restrictions/Precautions  Restrictions/Precautions  Restrictions/Precautions: Fall Risk  Required Braces or Orthoses?: No  Implants present? :  (h/o cervical and lumbar sx, ankle sx)         Subjective  Subjective  Subjective:  stating non-sensical comments, confused and difficult to understand throughout session, pt alert and cooperative  Comments: co-tx with Haley Ricks PTA    Objective  Orientation  Overall Orientation Status: Impaired  Orientation Level: Disoriented X4 (pt responds to his name and nickname \"Moose\" but unable to state name this date. Cognition  Overall Cognitive Status: Exceptions  Arousal/Alertness: Inconsistent responses to stimuli;Delayed responses to stimuli  Following Commands: Inconsistently follows commands  Attention Span: Difficulty attending to directions  Memory: Decreased recall of biographical Information;Decreased recall of precautions;Decreased recall of recent events;Decreased short term memory;Decreased long term memory  Safety Judgement: Decreased awareness of need for safety  Problem Solving: Assistance required to identify errors made  Insights: Not aware of deficits  Initiation: Requires cues for all  Sequencing: Requires cues for all  Cognition Comment: pt pleasantly confused, cooperative with increased time,     ADL  Feeding: Dependent/Total  Grooming: Moderate assistance  UE Bathing:  Moderate assistance  LE Bathing: Maximum assistance  UE Dressing: Dependent/Total  LE Dressing: Dependent/Total  Toileting: Dependent/Total  Toileting Skilled Clinical Factors: incontinent of urine this date, Dep for hygiene and brief change  Additional Comments: pt requires max VCs and tactiles cues for initiation and continuation of tasks. pt washes face neck, chest, and abdomen, other ADL scores based on skilled observations and clinical reasoning unless otherwise noted.  Patient requires significant assistance with self-care due to cognitive status, significant weakness, inconsistency with following 1-step commands, and balance deficits         Balance  Balance  Sitting Balance: Maximum assistance (20-25 minutes, A x1 with noted R lateral lean and anterior lean, max verbal/tactile cuing req for correction with poor carryover noted)  Standing Balance: Dependent/Total (Ax1-2, heavy anterior right lean with cuing for correction, poor carryover noted, cedrick stedy)  Standing Balance  Time: 1'15\" minutes x 2, ~45 sec x3 with cedrick stedy  Activity: static standing with cedrick stedy  Comment: heavy forward flex with max VC/tactile cuing for correction, poor carryover noted    Transfers/Mobility  Bed mobility  Rolling to Right: Dependent/Total;2 Person assistance (max A x 2)  Supine to Sit: 2 Person assistance;Dependent/Total (max A x 2)  Sit to Supine: 2 Person assistance;Dependent/Total (max A x 2)  Scooting: Dependent/Total;2 Person assistance  Bed Mobility Comments: increased time for all parts 2* decreased cognition, hand over hand to reach bed rail, unable to hold, max cuing req with poor carryover noted  Transfers  Sit to stand: 2 Person assistance;Dependent/Total  Stand to sit: 2 Person assistance;Dependent/Total  Transfer Comments: using cedrick stedy, heavy anterior lean noted      Patient Education  Patient Education  Education Given To: Patient  Education Provided: Role of Therapy, Plan of Care, Transfer Training, Precautions  Education Method: Verbal, Demonstration  Barriers to Learning: Cognition  Education Outcome: Continued education needed      Goals  Short Term Goals  Time Frame for Short Term Goals: By discharge  Short Term Goal 1: Pt will complete upper body dressing/bathing with min A and Good attention to task  Short Term Goal 2: Pt will complete simple grooming task/self feeding with SBA and min verbal cues  Short Term Goal 3: Pt will complete lower body dressing/bathing with Max A and Good attention to task  Short Term Goal 4: Pt will complete functional transfers/mobility with mod A and Good safety with use of least restrictive device  Short Term Goal 5: Pt will follow simple 1-2 step commands 75% of the time to increase participation in daily activities  Short Term Goal 6: Pt will participate in 15+ minutes of therapeutic exercies/functional activities to increase safety and independence with self care and mobility  Occupational Therapy Plan  Times Per Week: 4-6  Times Per Day:  Once a day  Current Treatment Recommendations: Self-Care / ADL, Strengthening, ROM, Balance training, Functional mobility training, Endurance training, Cognitive reorientation, Pain management, Safety education & training, Patient/Caregiver education & training, Equipment evaluation, education, & procurement, Cognitive/Perceptual training, Coordination training      Assessment  Activity Tolerance  Activity Tolerance: Treatment limited secondary to decreased cognition  Assessment  Performance deficits / Impairments: Decreased ADL status, Decreased functional mobility , Decreased strength, Decreased safe awareness, Decreased cognition, Decreased endurance, Decreased balance, Decreased high-level IADLs, Decreased fine motor control, Decreased coordination, Decreased posture  Assessment: pillows and wedge used to support pt while sitting in recliner, NSG made aware  Treatment Diagnosis: Impaired self care status  Prognosis: Fair  Decision Making: Medium Complexity  Discharge Recommendations: Patient would benefit from continued therapy after discharge  OT Equipment Recommendations  Other: TBD  Safety Devices  Type of Devices: Patient at risk for falls, Gait belt, Call light within reach, Left in bed      AM-PAC Daily Activities Inpatient  AM-PAC Daily Activity Inpatient   How much help for putting on and taking off regular lower body clothing?: Total  How much help for Bathing?: A Lot  How much help for Toileting?: Total  How much help for putting on and taking off regular upper body clothing?: A Lot  How much help for taking care of personal grooming?: A Lot  How much help for eating meals?: Total  AM-PAC Inpatient Daily Activity Raw Score: 9  AM-PAC Inpatient ADL T-Scale Score : 25.33  ADL Inpatient CMS 0-100% Score: 79.59  ADL Inpatient CMS G-Code Modifier : CL    OT Minutes  OT Individual Minutes  Time In: [address]  Time Out: [address]  Minutes: [address]

## 2022-10-14 NOTE — CARE COORDINATION
SW met with spouse and patient. Spouse appeared tearful. SW explained the different levels of insurance. SW explained that the initial authorization was denied for patient. SW explained to spouse that we initiated the peer to peer but that was denied as well. SW explained that we are now in the process of starting the expedited appeal. SW explained that this is one final attempt to discuss with insurance WHY patient needs skilled nursing facility. An expedited appeal can take up to 72 hours to come back. SW explained to spouse that insurance companies are closed over the weekend. There is a high possibility that patient can be here through Tuesday/Wednesday next week.  SW will follow up with insurance company on Monday to see if the status of expedited appeal. Electronically signed by JAMILAH Pal on 10/14/2022 at 4:47 PM

## 2022-10-14 NOTE — PROGRESS NOTES
Physical Therapy  Facility/Department: [facility]  Daily Treatment Note  NAME: Shyam Montero  : 1945  MRN: [id number]    Date of Service: 10/14/2022    Discharge Recommendations:  Patient would benefit from continued therapy after discharge        Patient Diagnosis(es): The encounter diagnosis was Altered mental status, unspecified altered mental status type. Assessment   Activity Tolerance: Patient limited by fatigue;Patient limited by endurance; Patient tolerated treatment well     Plan    Physcial Therapy Plan  General Plan: 3-5 times per week  Current Treatment Recommendations: Strengthening;Balance training;Functional mobility training;Transfer training;Gait training; Endurance training;Cognitive/Perceptual training; Safety education & training;Positioning;Cognitive reorientation;Patient/Caregiver education & training;Equipment evaluation, education, & procurement; Therapeutic activities     Restrictions  Restrictions/Precautions  Restrictions/Precautions: Fall Risk  Required Braces or Orthoses?: No  Implants present? :  (h/o cervical and lumbar sx, ankle sx)  Position Activity Restriction  Other position/activity restrictions: up with assist, 1:1 sitter, bed alarm, personal alarm when in chair     Subjective    Subjective  Subjective: Pt is alert in bed and very cooperative. Cognition limiting some, however after max verbal cues pt does attempt instruction.   Pain: Denies Pain  Orientation  Overall Orientation Status: Impaired  Orientation Level: Disoriented X4  Cognition  Overall Cognitive Status: Exceptions  Arousal/Alertness: Inconsistent responses to stimuli;Delayed responses to stimuli  Following Commands: Inconsistently follows commands  Attention Span: Difficulty attending to directions  Memory: Decreased recall of biographical Information;Decreased recall of precautions;Decreased recall of recent events;Decreased short term memory;Decreased long term memory  Safety Judgement: Decreased awareness of need for safety  Problem Solving: Assistance required to identify errors made  Insights: Not aware of deficits  Initiation: Requires cues for all  Sequencing: Requires cues for all  Cognition Comment: pt pleasantly confused, cooperative with increased time, fatigued     Objective        Bed Mobility Training  Bed Mobility Training: Yes  Interventions: Safety awareness training;Verbal cues; Tactile cues  Rolling: Total assistance;Assist X2  Supine to Sit: Total assistance;Assist X2  Sit to Supine: Maximum assistance;Assist X2;Additional time; Adaptive equipment; Total assistance  Scooting: Maximum assistance; Additional time;Assist X2;Adaptive equipment; Total assistance  Balance  Sitting: Impaired  Sitting - Static: Poor (constant support)  Sitting - Dynamic: Poor (constant support)  Standing: Impaired  Standing - Static: Poor  Standing - Dynamic: Not tested  Transfer Training  Transfer Training: Yes  Interventions: Safety awareness training  Sit to Stand: Maximum assistance;Assist X2;Additional time; Adaptive equipment Sherre Ridgel)  Stand to Sit: Maximum assistance;Assist X2;Additional time; Adaptive equipment Sherre Ridgel)  Gait Training  Gait Training: No     PT Exercises  Exercise Treatment: Standing brief change due to incontinent of bladder, sheets changed with katie changed. Static Sitting Balance Exercises: Dangle EOB 20-25  Static Standing Balance Exercises: STS in Sherre Ridgel x5, static standing tolerance 1min 15sec x2, 45sec x3  Postural Correction Exercises: Sitting up EOB heavy heavy right anterior lateral lean,     Safety Devices  Type of Devices: Patient at risk for falls;Gait belt;Call light within reach; Left in bed       AM-PAC Mobility Inpatient   How much difficulty turning over in bed?: Unable  How much difficulty sitting down on / standing up from a chair with arms?: Unable  How much difficulty moving from lying on back to sitting on side of bed?: Unable  How much help from another person moving to and from a bed to a chair?: Total  How much help from another person needed to walk in hospital room?: Total  How much help from another person for climbing 3-5 steps with a railing?: Total  AM-PAC Inpatient Mobility Raw Score : 6  AM-PAC Inpatient T-Scale Score : 23.55  Mobility Inpatient CMS 0-100% Score: 100  Mobility Inpatient CMS G-Code Modifier : CN      Goals  Short Term Goals  Time Frame for Short Term Goals: 7 days  Short Term Goal 1: Pt will increase Bilat LE strength to 4-/5 to maximize mobiltiy  Short Term Goal 2: Pt will perform bed mobiltiy Mod A  Short Term Goal 3: Pt will perform transfers with RW Mod A  Short Term Goal 4: Pt will ambulate with RW 50 ft Mod A  Additional Goals?: No  Patient Goals   Patient Goals : unable to state    Education  Patient Education  Education Given To: Patient; Family;Caregiver  Education Provided: Precautions;Transfer Training; Fall Prevention Strategies  Education Method: Demonstration;Verbal  Barriers to Learning: Cognition  Education Outcome: Continued education needed    Therapy Time   Individual Concurrent Group Co-treatment   Time In [address]         Time Out 1457         Minutes [address] [place], [place]

## 2022-10-14 NOTE — CARE COORDINATION
MARK faxed note of documented denial and updated therapy notes to [employer] at this time.      [employer]: [phone]    Electronically signed by JAMILAH Oliver on 10/14/22 at 4:15 PM EDT

## 2022-10-14 NOTE — CARE COORDINATION
MARK received a call from Dr. Brit Varela that pt has been denied with P2P completed. Dr. Brit Varela informed SW that if we can submit updated therapy notes and documents showing that in-network facilities denied that pt can be approved today. MARK called Majo Walters and requested updated therapy notes.

## 2022-10-14 NOTE — PLAN OF CARE
Problem: Discharge Planning  Goal: Discharge to home or other facility with appropriate resources  Outcome: Progressing  Flowsheets (Taken 10/14/2022 1846)  Discharge to home or other facility with appropriate resources:   Identify barriers to discharge with patient and caregiver   Arrange for needed discharge resources and transportation as appropriate   Identify discharge learning needs (meds, wound care, etc)   Arrange for interpreters to assist at discharge as needed   Refer to discharge planning if patient needs post-hospital services based on physician order or complex needs related to functional status, cognitive ability or social support system  Note: Inform pt. Of discharge teaching and planned. Instructed pt. To inform me if further teaching need to be done. Problem: Safety - Adult  Goal: Free from fall injury  Outcome: Progressing  Flowsheets (Taken 10/14/2022 1846)  Free From Fall Injury:   Instruct family/caregiver on patient safety   Based on caregiver fall risk screen, instruct family/caregiver to ask for assistance with transferring infant if caregiver noted to have fall risk factors  Note: Made sure call light was in reach, a clear pathway and adequate lighting was provided. Also made sure that the pt. Was wearing non-slip socks.        Problem: Pain  Goal: Verbalizes/displays adequate comfort level or baseline comfort level  Outcome: Progressing  Flowsheets (Taken 10/14/2022 1846)  Verbalizes/displays adequate comfort level or baseline comfort level:   Encourage patient to monitor pain and request assistance   Assess pain using appropriate pain scale   Administer analgesics based on type and severity of pain and evaluate response   Implement non-pharmacological measures as appropriate and evaluate response   Consider cultural and social influences on pain and pain management   Notify Licensed Independent Practitioner if interventions unsuccessful or patient reports new pain  Note: PT. Received pain meds in timely manner. Teach pt. Non-pharm. Methods to handle pain.

## 2022-10-15 PROCEDURE — 2580000003 HC RX 258: Performed by: FAMILY MEDICINE

## 2022-10-15 PROCEDURE — 6370000000 HC RX 637 (ALT 250 FOR IP): Performed by: FAMILY MEDICINE

## 2022-10-15 PROCEDURE — 6370000000 HC RX 637 (ALT 250 FOR IP): Performed by: PSYCHIATRY & NEUROLOGY

## 2022-10-15 PROCEDURE — 1200000000 HC SEMI PRIVATE

## 2022-10-15 RX ADMIN — GALANTAMINE 8 MG: 4 TABLET, FILM COATED ORAL at 18:11

## 2022-10-15 RX ADMIN — ASPIRIN 81 MG: 81 TABLET, COATED ORAL at 07:44

## 2022-10-15 RX ADMIN — CARVEDILOL 6.25 MG: 6.25 TABLET, FILM COATED ORAL at 07:44

## 2022-10-15 RX ADMIN — SODIUM CHLORIDE, PRESERVATIVE FREE 10 ML: 5 INJECTION INTRAVENOUS at 20:50

## 2022-10-15 RX ADMIN — Medication 9 MG: at 18:11

## 2022-10-15 RX ADMIN — DEXTROSE AND SODIUM CHLORIDE: 5; 450 INJECTION, SOLUTION INTRAVENOUS at 16:57

## 2022-10-15 RX ADMIN — QUETIAPINE FUMARATE 100 MG: 100 TABLET ORAL at 20:50

## 2022-10-15 RX ADMIN — CARVEDILOL 6.25 MG: 6.25 TABLET, FILM COATED ORAL at 18:11

## 2022-10-15 RX ADMIN — TAMSULOSIN HYDROCHLORIDE 0.4 MG: 0.4 CAPSULE ORAL at 07:44

## 2022-10-15 RX ADMIN — ALLOPURINOL 300 MG: 300 TABLET ORAL at 07:44

## 2022-10-15 RX ADMIN — GALANTAMINE 8 MG: 4 TABLET, FILM COATED ORAL at 07:46

## 2022-10-15 ASSESSMENT — ENCOUNTER SYMPTOMS
FACIAL SWELLING: 0
PHOTOPHOBIA: 0
VOICE CHANGE: 0
CHOKING: 0
ABDOMINAL DISTENTION: 0
EYE ITCHING: 0
STRIDOR: 0
COLOR CHANGE: 0
CHEST TIGHTNESS: 0
APNEA: 0
RECTAL PAIN: 0
ANAL BLEEDING: 0

## 2022-10-15 NOTE — PROGRESS NOTES
10/15/22 1144   Encounter Summary   Encounter Overview/Reason  Spiritual/Emotional Needs   Service Provided For: Patient   Referral/Consult From: Palliative Care   Last Encounter  10/15/22   Complexity of Encounter Low   Spiritual/Emotional needs   Type Spiritual Support   Palliative Care   Type Palliative Care, Follow-up   Assessment/Intervention/Outcome   Assessment Unable to assess   Intervention Prayer (assurance of)/[place]

## 2022-10-15 NOTE — PROGRESS NOTES
Progress Note    10/15/2022   9:22 AM    Name:  Shyam Montero  MRN:    [id number]     Acct:     [de-identified]   Room:  2069/2069-01   Day: 12     Admit Date: 10/3/2022 10:05 AM  PCP: Ludwig Baum MD    Subjective:     C/C:   Chief Complaint   Patient presents with    Altered Mental Status       Interval History: Status: not changed. Patient still has generalized weakness   Denies any worsening abdominal pain    ROS:   all 10 systems reviewed and are negative except as noted    Review of Systems   Constitutional:  Positive for fatigue. Negative for appetite change. HENT:  Negative for drooling, facial swelling, mouth sores, sneezing and voice change. Eyes:  Negative for photophobia and itching. Respiratory:  Negative for apnea, choking, chest tightness and stridor. Cardiovascular:  Negative for chest pain, palpitations and leg swelling. Gastrointestinal:  Negative for abdominal distention, anal bleeding and rectal pain. Endocrine: Negative for polydipsia and polyuria. Genitourinary:  Negative for difficulty urinating, flank pain, frequency and urgency. Musculoskeletal:  Negative for gait problem, joint swelling, myalgias and neck stiffness. Skin:  Negative for color change and wound. Allergic/Immunologic: Negative for food allergies. Neurological:  Negative for seizures, facial asymmetry, weakness and headaches. Hematological:  Negative for adenopathy. Does not bruise/bleed easily. Medications: Allergies:    Allergies   Allergen Reactions    Other      \"HAY FEVER\"       Current Meds: dextrose 5 % and 0.45 % sodium chloride infusion, Continuous  carvedilol (COREG) tablet 6.25 mg, BID WC  LORazepam (ATIVAN) injection 0.5 mg, Q10 Min PRN  allopurinol (ZYLOPRIM) tablet 300 mg, Daily  aspirin EC tablet 81 mg, Daily  melatonin tablet 9 mg, QPM  [Held by provider] spironolactone (ALDACTONE) tablet 25 mg, Daily  tamsulosin (FLOMAX) capsule 0.4 mg, Daily  haloperidol lactate (HALDOL) injection 5 mg, Q8H PRN  QUEtiapine (SEROQUEL) tablet 100 mg, Nightly  galantamine (RAZADYNE) tablet 8 mg, BID WC  sodium chloride flush 0.9 % injection 5-40 mL, 2 times per day  sodium chloride flush 0.9 % injection 5-40 mL, PRN  0.9 % sodium chloride infusion, PRN  [Held by provider] enoxaparin Sodium (LOVENOX) injection 30 mg, Daily  acetaminophen (TYLENOL) tablet 650 mg, Q4H PRN  ondansetron (ZOFRAN-ODT) disintegrating tablet 4 mg, Q8H PRN   Or  ondansetron (ZOFRAN) injection 4 mg, Q6H PRN        Data:     Code Status:  DNR-CCA    Family History   Family history unknown: Yes       Social History     Socioeconomic History    Marital status:      Spouse name: Not on file    Number of children: Not on file    Years of education: Not on file    Highest education level: Not on file   Occupational History    Not on file   Tobacco Use    Smoking status: Former     Packs/day: 0.50     Years: 15.00     Pack years: 7.50     Types: Cigarettes     Start date: 3/17/1960     Quit date: 3/17/1975     Years since quittin.6    Smokeless tobacco: Never   Vaping Use    Vaping Use: Never used   Substance and Sexual Activity    Alcohol use: No    Drug use: No    Sexual activity: Not on file   Other Topics Concern    Not on file   Social History Narrative    Not on file     Social Determinants of Health     Financial Resource Strain: Low Risk     Difficulty of Paying Living Expenses: Not hard at all   Food Insecurity: No Food Insecurity    Worried About Running Out of Food in the Last Year: Never true    Ran Out of Food in the Last Year: Never true   Transportation Needs: Not on file   Physical Activity: Not on file   Stress: Not on file   Social Connections: Not on file   Intimate Partner Violence: Not on file   Housing Stability: Not on file       I/O (24Hr):     Intake/Output Summary (Last 24 hours) at 10/15/2022 5687  Last data filed at 10/14/2022 1916  Gross per 24 hour   Intake 4056.1 ml   Output --   Net 4056.1 ml     Radiology:  No results found. Labs:  No results found for this or any previous visit (from the past 24 hour(s)). Physical Examination:        Vitals:  BP (!) 148/78   Pulse 60   Temp 97.5 °F (36.4 °C)   Resp 18   Ht 5' 11\" (1.803 m)   Wt 206 lb (93.4 kg)   SpO2 97%   BMI 28.73 kg/m²   Temp (24hrs), Av.2 °F (36.8 °C), Min:97.5 °F (36.4 °C), Max:98.8 °F (37.1 °C)    No results for input(s): POCGLU in the last 72 hours. Physical Exam  Vitals reviewed. Constitutional:       General: He is not in acute distress. Appearance: He is well-developed. He is not diaphoretic. HENT:      Head: Normocephalic and atraumatic. Right Ear: External ear normal.      Left Ear: External ear normal.      Nose: Nose normal.      Mouth/Throat:      Pharynx: Oropharynx is clear. Eyes:      General: Lids are normal.      Conjunctiva/sclera: Conjunctivae normal.   Neck:      Trachea: No tracheal deviation. Cardiovascular:      Rate and Rhythm: Normal rate and regular rhythm. Heart sounds: Normal heart sounds, S1 normal and S2 normal.   Pulmonary:      Effort: Pulmonary effort is normal. No respiratory distress. Breath sounds: Normal breath sounds. No decreased breath sounds, wheezing or rhonchi. Abdominal:      General: Bowel sounds are normal. There is no distension. Palpations: Abdomen is soft. Tenderness: There is no abdominal tenderness. Musculoskeletal:         General: No tenderness or deformity. Cervical back: Normal range of motion and neck supple. Lymphadenopathy:      Cervical: No cervical adenopathy. Skin:     General: Skin is warm and dry. Capillary Refill: Capillary refill takes less than 2 seconds. Nails: There is no clubbing. Neurological:      Mental Status: He is alert. Mental status is at baseline. Motor: No abnormal muscle tone or seizure activity.       Coordination: Coordination normal.       Assessment:        Primary Problem  AMS (altered mental status)     Principal Problem:    AMS (altered mental status)  Active Problems:    Acute encephalopathy    Memory loss    Cognitive impairment  Resolved Problems:    * No resolved hospital problems. *      Past Medical History:   Diagnosis Date    Arthritis     Aspirin long-term use     Back pain     Chronic fatigue 10/7/2020    Chronic kidney disease     CKD (chronic kidney disease), stage III ([facility] [address] 8/9/2017    Baseline creatinine 1.4-1.5. Proteinuria reported by primary physician. Workup in progress. Essential hypertension 8/9/2017    Fall at home 12/09/2019    Family history of prostate problems     Heart disease     Hiatal hernia     HTN (hypertension)     Hyperlipemia     Interstitial nephritis chronic 9/28/2020    Kidney bx 9/25/20 shows chronic interstitial nephritis with hypertensive changes. Likely cause NSAIDs     Isolated proteinuria 8/9/2017    Nonnephrotic, being quantified. Kidney disease, chronic, stage IV (severe, EGFR 15-29 ml/min) ([facility]) 8/9/2017    Baseline creatinine 1.4-1.5. Proteinuria reported by primary physician. Likely from long term NSAID use. Workup for immune complex, paraprotein disease negative. Proteinuria fluctuates between 1-2 g. 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    Appears to be primary    MVP (mitral valve prolapse)     Neck pain     Nephrotic syndrome 10/7/2020    NSAID long-term use 8/9/2017    Primary osteoarthritis of hand 8/9/2017    Urination, excessive at night     Vascular dementia without behavioral disturbance ([facility] [address] 9/7/2022        Plan:          On D5 half-normal saline   Seroquel   Awaiting ECF placement  PPI  DVT Prophylaxis  EPCs  PT/OT to evaluate and treat  Pain control  Replace electrolytes as per sliding scale  Home medications reviewed and appropriate medications continued  Reviewed labs and imaging studies from last 24 hours and results explained to patient      Electronically signed by Carol Ospina MD

## 2022-10-15 NOTE — PROGRESS NOTES
NEPHROLOGY progress note    Patient :  Cathy Jack; 65 y.o. MRN# [id number]  Location:  2069/2069-01  Attending:  Tana Pulido MD  Admit Date:  10/3/2022   Hospital Day: 15      Reason for Consult:   acute kidney injury on CKD      Chief Complaint:   change in mental status , combative behavior      Subjective/interval history. Patient seen and examined patient remains comfortable, confused withdrawn, nonverbal.  Awake responsive  Serum creatinine improved t blood pressure stable    History of Present Illness: This is a 65 y.o. 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 Rios Thorne with baseline serum creatinine 1.9-2.2 mg/dL. Patient presented to the hospital  on 10/03/2022 with complains of change in mental status more confusion and combative behavior. On admission serum creatinine was noted to be 2.4 mg/dL which  peaked to 3.5 mg/dL on 10/11/2022. Patient has not been eating or drinking much poor oral intake and appetite   Patient's blood pressure has been stable   CT head no acute intracranial abnormality   Chest x-ray no confluent airspace consolidation    Past Medical History:        Diagnosis Date    Arthritis     Aspirin long-term use     Back pain     Chronic fatigue 10/7/2020    Chronic kidney disease     CKD (chronic kidney disease), stage III ([facility] [address] 8/9/2017    Baseline creatinine 1.4-1.5. Proteinuria reported by primary physician. Workup in progress. Essential hypertension 8/9/2017    Fall at home 12/09/2019    Family history of prostate problems     Heart disease     Hiatal hernia     HTN (hypertension)     Hyperlipemia     Interstitial nephritis chronic 9/28/2020    Kidney bx 9/25/20 shows chronic interstitial nephritis with hypertensive changes. Likely cause NSAIDs     Isolated proteinuria 8/9/2017    Nonnephrotic, being quantified.     Kidney disease, chronic, stage IV (severe, EGFR 15-29 ml/min) ([facility]) 8/9/2017    Baseline creatinine 1.4-1.5. Proteinuria reported by primary physician. Likely from long term NSAID use. Workup for immune complex, paraprotein disease negative. Proteinuria fluctuates between 1-2 g.  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    Appears to be primary    MVP (mitral valve prolapse)     Neck pain     Nephrotic syndrome 10/7/2020    NSAID long-term use 8/9/2017    Primary osteoarthritis of hand 8/9/2017    Urination, excessive at night     Vascular dementia without behavioral disturbance ([facility] [address] 9/7/2022       Past Surgical History:        Procedure Laterality Date    ANKLE SURGERY      COLONOSCOPY      CT BIOPSY RENAL  9/22/2020    CT BIOPSY RENAL 9/22/2020 [facility] PROCEDURES    INGUINAL HERNIA REPAIR  12/1/08    [facility]    LUMBAR DISC SURGERY      NECK SURGERY      SPINE SURGERY      TONSILLECTOMY AND ADENOIDECTOMY         Current Medications:    dextrose 5 % and 0.45 % sodium chloride infusion, Continuous  carvedilol (COREG) tablet 6.25 mg, BID WC  LORazepam (ATIVAN) injection 0.5 mg, Q10 Min PRN  allopurinol (ZYLOPRIM) tablet 300 mg, Daily  aspirin EC tablet 81 mg, Daily  melatonin tablet 9 mg, QPM  [Held by provider] spironolactone (ALDACTONE) tablet 25 mg, Daily  tamsulosin (FLOMAX) capsule 0.4 mg, Daily  haloperidol lactate (HALDOL) injection 5 mg, Q8H PRN  QUEtiapine (SEROQUEL) tablet 100 mg, Nightly  galantamine (RAZADYNE) tablet 8 mg, BID WC  sodium chloride flush 0.9 % injection 5-40 mL, 2 times per day  sodium chloride flush 0.9 % injection 5-40 mL, PRN  0.9 % sodium chloride infusion, PRN  [Held by provider] enoxaparin Sodium (LOVENOX) injection 30 mg, Daily  acetaminophen (TYLENOL) tablet 650 mg, Q4H PRN  ondansetron (ZOFRAN-ODT) disintegrating tablet 4 mg, Q8H PRN   Or  ondansetron (ZOFRAN) injection 4 mg, Q6H PRN      Allergies:   Other    Social History:   Social History     Socioeconomic History    Marital status:      Spouse name: Not on file    Number of children: Not on file    Years of education: Not on file    Highest education level: Not on file   Occupational History    Not on file   Tobacco Use    Smoking status: Former     Packs/day: 0.50     Years: 15.00     Pack years: 7.50     Types: Cigarettes     Start date: 3/17/1960     Quit date: 3/17/1975     Years since quittin.6    Smokeless tobacco: Never   Vaping Use    Vaping Use: Never used   Substance and Sexual Activity    Alcohol use: No    Drug use: No    Sexual activity: Not on file   Other Topics Concern    Not on file   Social History Narrative    Not on file     Social Determinants of Health     Financial Resource Strain: Low Risk     Difficulty of Paying Living Expenses: Not hard at all   Food Insecurity: No Food Insecurity    Worried About Running Out of Food in the Last Year: Never true    Ran Out of Food in the Last Year: Never true   Transportation Needs: Not on file   Physical Activity: Not on file   Stress: Not on file   Social Connections: Not on file   Intimate Partner Violence: Not on file   Housing Stability: Not on file       Family History:   Family History   Family history unknown: Yes       Review of Systems:    unobtainable      Objective:  CURRENT TEMPERATURE:  Temp: 97.5 °F (36.4 °C)  MAXIMUM TEMPERATURE OVER 24HRS:  Temp (24hrs), Av.2 °F (36.8 °C), Min:97.5 °F (36.4 °C), Max:98.8 °F (37.1 °C)    CURRENT RESPIRATORY RATE:  Resp: 18  CURRENT PULSE:  Heart Rate: 60  CURRENT BLOOD PRESSURE:  BP: (!) 148/78  24HR BLOOD PRESSURE RANGE:  Systolic (68JUD), KKS:948 , Min:125 , RRH:965   ; Diastolic (65EJV), MXK:08, Min:78, Max:99    24HR INTAKE/OUTPUT:    Intake/Output Summary (Last 24 hours) at 10/15/2022 1748  Last data filed at 10/14/2022 1916  Gross per 24 hour   Intake 4056.1 ml   Output --   Net 4056.1 ml     No data found. Physical Exam:  GENERAL APPEARANCE: Alert and cooperative, and appears to be in no acute distress. HEAD: normocephalic  EYES: EOMI. Not pale, anicteric   NOSE:  No nasal discharge. THROAT:  Oral cavity and pharynx normal. Moist  CARDIAC: Normal S1 and S2. No S3, S4 or murmurs. Rhythm is regular. LUNGS: Clear to auscultation and percussion without rales, rhonchi, wheezing or diminished breath sounds. GI-SOFT NT,  SOFT    MUSKULOSKELETAL: Adequately aligned spine. No joint erythema or tenderness. EXTREMITIES: No edema. Peripheral pulses intact. NEURO: Non focal      Labs:   CBC:  Recent Labs     10/13/22  0617 10/14/22  0646   WBC 11.5* 14.4*   RBC 4.10* 4.25*   HGB 12.7* 13.4*   HCT 39.1* 40.3*   MCV 95.5 94.7   MCH 30.9 31.5   MCHC 32.4 33.3   RDW 14.5 14.4    267   MPV 9.6 9.3      BMP:   Recent Labs     10/13/22  0617 10/14/22  0646    139   K 4.2 4.9   * 106   CO2 20 22   BUN 84* 69*   CREATININE 3.16* 2.72*   GLUCOSE 130* 125*   CALCIUM 9.3 9.5      Phosphorus:  No results for input(s): PHOS in the last 72 hours. Magnesium: No results for input(s): MG in the last 72 hours. Albumin: No results for input(s): LABALBU in the last 72 hours. IRON:  No results for input(s): IRON in the last 72 hours. Iron Saturation:  Invalid input(s): PERCENTFE  TIBC:  No results for input(s): TIBC in the last 72 hours. FERRITIN:  No results for input(s): FERRITIN in the last 72 hours. SPEP: No results for input(s): SPEP in the last 72 hours. No results for input(s): PROT, ALBCAL, ALBCAL, ALBPCT, LABALPH, A1PCT, LABALPH, A2PCT, LABBETA, BETAPCT, GAMGLOB, GGPCT, PATH in the last 72 hours. UPEP: No results for input(s): TPU in the last 72 hours. Urine Sodium:    Recent Labs     10/14/22  0703   HEATHER 54      Urine Potassium: No results for input(s): KUR in the last 72 hours.   Urine Chloride:  No results for input(s): CLU in the last 72 hours. Urine Ph:  Invalid input(s): PO4U  Urine Osmolarity: No results for input(s): OSMOU in the last 72 hours. Urine Creatinine:    Recent Labs     10/14/22  0703   LABCREA 94.8     Urine Eosinophils: Invalid input(s): EOSU  Urine Protein:  No results for input(s): TPU in the last 72 hours. Urinalysis:  No results for input(s): Qamar Grand, PHUR, LABCAST, WBCUA, RBCUA, MUCUS, TRICHOMONAS, YEAST, BACTERIA, CLARITYU, SPECGRAV, LEUKOCYTESUR, UROBILINOGEN, BILIRUBINUR, BLOODU, GLUCOSEU, KETUA, AMORPHOUS in the last 72 hours. Radiology:  Reviewed as available. Assessment:   MILAGRO  ON CKD  most likely secondary to prerenal azotemia due to poor oral intake, rule out urinary retention, serum creatinine peaked to 3.5 mg/dL which is trending down to 2.7 mg/dL   History of CKD 4 secondary to MPGN biopsy-proven baseline serum creatinine of 1.9-2.2 mg/dL follows up with Dr. Jillian Grey   History of essential hypertension   Acute encephalopathy, most likely secondary to underlying dementia       Plan:       Discharge planning in progress for  ECF      Thank you for the consultation.       Electronically signed by Christiano Soares MD on 10/15/2022 at 5:48 PM

## 2022-10-16 PROCEDURE — 2580000003 HC RX 258: Performed by: FAMILY MEDICINE

## 2022-10-16 PROCEDURE — 1200000000 HC SEMI PRIVATE

## 2022-10-16 PROCEDURE — 6370000000 HC RX 637 (ALT 250 FOR IP): Performed by: PSYCHIATRY & NEUROLOGY

## 2022-10-16 PROCEDURE — 6370000000 HC RX 637 (ALT 250 FOR IP): Performed by: FAMILY MEDICINE

## 2022-10-16 RX ADMIN — ASPIRIN 81 MG: 81 TABLET, COATED ORAL at 09:12

## 2022-10-16 RX ADMIN — ALLOPURINOL 300 MG: 300 TABLET ORAL at 09:12

## 2022-10-16 RX ADMIN — CARVEDILOL 6.25 MG: 6.25 TABLET, FILM COATED ORAL at 09:12

## 2022-10-16 RX ADMIN — GALANTAMINE 8 MG: 4 TABLET, FILM COATED ORAL at 09:13

## 2022-10-16 RX ADMIN — SODIUM CHLORIDE, PRESERVATIVE FREE 10 ML: 5 INJECTION INTRAVENOUS at 19:48

## 2022-10-16 RX ADMIN — Medication 9 MG: at 17:18

## 2022-10-16 RX ADMIN — QUETIAPINE FUMARATE 100 MG: 100 TABLET ORAL at 19:48

## 2022-10-16 RX ADMIN — GALANTAMINE 8 MG: 4 TABLET, FILM COATED ORAL at 17:18

## 2022-10-16 RX ADMIN — TAMSULOSIN HYDROCHLORIDE 0.4 MG: 0.4 CAPSULE ORAL at 09:12

## 2022-10-16 RX ADMIN — DEXTROSE AND SODIUM CHLORIDE: 5; 450 INJECTION, SOLUTION INTRAVENOUS at 17:14

## 2022-10-16 ASSESSMENT — ENCOUNTER SYMPTOMS
STRIDOR: 0
VOICE CHANGE: 0
APNEA: 0
RECTAL PAIN: 0
CHEST TIGHTNESS: 0
COLOR CHANGE: 0
ABDOMINAL DISTENTION: 0
CHOKING: 0
EYE ITCHING: 0
FACIAL SWELLING: 0
ANAL BLEEDING: 0
PHOTOPHOBIA: 0

## 2022-10-16 ASSESSMENT — PAIN SCALES - GENERAL: PAINLEVEL_OUTOF10: 0

## 2022-10-16 NOTE — PROGRESS NOTES
Progress Note    10/16/2022   9:30 AM    Name:  Justine Ramirez  MRN:    [id number]     Acct:     [de-identified]   Room:  2069/2069-01   Day: 15     Admit Date: 10/3/2022 10:05 AM  PCP: Deidre Zhu MD    Subjective:     C/C:   Chief Complaint   Patient presents with    Altered Mental Status       Interval History: Status: not changed. Patient still has generalized weakness    ROS:   all 10 systems reviewed and are negative except as noted    Review of Systems   Constitutional:  Positive for fatigue. Negative for appetite change. HENT:  Negative for drooling, facial swelling, mouth sores, sneezing and voice change. Eyes:  Negative for photophobia and itching. Respiratory:  Negative for apnea, choking, chest tightness and stridor. Cardiovascular:  Negative for chest pain, palpitations and leg swelling. Gastrointestinal:  Negative for abdominal distention, anal bleeding and rectal pain. Endocrine: Negative for polydipsia and polyuria. Genitourinary:  Negative for difficulty urinating, flank pain, frequency and urgency. Musculoskeletal:  Negative for gait problem, joint swelling, myalgias and neck stiffness. Skin:  Negative for color change and wound. Allergic/Immunologic: Negative for food allergies. Neurological:  Negative for seizures, facial asymmetry, weakness and headaches. Hematological:  Negative for adenopathy. Does not bruise/bleed easily. Medications: Allergies:    Allergies   Allergen Reactions    Other      \"HAY FEVER\"       Current Meds: dextrose 5 % and 0.45 % sodium chloride infusion, Continuous  carvedilol (COREG) tablet 6.25 mg, BID WC  LORazepam (ATIVAN) injection 0.5 mg, Q10 Min PRN  allopurinol (ZYLOPRIM) tablet 300 mg, Daily  aspirin EC tablet 81 mg, Daily  melatonin tablet 9 mg, QPM  [Held by provider] spironolactone (ALDACTONE) tablet 25 mg, Daily  tamsulosin (FLOMAX) capsule 0.4 mg, Daily  haloperidol lactate (HALDOL) injection 5 mg, Q8H PRN  QUEtiapine (SEROQUEL) tablet 100 mg, Nightly  galantamine (RAZADYNE) tablet 8 mg, BID WC  sodium chloride flush 0.9 % injection 5-40 mL, 2 times per day  sodium chloride flush 0.9 % injection 5-40 mL, PRN  0.9 % sodium chloride infusion, PRN  [Held by provider] enoxaparin Sodium (LOVENOX) injection 30 mg, Daily  acetaminophen (TYLENOL) tablet 650 mg, Q4H PRN  ondansetron (ZOFRAN-ODT) disintegrating tablet 4 mg, Q8H PRN   Or  ondansetron (ZOFRAN) injection 4 mg, Q6H PRN        Data:     Code Status:  DNR-CCA    Family History   Family history unknown: Yes       Social History     Socioeconomic History    Marital status:      Spouse name: Not on file    Number of children: Not on file    Years of education: Not on file    Highest education level: Not on file   Occupational History    Not on file   Tobacco Use    Smoking status: Former     Packs/day: 0.50     Years: 15.00     Pack years: 7.50     Types: Cigarettes     Start date: 3/17/1960     Quit date: 3/17/1975     Years since quittin.6    Smokeless tobacco: Never   Vaping Use    Vaping Use: Never used   Substance and Sexual Activity    Alcohol use: No    Drug use: No    Sexual activity: Not on file   Other Topics Concern    Not on file   Social History Narrative    Not on file     Social Determinants of Health     Financial Resource Strain: Low Risk     Difficulty of Paying Living Expenses: Not hard at all   Food Insecurity: No Food Insecurity    Worried About Running Out of Food in the Last Year: Never true    Ran Out of Food in the Last Year: Never true   Transportation Needs: Not on file   Physical Activity: Not on file   Stress: Not on file   Social Connections: Not on file   Intimate Partner Violence: Not on file   Housing Stability: Not on file       I/O (24Hr): No intake or output data in the 24 hours ending 10/16/22 0930  Radiology:  No results found. Labs:  No results found for this or any previous visit (from the past 24 hour(s)).     Physical Examination:        Vitals:  BP (!) 170/79   Pulse 66   Temp 98.6 °F (37 °C) (Axillary)   Resp 14   Ht 5' 11\" (1.803 m)   Wt 206 lb (93.4 kg)   SpO2 98%   BMI 28.73 kg/m²   Temp (24hrs), Av.7 °F (37.1 °C), Min:98.6 °F (37 °C), Max:98.8 °F (37.1 °C)    No results for input(s): POCGLU in the last 72 hours. Physical Exam  Vitals reviewed. Constitutional:       General: He is not in acute distress. Appearance: He is well-developed. He is not diaphoretic. HENT:      Head: Normocephalic and atraumatic. Right Ear: External ear normal.      Left Ear: External ear normal.      Nose: Nose normal.      Mouth/Throat:      Pharynx: Oropharynx is clear. Eyes:      General: Lids are normal.      Conjunctiva/sclera: Conjunctivae normal.   Neck:      Trachea: No tracheal deviation. Cardiovascular:      Rate and Rhythm: Normal rate and regular rhythm. Heart sounds: Normal heart sounds, S1 normal and S2 normal.   Pulmonary:      Effort: Pulmonary effort is normal. No respiratory distress. Breath sounds: Normal breath sounds. No decreased breath sounds, wheezing or rhonchi. Abdominal:      General: Bowel sounds are normal. There is no distension. Palpations: Abdomen is soft. Tenderness: There is no abdominal tenderness. Musculoskeletal:         General: No tenderness or deformity. Cervical back: Normal range of motion and neck supple. Lymphadenopathy:      Cervical: No cervical adenopathy. Skin:     General: Skin is warm and dry. Capillary Refill: Capillary refill takes less than 2 seconds. Nails: There is no clubbing. Neurological:      Mental Status: He is alert. Mental status is at baseline. Motor: No abnormal muscle tone or seizure activity.       Coordination: Coordination normal.       Assessment:        Primary Problem  AMS (altered mental status)     Principal Problem:    AMS (altered mental status)  Active Problems:    Acute encephalopathy Memory loss    Cognitive impairment  Resolved Problems:    * No resolved hospital problems. *      Past Medical History:   Diagnosis Date    Arthritis     Aspirin long-term use     Back pain     Chronic fatigue 10/7/2020    Chronic kidney disease     CKD (chronic kidney disease), stage III ([facility] [address] 8/9/2017    Baseline creatinine 1.4-1.5. Proteinuria reported by primary physician. Workup in progress. Essential hypertension 8/9/2017    Fall at home 12/09/2019    Family history of prostate problems     Heart disease     Hiatal hernia     HTN (hypertension)     Hyperlipemia     Interstitial nephritis chronic 9/28/2020    Kidney bx 9/25/20 shows chronic interstitial nephritis with hypertensive changes. Likely cause NSAIDs     Isolated proteinuria 8/9/2017    Nonnephrotic, being quantified. Kidney disease, chronic, stage IV (severe, EGFR 15-29 ml/min) ([facility]) 8/9/2017    Baseline creatinine 1.4-1.5. Proteinuria reported by primary physician. Likely from long term NSAID use. Workup for immune complex, paraprotein disease negative. Proteinuria fluctuates between 1-2 g. 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    Appears to be primary    MVP (mitral valve prolapse)     Neck pain     Nephrotic syndrome 10/7/2020    NSAID long-term use 8/9/2017    Primary osteoarthritis of hand 8/9/2017    Urination, excessive at night     Vascular dementia without behavioral disturbance ([facility] [address] 9/7/2022        Plan:         On D5 half-normal saline   Seroquel   Awaiting ECF placement  PPI  DVT Prophylaxis  EPCs  PT/OT to evaluate and treat  Pain control  Replace electrolytes as per sliding scale  Home medications reviewed and appropriate medications continued  Reviewed labs and imaging studies from last 24 hours and results       Electronically signed by Nirali Rojas MD

## 2022-10-16 NOTE — CARE COORDINATION
ONGOING DISCHARGE PLANNING NOTE:    Writer reviewed LSW notes, and discharge plan is to still follow for Expediated appeal on Monday.      Electronically signed by Karina Farah RN on 10/16/2022 at 4:03 PM

## 2022-10-16 NOTE — PROGRESS NOTES
NEPHROLOGY progress note    Patient :  Katie Donovan; 65 y.o. MRN# [id number]  Location:  2069/2069-01  Attending:  Landry Kellogg MD  Admit Date:  10/3/2022   Hospital Day: 15      Reason for Consult:   acute kidney injury on CKD      Chief Complaint:   change in mental status , combative behavior      Subjective/interval history. Patient seen and examined patient remains comfortable, confused withdrawn, nonverbal.  Awake responsive  Serum creatinine improved to 2.7 mg/dL blood pressure stable    History of Present Illness: This is a 65 y.o. 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 Carin Mckinley with baseline serum creatinine 1.9-2.2 mg/dL. Patient presented to the hospital  on 10/03/2022 with complains of change in mental status more confusion and combative behavior. On admission serum creatinine was noted to be 2.4 mg/dL which  peaked to 3.5 mg/dL on 10/11/2022. Patient has not been eating or drinking much poor oral intake and appetite   Patient's blood pressure has been stable   CT head no acute intracranial abnormality   Chest x-ray no confluent airspace consolidation    Past Medical History:        Diagnosis Date    Arthritis     Aspirin long-term use     Back pain     Chronic fatigue 10/7/2020    Chronic kidney disease     CKD (chronic kidney disease), stage III ([facility] [address] 8/9/2017    Baseline creatinine 1.4-1.5. Proteinuria reported by primary physician. Workup in progress. Essential hypertension 8/9/2017    Fall at home 12/09/2019    Family history of prostate problems     Heart disease     Hiatal hernia     HTN (hypertension)     Hyperlipemia     Interstitial nephritis chronic 9/28/2020    Kidney bx 9/25/20 shows chronic interstitial nephritis with hypertensive changes. Likely cause NSAIDs     Isolated proteinuria 8/9/2017    Nonnephrotic, being quantified.     Kidney disease, chronic, stage IV (severe, EGFR 15-29 ml/min) ([facility]) 8/9/2017    Baseline creatinine 1.4-1.5. Proteinuria reported by primary physician. Likely from long term NSAID use. Workup for immune complex, paraprotein disease negative. Proteinuria fluctuates between 1-2 g.  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    Appears to be primary    MVP (mitral valve prolapse)     Neck pain     Nephrotic syndrome 10/7/2020    NSAID long-term use 8/9/2017    Primary osteoarthritis of hand 8/9/2017    Urination, excessive at night     Vascular dementia without behavioral disturbance ([facility] [address] 9/7/2022       Past Surgical History:        Procedure Laterality Date    ANKLE SURGERY      COLONOSCOPY      CT BIOPSY RENAL  9/22/2020    CT BIOPSY RENAL 9/22/2020 [facility] SPECIAL PROCEDURES    INGUINAL HERNIA REPAIR  12/1/08    [facility]    LUMBAR DISC SURGERY      NECK SURGERY      SPINE SURGERY      TONSILLECTOMY AND ADENOIDECTOMY         Current Medications:    dextrose 5 % and 0.45 % sodium chloride infusion, Continuous  carvedilol (COREG) tablet 6.25 mg, BID WC  LORazepam (ATIVAN) injection 0.5 mg, Q10 Min PRN  allopurinol (ZYLOPRIM) tablet 300 mg, Daily  aspirin EC tablet 81 mg, Daily  melatonin tablet 9 mg, QPM  [Held by provider] spironolactone (ALDACTONE) tablet 25 mg, Daily  tamsulosin (FLOMAX) capsule 0.4 mg, Daily  haloperidol lactate (HALDOL) injection 5 mg, Q8H PRN  QUEtiapine (SEROQUEL) tablet 100 mg, Nightly  galantamine (RAZADYNE) tablet 8 mg, BID WC  sodium chloride flush 0.9 % injection 5-40 mL, 2 times per day  sodium chloride flush 0.9 % injection 5-40 mL, PRN  0.9 % sodium chloride infusion, PRN  [Held by provider] enoxaparin Sodium (LOVENOX) injection 30 mg, Daily  acetaminophen (TYLENOL) tablet 650 mg, Q4H PRN  ondansetron (ZOFRAN-ODT) disintegrating tablet 4 mg, Q8H PRN   Or  ondansetron (ZOFRAN) injection 4 mg, Q6H PRN      Allergies:   Other    Social History:   Social History     Socioeconomic History    Marital status:      Spouse name: Not on file    Number of children: Not on file    Years of education: Not on file    Highest education level: Not on file   Occupational History    Not on file   Tobacco Use    Smoking status: Former     Packs/day: 0.50     Years: 15.00     Pack years: 7.50     Types: Cigarettes     Start date: 3/17/1960     Quit date: 3/17/1975     Years since quittin.6    Smokeless tobacco: Never   Vaping Use    Vaping Use: Never used   Substance and Sexual Activity    Alcohol use: No    Drug use: No    Sexual activity: Not on file   Other Topics Concern    Not on file   Social History Narrative    Not on file     Social Determinants of Health     Financial Resource Strain: Low Risk     Difficulty of Paying Living Expenses: Not hard at all   Food Insecurity: No Food Insecurity    Worried About Running Out of Food in the Last Year: Never true    Ran Out of Food in the Last Year: Never true   Transportation Needs: Not on file   Physical Activity: Not on file   Stress: Not on file   Social Connections: Not on file   Intimate Partner Violence: Not on file   Housing Stability: Not on file           Objective:  CURRENT TEMPERATURE:  Temp: 98.6 °F (37 °C)  MAXIMUM TEMPERATURE OVER 24HRS:  Temp (24hrs), Av.7 °F (37.1 °C), Min:98.6 °F (37 °C), Max:98.8 °F (37.1 °C)    CURRENT RESPIRATORY RATE:  Resp: 14  CURRENT PULSE:  Heart Rate: 68  CURRENT BLOOD PRESSURE:  BP: 136/78  24HR BLOOD PRESSURE RANGE:  Systolic (28JEH), WTT:010 , Min:136 , SYP:760   ; Diastolic (56KVF), SYC:85, Min:78, Max:83    24HR INTAKE/OUTPUT:    Intake/Output Summary (Last 24 hours) at 10/16/2022 1441  Last data filed at 10/16/2022 0939  Gross per 24 hour   Intake 35 ml   Output --   Net 35 ml     No data found. Physical Exam:  GENERAL APPEARANCE: Alert and cooperative, and appears to be in no acute distress. HEAD: normocephalic  EYES: EOMI. Not pale, anicteric   NOSE:  No nasal discharge. THROAT:  Oral cavity and pharynx normal. Moist  CARDIAC: Normal S1 and S2. No S3, S4 or murmurs. Rhythm is regular. LUNGS: Clear to auscultation and percussion without rales, rhonchi, wheezing or diminished breath sounds. GI-SOFT NT,  SOFT    MUSKULOSKELETAL: Adequately aligned spine. No joint erythema or tenderness. EXTREMITIES: No edema. Peripheral pulses intact. NEURO: Non focal      Labs:   CBC:  Recent Labs     10/14/22  0646   WBC 14.4*   RBC 4.25*   HGB 13.4*   HCT 40.3*   MCV 94.7   MCH 31.5   MCHC 33.3   RDW 14.4      MPV 9.3      BMP:   Recent Labs     10/14/22  0646      K 4.9      CO2 22   BUN 69*   CREATININE 2.72*   GLUCOSE 125*   CALCIUM 9.5        Urine Sodium:    Recent Labs     10/14/22  0703   HEATHER 54      U  Urine Creatinine:    Recent Labs     10/14/22  0703   LABCREA 94.8           Radiology:  Reviewed as available. Assessment:   MILAGRO  ON CKD  most likely secondary to prerenal azotemia due to poor oral intake, rule out urinary retention, serum creatinine peaked to 3.5 mg/dL which is trending down to 2.7 mg/dL   History of CKD 4 secondary to MPGN biopsy-proven baseline serum creatinine of 1.9-2.2 mg/dL follows up with Dr. Alaniz Minus   History of essential hypertension   Acute encephalopathy, most likely secondary to underlying dementia       Plan:  Waiting for Approval for rehab     Discharge planning in progress for  [facility]      Thank you for the consultation.       Electronically signed by Vani Vela MD on 10/16/2022 at 2:41 PM

## 2022-10-17 LAB
ALBUMIN SERPL-MCNC: 2.6 G/DL (ref 3.5–5.2)
ALP BLD-CCNC: 30 U/L (ref 40–129)
ALT SERPL-CCNC: 28 U/L (ref 5–41)
ANION GAP SERPL CALCULATED.3IONS-SCNC: 10 MMOL/L (ref 9–17)
AST SERPL-CCNC: 33 U/L
BILIRUB SERPL-MCNC: 0.4 MG/DL (ref 0.3–1.2)
BUN BLDV-MCNC: 43 MG/DL (ref 8–23)
CALCIUM SERPL-MCNC: 8.8 MG/DL (ref 8.6–10.4)
CHLORIDE BLD-SCNC: 105 MMOL/L (ref 98–107)
CO2: 22 MMOL/L (ref 20–31)
CREAT SERPL-MCNC: 2.29 MG/DL (ref 0.7–1.2)
GFR SERPL CREATININE-BSD FRML MDRD: 29 ML/MIN/1.73M2
GLUCOSE BLD-MCNC: 126 MG/DL (ref 70–99)
HCT VFR BLD CALC: 39.5 % (ref 41–53)
HEMOGLOBIN: 12.9 G/DL (ref 13.5–17.5)
MCH RBC QN AUTO: 30.9 PG (ref 26–34)
MCHC RBC AUTO-ENTMCNC: 32.7 G/DL (ref 31–37)
MCV RBC AUTO: 94.4 FL (ref 80–100)
PDW BLD-RTO: 14.5 % (ref 11.5–14.9)
PHOSPHORUS: 3.9 MG/DL (ref 2.5–4.5)
PLATELET # BLD: 307 K/UL (ref 150–450)
PMV BLD AUTO: 8.8 FL (ref 6–12)
POTASSIUM SERPL-SCNC: 5.4 MMOL/L (ref 3.7–5.3)
RBC # BLD: 4.18 M/UL (ref 4.5–5.9)
SODIUM BLD-SCNC: 137 MMOL/L (ref 135–144)
TOTAL PROTEIN: 5.9 G/DL (ref 6.4–8.3)
WBC # BLD: 17.4 K/UL (ref 3.5–11)

## 2022-10-17 PROCEDURE — 80053 COMPREHEN METABOLIC PANEL: CPT

## 2022-10-17 PROCEDURE — 2580000003 HC RX 258: Performed by: FAMILY MEDICINE

## 2022-10-17 PROCEDURE — 1200000000 HC SEMI PRIVATE

## 2022-10-17 PROCEDURE — 84100 ASSAY OF PHOSPHORUS: CPT

## 2022-10-17 PROCEDURE — 85027 COMPLETE CBC AUTOMATED: CPT

## 2022-10-17 PROCEDURE — 6370000000 HC RX 637 (ALT 250 FOR IP): Performed by: PSYCHIATRY & NEUROLOGY

## 2022-10-17 PROCEDURE — 99213 OFFICE O/P EST LOW 20 MIN: CPT

## 2022-10-17 PROCEDURE — 51798 US URINE CAPACITY MEASURE: CPT

## 2022-10-17 PROCEDURE — 6370000000 HC RX 637 (ALT 250 FOR IP): Performed by: INTERNAL MEDICINE

## 2022-10-17 PROCEDURE — 6370000000 HC RX 637 (ALT 250 FOR IP): Performed by: FAMILY MEDICINE

## 2022-10-17 PROCEDURE — 36415 COLL VENOUS BLD VENIPUNCTURE: CPT

## 2022-10-17 RX ORDER — IODINE/SODIUM IODIDE 2 %
TINCTURE TOPICAL 2 TIMES DAILY
Status: DISCONTINUED | OUTPATIENT
Start: 2022-10-17 | End: 2022-10-20 | Stop reason: HOSPADM

## 2022-10-17 RX ORDER — ASPIRIN 81 MG/1
81 TABLET, CHEWABLE ORAL DAILY
Status: DISCONTINUED | OUTPATIENT
Start: 2022-10-17 | End: 2022-10-20 | Stop reason: HOSPADM

## 2022-10-17 RX ADMIN — SODIUM ZIRCONIUM CYCLOSILICATE 5 G: 5 POWDER, FOR SUSPENSION ORAL at 20:28

## 2022-10-17 RX ADMIN — ALLOPURINOL 300 MG: 300 TABLET ORAL at 08:49

## 2022-10-17 RX ADMIN — SODIUM ZIRCONIUM CYCLOSILICATE 5 G: 5 POWDER, FOR SUSPENSION ORAL at 17:24

## 2022-10-17 RX ADMIN — QUETIAPINE FUMARATE 100 MG: 100 TABLET ORAL at 20:28

## 2022-10-17 RX ADMIN — FERRIC OXIDE RED: 8; 8 LOTION TOPICAL at 20:31

## 2022-10-17 RX ADMIN — GALANTAMINE 8 MG: 4 TABLET, FILM COATED ORAL at 16:56

## 2022-10-17 RX ADMIN — CARVEDILOL 6.25 MG: 6.25 TABLET, FILM COATED ORAL at 16:56

## 2022-10-17 RX ADMIN — Medication 9 MG: at 16:56

## 2022-10-17 RX ADMIN — GALANTAMINE 8 MG: 4 TABLET, FILM COATED ORAL at 08:50

## 2022-10-17 RX ADMIN — TAMSULOSIN HYDROCHLORIDE 0.4 MG: 0.4 CAPSULE ORAL at 08:49

## 2022-10-17 RX ADMIN — DEXTROSE AND SODIUM CHLORIDE: 5; 450 INJECTION, SOLUTION INTRAVENOUS at 16:39

## 2022-10-17 RX ADMIN — CARVEDILOL 6.25 MG: 6.25 TABLET, FILM COATED ORAL at 08:49

## 2022-10-17 RX ADMIN — SODIUM CHLORIDE, PRESERVATIVE FREE 10 ML: 5 INJECTION INTRAVENOUS at 20:29

## 2022-10-17 NOTE — PLAN OF CARE
Problem: Discharge Planning  Goal: Discharge to home or other facility with appropriate resources  Outcome: Progressing  Flowsheets (Taken 10/17/2022 0845)  Discharge to home or other facility with appropriate resources: Identify barriers to discharge with patient and caregiver     Problem: Safety - Adult  Goal: Free from fall injury  Outcome: Progressing  Flowsheets (Taken 10/17/2022 0942)  Free From Fall Injury: Instruct family/caregiver on patient safety     Problem: Pain  Goal: Verbalizes/displays adequate comfort level or baseline comfort level  Outcome: Progressing     Problem: ABCDS Injury Assessment  Goal: Absence of physical injury  Outcome: Progressing  Flowsheets (Taken 10/17/2022 0942)  Absence of Physical Injury: Implement safety measures based on patient assessment

## 2022-10-17 NOTE — PROGRESS NOTES
NEPHROLOGY PROGRESS NOTE    Patient :  Em Parmar; 65 y.o. MRN# [id number]  Location:  2069/2069-01  Attending:  Mali Brock MD  Admit Date:  10/3/2022   Hospital Day: 15    Reason for consultation: Management of acute kidney injury and chronic kidney disease stage IV. Consulting physician: Sofi Aguirre MD.    Presenting Complaint: Change in mental status , combative behavior    Interval history: Patient was seen and examined today and he remains confused and incoherent. He is not in obvious cardiopulmonary distress and is nonoliguric. He does not have blood work today. History of Present Illness: This is a 65 y.o. 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 Ramón Fleming with baseline serum creatinine 1.9-2.2 mg/dL. Patient presented to the hospital  on 10/03/2022 with complains of change in mental status more confusion and combative behavior. On admission serum creatinine was noted to be 2.4 mg/dL which  peaked to 3.5 mg/dL on 10/11/2022.    Patient has not been eating or drinking much poor oral intake and appetite   Patient's blood pressure has been stable   CT head no acute intracranial abnormality   Chest x-ray no confluent airspace consolidation    Current Medications:    aspirin chewable tablet 81 mg, Daily  dextrose 5 % and 0.45 % sodium chloride infusion, Continuous  carvedilol (COREG) tablet 6.25 mg, BID WC  LORazepam (ATIVAN) injection 0.5 mg, Q10 Min PRN  allopurinol (ZYLOPRIM) tablet 300 mg, Daily  melatonin tablet 9 mg, QPM  [Held by provider] spironolactone (ALDACTONE) tablet 25 mg, Daily  tamsulosin (FLOMAX) capsule 0.4 mg, Daily  haloperidol lactate (HALDOL) injection 5 mg, Q8H PRN  QUEtiapine (SEROQUEL) tablet 100 mg, Nightly  galantamine (RAZADYNE) tablet 8 mg, BID WC  sodium chloride flush 0.9 % injection 5-40 mL, 2 times per day  sodium chloride flush 0.9 % injection 5-40 mL, PRN  0.9 % sodium chloride infusion, PRN  [Held by provider] enoxaparin Sodium (LOVENOX) injection 30 mg, Daily  acetaminophen (TYLENOL) tablet 650 mg, Q4H PRN  ondansetron (ZOFRAN-ODT) disintegrating tablet 4 mg, Q8H PRN   Or  ondansetron (ZOFRAN) injection 4 mg, Q6H PRN    Objective:  CURRENT TEMPERATURE:  Temp: 98.1 °F (36.7 °C)  MAXIMUM TEMPERATURE OVER 24HRS:  Temp (24hrs), Av.3 °F (36.8 °C), Min:98.1 °F (36.7 °C), Max:98.4 °F (36.9 °C)    CURRENT RESPIRATORY RATE:  Resp: 17  CURRENT PULSE:  Heart Rate: 77  CURRENT BLOOD PRESSURE:  BP: (!) 153/89  24HR BLOOD PRESSURE RANGE:  Systolic (84QQD), EQP:813 , Min:136 , TPF:420   ; Diastolic (23NYQ), XJO:57, Min:78, Max:99    24HR INTAKE/OUTPUT:    Intake/Output Summary (Last 24 hours) at 10/17/2022 1153  Last data filed at 10/16/2022 1732  Gross per 24 hour   Intake 3440.91 ml   Output --   Net 3440.91 ml       Physical Exam:  GENERAL APPEARANCE: Alert and cooperative, and appears to be in no acute distress. HEAD: Normocephalic and atraumatic. EYES: EOMI. Not pale, anicteric   NOSE:  No nasal discharge. THROAT:  Oral cavity and pharynx normal. Moist  CARDIAC: Normal S1 and S2. No S3, S4 or murmurs. Rhythm is regular. LUNGS: Clear to auscultation and percussion without rales, rhonchi, wheezing or diminished breath sounds. ABDOMEN: Full, soft with normal bowel sounds. EXTREMITIES: No edema. Peripheral pulses intact. NEURO: Confused but with no focal signs. Assessment/plan:    1. Acute kidney injury and chronic kidney disease stage IV - acute kidney injury most consistent with prerenal azotemia with no clear evidence of acute urinary retention at this time. We would need to check his chemistries today to rule out worsening azotemia and possible uremic encephalopathy. Continue IV fluid D5/0.45 normal saline at 75 mL/h. Continue to hold Lovenox and spironolactone. Basic metabolic profile daily.     2.  Systemic hypertension - Continue current antihypertensive medications. Prognosis is guarded.     Electronically signed by Aleksandar Hughes MD on 10/17/2022 at 11:53 AM

## 2022-10-17 NOTE — PROGRESS NOTES
Hospitalist Progress Note  10/17/2022 7:49 PM  Subjective:   Admit Date: 10/3/2022  PCP: Robbie Almendarez MD     DNR-CCA      C/c:  Chief Complaint   Patient presents with    Altered Mental Status         Interval History: pt still awaiting for placement to [facility]    Diet: ADULT DIET; Regular  ADULT ORAL NUTRITION SUPPLEMENT; Breakfast, Lunch, Dinner; Standard High Calorie/High Protein Oral Supplement                                ip days:14  Medications:   Scheduled Meds:   aspirin  81 mg Oral Daily    sodium zirconium cyclosilicate  5 g Oral TID    Calamine   Topical BID    carvedilol  6.25 mg Oral BID WC    allopurinol  300 mg Oral Daily    melatonin  9 mg Oral QPM    [Held by provider] spironolactone  25 mg Oral Daily    tamsulosin  0.4 mg Oral Daily    QUEtiapine  100 mg Oral Nightly    galantamine  8 mg Oral BID WC    sodium chloride flush  5-40 mL IntraVENous 2 times per day    [Held by provider] enoxaparin  30 mg SubCUTAneous Daily     Continuous Infusions:   dextrose 5 % and 0.45 % NaCl 75 mL/hr at 10/17/22 1639    sodium chloride Stopped (10/08/22 0132)     PRN Meds:. LORazepam, haloperidol lactate, sodium chloride flush, sodium chloride, acetaminophen, ondansetron **OR** ondansetron     CBC:   Recent Labs     10/17/22  1251   WBC 17.4*   HGB 12.9*        BMP:    Recent Labs     10/17/22  1251      K 5.4*      CO2 22   BUN 43*   CREATININE 2.29*   GLUCOSE 126*     Hepatic:   Recent Labs     10/17/22  1251   AST 33   ALT 28   BILITOT 0.4   ALKPHOS 30*     Troponin: No results for input(s): TROPONINI in the last 72 hours. BNP: No results for input(s): BNP in the last 72 hours. Lipids: No results for input(s): CHOL, HDL in the last 72 hours. Invalid input(s): LDLCALCU  INR: No results for input(s): INR in the last 72 hours.     Objective:   Vitals: BP (!) 148/79   Pulse 71   Temp 99.9 °F (37.7 °C)   Resp 16   Ht 5' 11\" (1.803 m)   Wt 206 lb (93.4 kg)   SpO2 96%   BMI 28.73 kg/m² General appearance: alert, appears stated age and cooperative  Skin: Skin color, texture, turgor normal. rashes in back  and no lesions  Lungs: clear to auscultation bilaterally  Heart: regular rate and rhythm, S1, S2 normal, no murmur, click, rub or gallop  Abdomen: soft, non-tender; bowel sounds normal; no masses,  no organomegaly  Extremities: extremities normal, atraumatic, no cyanosis or edema  Neurologic: Mental status: Alert, oriented, thought content appropriate    Prophylaxis:   DVT with  [] lovenox        [] heparin        [] Scd        [] none:     Radiology:  CT HEAD WO CONTRAST    Result Date: 10/3/2022  EXAMINATION: CT OF THE HEAD WITHOUT CONTRAST  10/3/2022 11:27 am TECHNIQUE: CT of the head was performed without the administration of intravenous contrast. Automated exposure control, iterative reconstruction, and/or weight based adjustment of the mA/kV was utilized to reduce the radiation dose to as low as reasonably achievable. COMPARISON: 01/21/2021 HISTORY: ORDERING SYSTEM PROVIDED HISTORY: Mental Status Changes TECHNOLOGIST PROVIDED HISTORY: Mental Status Changes Decision Support Exception - unselect if not a suspected or confirmed emergency medical condition->Emergency Medical Condition (MA) Reason for Exam: AMS. FINDINGS: BRAIN/VENTRICLES: There is no acute intracranial hemorrhage, mass effect or midline shift. No abnormal extra-axial fluid collection. The gray-white differentiation is maintained without evidence of an acute infarct. There is no evidence of hydrocephalus. ORBITS: The visualized portion of the orbits demonstrate no acute abnormality. SINUSES: There is mild mucosal thickening in the right frontal, ethmoid, and bilateral sphenoid sinuses. Mild mucosal thickening in the right maxillary sinus. There are bubbly secretions in the posterior right ethmoid sinuses. No air-fluid level. Mastoid air cells are aerated.  SOFT TISSUES/SKULL:  No acute abnormality of the visualized skull or soft tissues. No acute intracranial abnormality. VL Carotid Bilateral    Result Date: 10/5/2022    [address] [place] [address]  Vascular Carotid Procedure   Patient Name Arik Rodriguez Date of Study           10/04/2022               O   Date of      1945  Gender                  Male  Birth   Age          65 year(s)  Race                       Room Number  2069   Corporate ID [id number]  #   Patient Leatha Hashimoto [de-identified]  #   MR #         [id number]      Radha Morgan   Accession #  [id number]  Interpreting Physician  Cesario Ramirez   Referring                Referring Physician     Mari Benjamin  Nurse  Practitioner  Procedure Type of Study:   Cerebral: Carotid, Carotid Scan Bilateral.  Indications for Study:Neck distention and Carotid stenosis. Patient Status: In Patient. Technical Quality:Adequate visualization. Conclusions   Summary   Bilateral:  There is plaque at the level of the carotid bifurcation with a velocity  profile consistent with no significant stenosis . The vertebral arteries  are patent with antegrade flow. Signature   ----------------------------------------------------------------  Electronically signed by Raffy Velasquez(Sonographer) on  10/04/2022 11:36 AM  ----------------------------------------------------------------   ----------------------------------------------------------------  Electronically signed by Cesario Ramirez(Interpreting physician)  on 10/05/2022 06:29 AM  ----------------------------------------------------------------  Findings:   Right Impression:                    Left Impression:  The common carotid artery is normal. The common carotid artery is normal.   The carotid bulb is normal.          The carotid bulb is normal.   The external carotid artery has a    The external carotid artery has a  smooth calcific plaque. smooth calcific plaque.    The internal carotid artery has a    The internal carotid artery has a  smooth calcific plaque causing a     smooth calcific plaque causing a <50%  <50% stenosis based on velocities. stenosis based on velocities. The vertebral artery is patent with  The vertebral artery is patent with  antegrade flow. antegrade flow. Velocities are measured in cm/s ; Diameters are measured in cm Carotid Right Measurements +------------+-------+------+--------+-------+------------+----------------+ ! Location    ! PSV    ! EDV   ! Angle   ! RI     !%Stenosis   ! Tortuosity      ! +------------+-------+------+--------+-------+------------+----------------+ ! Prox CCA    !59.6   ! 10    !        !0.83   !            !                ! +------------+-------+------+--------+-------+------------+----------------+ ! Mid CCA     !52.2   ! 10    !        !0.81   !            !                ! +------------+-------+------+--------+-------+------------+----------------+ ! Dist CCA    !52.2   !11    !        !0.79   !            !                ! +------------+-------+------+--------+-------+------------+----------------+ ! Bulb        !44.7   !11    !        !0.75   !            !                ! +------------+-------+------+--------+-------+------------+----------------+ ! Prox ICA    !37.3   !8     !        !0.79   !            !                ! +------------+-------+------+--------+-------+------------+----------------+ ! Mid ICA     !71     !18    !        !0.74   !            !                ! +------------+-------+------+--------+-------+------------+----------------+ ! Dist ICA    !74.6   !20    !        !0.73   !            !                ! +------------+-------+------+--------+-------+------------+----------------+ ! Prox ECA    !52.8   !5     !        !0.91   !            !                ! +------------+-------+------+--------+-------+------------+----------------+ ! Vertebral   !31.1   !0     !        !1      !            !                ! +------------+-------+------+--------+-------+------------+----------------+   - Additional Measurements:ICAPSV/CCAPSV 1.25. ICAEDV/CCAEDV 2. Carotid Left Measurements +------------+-------+------+--------+-------+------------+----------------+ ! Location    ! PSV    ! EDV   ! Angle   ! RI     !%Stenosis   ! Tortuosity      ! +------------+-------+------+--------+-------+------------+----------------+ ! Prox CCA    !68.3   !13    !        !0.81   !            !                ! +------------+-------+------+--------+-------+------------+----------------+ ! Mid CCA     !82.6   !14    !        !0.83   !            !                ! +------------+-------+------+--------+-------+------------+----------------+ ! Dist CCA    !84.5   !17    !        !0.8    ! !                ! +------------+-------+------+--------+-------+------------+----------------+ ! Bulb        !47.8   ! 10    !        !0.79   !            !                ! +------------+-------+------+--------+-------+------------+----------------+ ! Prox ICA    !75.2   ! 19    !        !0.75   !            !                ! +------------+-------+------+--------+-------+------------+----------------+ ! Mid ICA     !104    !32    !        !0.69   !            !                ! +------------+-------+------+--------+-------+------------+----------------+ ! Dist ICA    ! 97.5   !26    !        !0.73   !            !                ! +------------+-------+------+--------+-------+------------+----------------+ ! Prox ECA    !72.7   !12    !        !0.83   !            !                ! +------------+-------+------+--------+-------+------------+----------------+ ! Vertebral   !34.2   !6     !        !0.82   !            !                ! +------------+-------+------+--------+-------+------------+----------------+   - Additional Measurements:ICAPSV/CCAPSV 1.52. ICAEDV/CCAEDV 2.46. Assessment :   Dementia/better  Poor oral intake     Plan:   1. Continue iv fluids  2.

## 2022-10-17 NOTE — CONSULTS
[address] [address] Continence Nurse  Consult Note       NAME:  Jakub Lockwood RECORD NUMBER:  [id number]  AGE: 65 y.o. GENDER: male  : 1945  TODAY'S DATE:  10/17/2022    Subjective:      Shawna Poe is a 65 y.o. male with inpatient referral to [facility] for:  Buttocks, right posterior thigh, right heel      Wound Identification:  Wound Type: pressure and skin tear  Contributing Factors: chronic pressure, decreased mobility, shear force, decreased tissue oxygenation, incontinence of stool, and incontinence of urine    Wound History: no history available from patient  Current Wound Care Treatment:  foam    Patient Goal of Care:  [x] Wound Healing  [] Odor Control  [] Palliative Care  [] Pain Control   [] Other:         PAST MEDICAL HISTORY        Diagnosis Date    Arthritis     Aspirin long-term use     Back pain     Chronic fatigue 10/7/2020    Chronic kidney disease     CKD (chronic kidney disease), stage III ([facility] 2017    Baseline creatinine 1.4-1.5. Proteinuria reported by primary physician. Workup in progress. Essential hypertension 2017    Fall at home 2019    Family history of prostate problems     Heart disease     Hiatal hernia     HTN (hypertension)     Hyperlipemia     Interstitial nephritis chronic 2020    Kidney bx 20 shows chronic interstitial nephritis with hypertensive changes. Likely cause NSAIDs     Isolated proteinuria 2017    Nonnephrotic, being quantified. Kidney disease, chronic, stage IV (severe, EGFR 15-29 ml/min) ([facility]) 2017    Baseline creatinine 1.4-1.5. Proteinuria reported by primary physician. Likely from long term NSAID use. Workup for immune complex, paraprotein disease negative. Proteinuria fluctuates between 1-2 g.  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    Appears to be primary    MVP (mitral valve prolapse)     Neck pain     Nephrotic syndrome 10/7/2020    NSAID long-term use 2017    Primary osteoarthritis of hand 2017    Urination, excessive at night     Vascular dementia without behavioral disturbance ([address] 2022       PAST SURGICAL HISTORY    Past Surgical History:   Procedure Laterality Date    ANKLE SURGERY      COLONOSCOPY      CT BIOPSY RENAL  2020    CT BIOPSY RENAL 2020 [facility]ED[facility]    INGUINAL HERNIA REPAIR  08    [place]    LUMBAR DISC SURGERY      NECK SURGERY      SPINE SURGERY      TONSILLECTOMY AND ADENOIDECTOMY         FAMILY HISTORY    Family History   Family history unknown: Yes       SOCIAL HISTORY    Social History     Tobacco Use    Smoking status: Former     Packs/day: 0.50     Years: 15.00     Pack years: 7.50     Types: Cigarettes     Start date: 3/17/1960     Quit date: 3/17/1975     Years since quittin.6    Smokeless tobacco: Never   Vaping Use    Vaping Use: Never used   Substance Use Topics    Alcohol use: No    Drug use: No         ALLERGIES    Allergies   Allergen Reactions    Other      \"HAY FEVER\"       HOME MEDICATIONS  Prior to Admission medications    Medication Sig Start Date End Date Taking?  Authorizing Provider   carvedilol (COREG) 6.25 MG tablet Take 1 tablet by mouth 2 times daily 4/25/22 10/3/22  Blanca Rollins MD   spironolactone (ALDACTONE) 25 MG tablet Take 1 tablet by mouth daily 3/2/22 10/3/22  Blnaca Rollins MD   melatonin 10 MG CAPS capsule Take 10 mg by mouth every evening    Historical Provider, MD   galantamine (RAZADYNE ER) 8 MG extended release capsule Take 8 mg by mouth daily (with breakfast)    Historical Provider, MD   tamsulosin (FLOMAX) 0.4 MG capsule TAKE 1 CAPSULE DAILY 3/14/19   Severo Figueredo MD   Polyethyl Glycol-Propyl Glycol (SYSTANE OP) Apply to eye 3 times daily as needed Both eyes TID     Historical Provider, MD   azelastine (OPTIVAR) 0.05 % ophthalmic solution Place 1 drop into both eyes 2 times daily    Historical Provider, MD   aspirin 81 MG EC tablet Take 81 mg by mouth daily    Historical Provider, MD   Multiple Vitamins-Minerals (OCUVITE PO) Take by mouth    Historical Provider, MD   allopurinol (ZYLOPRIM) 300 MG tablet Take 300 mg by mouth daily    Historical Provider, MD       CURRENT MEDICATIONS:  Current Facility-Administered Medications   Medication Dose Route Frequency Provider Last Rate Last Admin    aspirin chewable tablet 81 mg  81 mg Oral Daily Ajay Camacho MD        sodium zirconium cyclosilicate (LOKELMA) oral suspension 5 g  5 g Oral TID Diego Jin MD        dextrose 5 % and 0.45 % sodium chloride infusion   IntraVENous Continuous Ajay Camacho MD 75 mL/hr at 10/17/22 1639 New Bag at 10/17/22 1639    carvedilol (COREG) tablet 6.25 mg  6.25 mg Oral BID  Ajay Camacho MD   6.25 mg at 10/17/22 1656    LORazepam (ATIVAN) injection 0.5 mg  0.5 mg IntraVENous Q10 Min PRN Mari Alexander MD        allopurinol (ZYLOPRIM) tablet 300 mg  300 mg Oral Daily Ajay Camacho MD   300 mg at 10/17/22 0849    melatonin tablet 9 mg  9 mg Oral QPM jAay Camacho MD   9 mg at 10/17/22 1656    [Held by provider] spironolactone (ALDACTONE) tablet 25 mg  25 mg Oral Daily Ajay Camacho MD   25 mg at 10/09/22 0818    tamsulosin (FLOMAX) capsule 0.4 mg  0.4 mg Oral Daily Ajay Camacho MD   0.4 mg at 10/17/22 0849    haloperidol lactate (HALDOL) injection 5 mg  5 mg IntraMUSCular Q8H PRN Ajay Camacho MD   5 mg at 10/05/22 1527    QUEtiapine (SEROQUEL) tablet 100 mg  100 mg Oral Nightly Ajay Camacho MD   100 mg at 10/16/22 1948    galantamine (RAZADYNE) tablet 8 mg  8 mg Oral BID  Basim Sandoval MD   8 mg at 10/17/22 1656    sodium chloride flush 0.9 % injection 5-40 mL  5-40 mL IntraVENous 2 times per day Sukh Vnison MD   10 mL at 10/16/22 1948    sodium chloride flush 0.9 % injection 5-40 mL  5-40 mL IntraVENous PRN Sukh Vinson MD        0.9 % sodium chloride infusion   IntraVENous PRN Sukh Vinson MD   Stopped at 10/08/22 0132    [Held by provider] enoxaparin Sodium (LOVENOX) injection 30 mg  30 mg SubCUTAneous Daily Sukh Vinson MD   30 mg at 10/09/22 0818    acetaminophen (TYLENOL) tablet 650 mg  650 mg Oral Q4H PRN Sukh Vinson MD        ondansetron (ZOFRAN-ODT) disintegrating tablet 4 mg  4 mg Oral Q8H PRN Sukh Vinson MD        Or    ondansetron [facility]) injection 4 mg  4 mg IntraVENous Q6H PRN Sukh Vinson MD             Objective:      BP (!) 150/87   Pulse 80   Temp 98.1 °F (36.7 °C)   Resp 17   Ht 5' 11\" (1.803 m)   Wt 206 lb (93.4 kg)   SpO2 95%   BMI 28.73 kg/m²       LABS    CBC:   Lab Results   Component Value Date/Time    WBC 17.4 10/17/2022 12:51 PM    RBC 4.18 10/17/2022 12:51 PM    HGB 12.9 10/17/2022 12:51 PM     SED RATE: No results found for: SEDRATE    CMP:  Albumin:    Lab Results   Component Value Date/Time    LABALBU 2.6 10/17/2022 12:51 PM     PT/INR:    Lab Results   Component Value Date/Time    PROTIME 12.6 09/22/2020 10:20 AM    INR 1.0 09/22/2020 10:20 AM     HgBA1c:  No results found for: LABA1C  PTT: No components found for: LABPTT      Assessment:       Justus Risk Score: Justus Scale Score: 10    Patient Active Problem List   Diagnosis Code    History of left inguinal hernia Z87.19    Kidney disease, chronic, stage IV (severe, EGFR 15-29 ml/min) (HCC) N18.4    Isolated proteinuria R80.0    NSAID long-term use Z79.1    Essential hypertension I10    Primary osteoarthritis of hand M19.049    Mitral valve prolapse I34.1    Interstitial nephritis chronic N11.9    MPGN (membranoproliferative glomerulonephritis), type 1 N05.5    Nephrotic syndrome N04.9    Weakness R53.1    MILAGRO (acute kidney injury) ([facility] N17.9    Urinary incontinence R32    Gait disturbance R26.9    BPH (benign prostatic hyperplasia) N40.0    Vascular dementia without behavioral disturbance (HCC) F01.50    AMS (altered mental status) R41.82    Acute encephalopathy G93.40    Memory loss R41.3    Cognitive impairment R41.89         Measurements:  Wound 10/16/22 Buttocks Left;Right (Active)   Wound Image   10/17/22 1036   Wound Etiology Skin Tear 10/17/22 1036   Dressing Status Dry;New dressing applied 10/17/22 1036   Wound Cleansed Cleansed with saline 10/17/22 1036   Dressing/Treatment Triad hydro/zinc oxide-based hydrophilic paste 87/27/26 5500   Wound Assessment Pink/red 10/17/22 1036   Drainage Amount None 10/17/22 1036   Drainage Description Serosanguinous 10/17/22 0000   Odor None 10/17/22 1036   Mone-wound Assessment Blanchable erythema;Dry/flaky;Fragile 10/17/22 1036   Margins Attached edges 10/17/22 1036   Number of days: 1       Wound 10/16/22 Thigh Right;Posterior cluster (Active)   Wound Image   10/17/22 1036   Wound Etiology Deep tissue/Injury 10/17/22 1036   Dressing Status Dry;New dressing applied 10/17/22 1036   Wound Cleansed Cleansed with saline 10/17/22 1036   Dressing/Treatment Triad hydro/zinc oxide-based hydrophilic paste; Foam 10/17/22 1036   Wound Length (cm) 4.5 cm 10/17/22 1036   Wound Width (cm) 3.5 cm 10/17/22 1036   Wound Depth (cm) 0.1 cm 10/17/22 1036   Wound Surface Area (cm^2) 15.75 cm^2 10/17/22 1036   Wound Volume (cm^3) 1.575 cm^3 10/17/22 1036   Wound Assessment Pink/red;Purple/maroon 10/17/22 1036   Drainage Amount Scant 10/17/22 1036   Drainage Description Serosanguinous 10/17/22 1036   Odor None 10/17/22 1036   Mone-wound Assessment Dry/flaky 10/17/22 1036   Margins Attached edges 10/17/22 1036   Number of days: 1       Wound 10/16/22 Heel Right (Active)   Wound Image   10/17/22 1036   Wound Etiology Pressure Stage 2 10/17/22 1036   Dressing Status Dry;New dressing applied 10/17/22 1036   Wound Cleansed Cleansed with saline 10/17/22 1036   Dressing/Treatment Foam 10/17/22 1036   Wound Length (cm) 2.2 cm 10/17/22 1036   Wound Width (cm) 2.8 cm 10/17/22 1036   Wound Depth (cm) 0 cm 10/17/22 1036   Wound Surface Area (cm^2) 6.16 cm^2 10/17/22 1036   Wound Volume (cm^3) 0 cm^3 10/17/22 1036   Wound Assessment Fluid filled blister 10/17/22 1036   Drainage Amount None 10/17/22 1036   Odor None 10/17/22 1036   Mone-wound Assessment Blanchable erythema 10/17/22 1036   Margins Attached edges 10/17/22 1036   Number of days: 1       WOUND DESCRIPTION:   [id number]  nurse consult for buttocks, right posterior thigh, and right heel wounds. No history available from patient, as he has dementia. Upon assessment, patient has a few small skin tears to his buttocks. He is incontinent of urine and stool. Recommend Triad cream to protect. Also noted to have two areas of dark purple/maroon discoloration to his right posterior thigh. The more proximal area is red with some dry flaky skin around the edges. Scant amount of drainage. The more distal area is dry, no drainage or odor present. Recommend Triad cream and foam dressing. There is a fluid-filled blister to patient's right heel. No drainage noted. Area is surrounded by blanchable redness. Recommend foam dressing to protect, heel medix boots applied. Response to treatment:  Well tolerated by patient. Plan:     Plan of Care:     Buttocks: Cleanse with foam cleanser, pat dry. Apply Triad cream twice daily and as needed    Right posterior thigh: Cleanse with soap and water, pat dry. Apply Triad cream to wounds, cover with foam dressing. Change daily and as needed if loose or soiled. Right heel: Cleanse with soap and water, pat dry. Apply foam dressing to protect. Change every 3 days and as needed. Heel medix boots at all times while in bed. -Turn every 2 hours    -Float heels off of bed with pillows under calves.  If needed- use offloading boots.    -Sacral foam dressing to sacrococcygeal area. Apply skin barrier wipe to skin first to help adherence. Peel back dressing to inspect skin beneath qshift, re-secure. Change every 72 hours and prn wrinkles, soilage. Discontinue if repeatedly soiled by incontinence.     -Routine incontinence care with foaming cleanser and zinc oxide cream. Apply zinc oxide cream twice daily and prn incontinence. Use moisture wicking under pad (one layer only). -Waffle mattress overlay. Check inflation each shift by sliding hand under the air overlay. Feel for the patient's heaviest/ most dependant body part. Ideally 1/2 to 1\" of air will be between your hand and the patient's body. Add air prn. Specialty Bed Required : Yes   [] Low Air Loss   [x] Pressure Redistribution  [] Fluid Immersion  [] Bariatric  [] Total Pressure Relief  [] Other:     Current Diet: ADULT DIET;  Regular  ADULT ORAL NUTRITION SUPPLEMENT; Breakfast, Lunch, Dinner; Standard High Calorie/High Protein Oral Supplement  Dietician consult:  Yes    Discharge Plan:  Placement for patient upon discharge: skilled nursing   Patient appropriate for Outpatient [address] [facility] [address]: Yes    Patient/Caregiver Teaching:  Level of patientunderstanding able to:     [] Indicates understanding       [x] Needs reinforcement  [] Unsuccessful      [] Verbal Understanding  [] Demonstrated understanding       [] No evidence of learning  [] Refused teaching         [] N/A       Electronically signed by Arielle Stephenson RN on  10/17/2022 at 5:03 PM

## 2022-10-17 NOTE — PROGRESS NOTES
[address]   OCCUPATIONAL THERAPY MISSED TREATMENT NOTE   INPATIENT   Date: 10/17/22  Patient Name: Mik Cabrera       Room:   MRN: [id number]   Account #: [de-identified]    : 1945  (65 y.o.)  Gender: male   Referring Practitioner: Bienvenido Redman MD  Diagnosis: AMS             REASON FOR MISSED TREATMENT:  Patient unable to participate   -    pt confused at this time and unable to follow directions. Will continue to follow for OT needs.           Maycol Redmond JESUS

## 2022-10-17 NOTE — CARE COORDINATION
MARK spoke with insurance about patients status of expedited appeal. Tirso Garnett reported that they did not receive any documents for the expedited appeal process. The denial letter was not sent out until 10- to facility, therefore, NO expedited appeal was started Friday. SW will start the process for an expedited appeal.    SW was provided a fax number to send updated clinical information to [phone]. Electronically signed by JAMILAH Mon on 10/17/2022 at 12:43 PM     Expedited appeal was faxed at 12:49PM.     MARK spoke with Kacey Gao about discharge plans. MARK explained that patient has been denied for regular authorization, peer to peer, and we are now in the process of filing an expedited appeal. MARK explained to family that we can attempt to get patient pending Medicaid while here in the hospital. SW explained that it is a lengthy process, and certain criteria would have to be met to qualify. MARK then explained that we would have to find a facility that can accept patient under pending Medicaid. MARK is still awaiting decision from expedited appeal that can take up to 72 hours. MARK left  to speak with Tiff Champion from the HELP program. Electronically signed by JAMILAH Mon on 10/17/2022 at 2:06 PM     Spoke with Long Russo from [facility] [place] program. Reported that she will get in touch with spouse and MPOA.  Electronically signed by JAMILAH Mon on 10/17/2022 at 2:22 PM

## 2022-10-17 NOTE — PROGRESS NOTES
Comprehensive Nutrition Assessment    Type and Reason for Visit:  Reassess    Nutrition Recommendations/Plan:   Will continue to provide Regular diet with Ensure Enlive (strawberry) all trays  Has nutrition support been discussed with this family/pt? Malnutrition Assessment:  Malnutrition Status: At risk for malnutrition (Comment) (10/10/22 122)    Context:  Acute Illness     Findings of the 6 clinical characteristics of malnutrition:  Energy Intake:  50% or less of estimated energy requirements for 5 or more days  Weight Loss:  No significant weight loss     Body Fat Loss:  No significant body fat loss     Muscle Mass Loss:  No significant muscle mass loss    Fluid Accumulation:  No significant fluid accumulation     Strength:  Not Performed    Nutrition Assessment:    Spoke to pt's wife who states pt likes Strawcarly Pena. Nursing documentation suggests po intake has been consistently less than 25%. Nutrition Related Findings:    no edema, Labs: K 5.4, Meds: Reviewed Wound Type: Pressure Injury, Stage I, Multiple, Skin Tears       Current Nutrition Intake & Therapies:    Average Meal Intake: 1-25%, 0%  Average Supplements Intake: 51-75%  ADULT DIET; Regular  ADULT ORAL NUTRITION SUPPLEMENT; Breakfast, Lunch, Dinner; Standard High Calorie/High Protein Oral Supplement    Anthropometric Measures:  Height: 5' 11\" (180.3 cm)  Ideal Body Weight (IBW): 172 lbs (78 kg)    Admission Body Weight: 206 lb (93.4 kg)  Current Body Weight: 206 lb (93.4 kg),   IBW. Weight Source: Stated  Current BMI (kg/m2): 28.7  Usual Body Weight: 207 lb (93.9 kg)  % Weight Change (Calculated): -0.5                    BMI Categories: Overweight (BMI 25.0-29. 9)    Estimated Daily Nutrient Needs:  Energy Requirements Based On: Formula  Weight Used for Energy Requirements: Admission  Energy (kcal/day): [place] x 1.1= 2152-6125 kcal  Weight Used for Protein Requirements: Admission  Protein (g/day): 1.3g/kg= 120 g Nutrition Diagnosis:   Inadequate oral intake related to  (current medical condition) as evidenced by intake 0-25%    Nutrition Interventions:   Food and/or Nutrient Delivery: Continue Oral Nutrition Supplement, Continue Current Diet  Nutrition Education/Counseling: No recommendation at this time  Coordination of Nutrition Care: Continue to monitor while inpatient       Goals:  Previous Goal Met: No Progress toward Goal(s)  Goals: PO intake 50% or greater       Nutrition Monitoring and Evaluation:      Food/Nutrient Intake Outcomes: Food and Nutrient Intake, Supplement Intake  Physical Signs/Symptoms Outcomes: Biochemical Data, Chewing or Swallowing, GI Status, Fluid Status or Edema, Skin, Weight    Discharge Planning:    Continue current diet, Continue Oral Nutrition Supplement     Suzi Sanderson, [address] [place] [address],   Contact: [phone]

## 2022-10-17 NOTE — PROGRESS NOTES
Pt's wife Bridgette Young along with 2 friends from [employer] were at bedside. Bridgette Young expressed frustration and anxiety over pt's situation. Writer provided listening presence and emotional support. 10/17/22 1617   Encounter Summary   Encounter Overview/Reason  Spiritual/Emotional Needs   Service Provided For: Patient and family together   Referral/Consult From: Palliative Care   Support System Spouse; [employer]   Last Encounter  10/17/22   Complexity of Encounter Low   Spiritual/Emotional needs   Type Spiritual Support   Palliative Care   Type Palliative Care, Follow-up   Assessment/Intervention/Outcome   Assessment Calm   Intervention Active listening;Discussed illness injury and its impact; Explored/Affirmed feelings, thoughts, concerns;Prayer (assurance of)/[place];Sustaining Presence/Ministry of presence   Outcome Comfort;Engaged in conversation;Expressed feelings, needs, and concerns;Expressed Gratitude;Receptive

## 2022-10-18 LAB
ANION GAP SERPL CALCULATED.3IONS-SCNC: 11 MMOL/L (ref 9–17)
BUN BLDV-MCNC: 41 MG/DL (ref 8–23)
CALCIUM SERPL-MCNC: 8.8 MG/DL (ref 8.6–10.4)
CHLORIDE BLD-SCNC: 105 MMOL/L (ref 98–107)
CO2: 20 MMOL/L (ref 20–31)
CREAT SERPL-MCNC: 2.06 MG/DL (ref 0.7–1.2)
GFR SERPL CREATININE-BSD FRML MDRD: 32 ML/MIN/1.73M2
GLUCOSE BLD-MCNC: 102 MG/DL (ref 70–99)
POTASSIUM SERPL-SCNC: 4.5 MMOL/L (ref 3.7–5.3)
SODIUM BLD-SCNC: 136 MMOL/L (ref 135–144)

## 2022-10-18 PROCEDURE — 97530 THERAPEUTIC ACTIVITIES: CPT

## 2022-10-18 PROCEDURE — 36415 COLL VENOUS BLD VENIPUNCTURE: CPT

## 2022-10-18 PROCEDURE — 97535 SELF CARE MNGMENT TRAINING: CPT

## 2022-10-18 PROCEDURE — 6370000000 HC RX 637 (ALT 250 FOR IP): Performed by: PSYCHIATRY & NEUROLOGY

## 2022-10-18 PROCEDURE — 97110 THERAPEUTIC EXERCISES: CPT

## 2022-10-18 PROCEDURE — 2580000003 HC RX 258: Performed by: FAMILY MEDICINE

## 2022-10-18 PROCEDURE — 1200000000 HC SEMI PRIVATE

## 2022-10-18 PROCEDURE — 80048 BASIC METABOLIC PNL TOTAL CA: CPT

## 2022-10-18 PROCEDURE — 6370000000 HC RX 637 (ALT 250 FOR IP): Performed by: INTERNAL MEDICINE

## 2022-10-18 PROCEDURE — 6370000000 HC RX 637 (ALT 250 FOR IP): Performed by: FAMILY MEDICINE

## 2022-10-18 RX ORDER — CARVEDILOL 12.5 MG/1
12.5 TABLET ORAL 2 TIMES DAILY WITH MEALS
Status: DISCONTINUED | OUTPATIENT
Start: 2022-10-18 | End: 2022-10-20

## 2022-10-18 RX ADMIN — SODIUM CHLORIDE, PRESERVATIVE FREE 10 ML: 5 INJECTION INTRAVENOUS at 22:01

## 2022-10-18 RX ADMIN — FERRIC OXIDE RED: 8; 8 LOTION TOPICAL at 22:26

## 2022-10-18 RX ADMIN — FERRIC OXIDE RED: 8; 8 LOTION TOPICAL at 08:23

## 2022-10-18 NOTE — PROGRESS NOTES
10/18/22 0900   Encounter Summary   Encounter Overview/Reason  Spiritual/Emotional Needs   Service Provided For: Patient   Referral/Consult From: Palliative Care   Last Encounter  10/18/22   Complexity of Encounter Low   Spiritual/Emotional needs   Type Spiritual Support   Palliative Care   Type Palliative Care, Follow-up   Assessment/Intervention/Outcome   Assessment Unable to assess  (Sleeping)   Intervention Prayer (assurance of)/[place]

## 2022-10-18 NOTE — PLAN OF CARE
Problem: Discharge Planning  Goal: Discharge to home or other facility with appropriate resources  10/18/2022 1040 by Kahlil Gómez RN  Outcome: Progressing     Problem: Safety - Adult  Goal: Free from fall injury  10/18/2022 1040 by Kahlil Gómez RN  Outcome: Progressing     Problem: Discharge Planning  Goal: Discharge to home or other facility with appropriate resources  10/18/2022 1040 by Kahlil Gómez RN  Outcome: Progressing  10/18/2022 1040 by Kahlil Gómez RN  Outcome: Not Progressing     Problem: Safety - Adult  Goal: Free from fall injury  10/18/2022 1040 by Kahlil Gómez RN  Outcome: Progressing  10/18/2022 1040 by Kahlil Gómez RN  Outcome: Not Progressing  Flowsheets (Taken 10/18/2022 1038)  Free From Fall Injury: Instruct family/caregiver on patient safety     Problem: Pain  Goal: Verbalizes/displays adequate comfort level or baseline comfort level  10/18/2022 1040 by Kahlil Gómez RN  Outcome: Progressing  10/18/2022 1040 by Kahlil Gómez RN  Outcome: Not Progressing     Problem: ABCDS Injury Assessment  Goal: Absence of physical injury  10/18/2022 1040 by Kahlil Gómez RN  Outcome: Progressing  10/18/2022 1040 by Kahlil Gómez RN  Outcome: Not Progressing  Flowsheets (Taken 10/18/2022 1038)  Absence of Physical Injury: Implement safety measures based on patient assessment     Problem: Neurosensory - Adult  Goal: Achieves maximal functionality and self care  10/18/2022 1040 by Kahlil Gómez RN  Outcome: Progressing  10/18/2022 1040 by Kahlil Gómez RN  Outcome: Not Progressing     Problem: Skin/Tissue Integrity - Adult  Goal: Skin integrity remains intact  10/18/2022 1040 by Kahlil Gómez RN  Outcome: Progressing  10/18/2022 1040 by Kahlil Gómez RN  Outcome: Not Progressing  Flowsheets (Taken 10/18/2022 1038)  Skin Integrity Remains Intact: Monitor for areas of redness and/or skin breakdown     Problem: Skin/Tissue Integrity  Goal: Absence of new skin breakdown  Description: 1.   Monitor for areas of redness and/or skin breakdown  2. Assess vascular access sites hourly  3. Every 4-6 hours minimum:  Change oxygen saturation probe site  4. Every 4-6 hours:  If on nasal continuous positive airway pressure, respiratory therapy assess nares and determine need for appliance change or resting period.   10/18/2022 1040 by Mavis Adam RN  Outcome: Progressing  10/18/2022 1040 by Mavis Adam RN  Outcome: Not Progressing     Problem: Nutrition Deficit:  Goal: Optimize nutritional status  10/18/2022 1040 by Mavis Adam RN  Outcome: Progressing  10/18/2022 1040 by Mavis Adam RN  Outcome: Not Progressing

## 2022-10-18 NOTE — CARE COORDINATION
Received VM from Nanda Adams with the HELP program. GAVIN reports that patient will most likely qualify for pending Medicaid IF he goes to skilled nursing facility . Patient does not meet criteria for Medicaid if he discharges to community. Family HAS NOT completed Medicaid application, but Nanda Adams from [facility] program reports they will call her today to discuss a time to complete application. Electronically signed by JAMILAH Iyer on 10/18/2022 at 1:27 PM     SW has been given three different numbers and has attempted to reach the expedited appeal department multiple times. MARK does not get an answer from anyone at [employer]. MARK spoke with Nanda Adams from [facility] and the pending Medicaid application has not been completed. Electronically signed by JAMILAH Iyer on 10/18/2022 at 1:27 PM     Spoke with [address] [place] [address] about discharge plans. Spouse was present during the conversation. [address] [place] [address] will review patient once again, and have plans to complete onsite with patient today as well. It will be difficult to get patient placed until he has a pending medicaid number and/or application.  Electronically signed by JAMILAH Iyer on 10/18/2022 at 1:29 PM

## 2022-10-18 NOTE — PROGRESS NOTES
NEPHROLOGY PROGRESS NOTE    Patient :  Lydia Walden; 65 y.o. MRN# [id number]  Location:  2069/2069-01  Attending:  Elizabeth Giordano MD  Admit Date:  10/3/2022   Hospital Day: 13    Reason for consultation: Management of acute kidney injury and chronic kidney disease stage IV. Consulting physician: Valarie Walker MD.    Presenting Complaint: Change in mental status and combative behavior. Interval history: Patient remains confused but is not combative at this time. He is not in respiratory distress. Urine output documentation is poor    History of Present Illness: This is a 65 y.o. 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 Jay Price with baseline serum creatinine 1.9-2.2 mg/dL. Patient presented to the hospital  on 10/03/2022 with complains of change in mental status more confusion and combative behavior. On admission serum creatinine was noted to be 2.4 mg/dL which  peaked to 3.5 mg/dL on 10/11/2022. He apparently has not been eating or drinking much poor oral intake and appetite but blood pressure has been stable. CT scan of the brain showed no acute intracranial abnormality and chest x-ray Not show pulmonary edema or infiltrates.     Current Medications:    aspirin chewable tablet 81 mg, Daily  sodium zirconium cyclosilicate (LOKELMA) oral suspension 5 g, TID  Calamine 8-8 % lotion, BID  dextrose 5 % and 0.45 % sodium chloride infusion, Continuous  carvedilol (COREG) tablet 6.25 mg, BID WC  LORazepam (ATIVAN) injection 0.5 mg, Q10 Min PRN  allopurinol (ZYLOPRIM) tablet 300 mg, Daily  melatonin tablet 9 mg, QPM  [Held by provider] spironolactone (ALDACTONE) tablet 25 mg, Daily  tamsulosin (FLOMAX) capsule 0.4 mg, Daily  haloperidol lactate (HALDOL) injection 5 mg, Q8H PRN  QUEtiapine (SEROQUEL) tablet 100 mg, Nightly  galantamine (RAZADYNE) tablet 8 mg, BID WC  sodium chloride flush 0.9 % injection 5-40 mL, 2 times per day  sodium chloride flush 0.9 % injection 5-40 mL, PRN  0.9 % sodium chloride infusion, PRN  [Held by provider] enoxaparin Sodium (LOVENOX) injection 30 mg, Daily  acetaminophen (TYLENOL) tablet 650 mg, Q4H PRN  ondansetron (ZOFRAN-ODT) disintegrating tablet 4 mg, Q8H PRN   Or  ondansetron (ZOFRAN) injection 4 mg, Q6H PRN    Objective:  CURRENT TEMPERATURE:  Temp: 99.3 °F (37.4 °C)  MAXIMUM TEMPERATURE OVER 24HRS:  Temp (24hrs), Av.6 °F (37.6 °C), Min:99.3 °F (37.4 °C), Max:99.9 °F (37.7 °C)    CURRENT RESPIRATORY RATE:  Resp: 18  CURRENT PULSE:  Heart Rate: 80  CURRENT BLOOD PRESSURE:  BP: (!) 162/90  24HR BLOOD PRESSURE RANGE:  Systolic (80TVC), GRC:649 , Min:148 , UPN:881   ; Diastolic (50JZJ), OUB:65, Min:79, Max:90    24HR INTAKE/OUTPUT:    Intake/Output Summary (Last 24 hours) at 10/18/2022 1546  Last data filed at 10/18/2022 1127  Gross per 24 hour   Intake 0 ml   Output --   Net 0 ml       Physical Exam:  GENERAL APPEARANCE: Awake and alert but not oriented. HEAD: Normocephalic and atraumatic. EYES: EOMI. Not pale, anicteric   NOSE:  No nasal discharge. THROAT:  Oral cavity and pharynx normal. Moist  CARDIAC: Normal S1 and S2. No S3, S4 or murmurs. Rhythm is regular. LUNGS: Clear to auscultation and percussion without rales, rhonchi, wheezing or diminished breath sounds. ABDOMEN: Full, soft with normal bowel sounds. EXTREMITIES: No edema. Peripheral pulses intact. NEURO: Confused but with no focal signs. Assessment/plan:    1. Acute kidney injury and chronic kidney disease stage IV - acute kidney injury most consistent with prerenal azotemia with no clear evidence of acute urinary retention at this time. Renal function is improving with hydration. Plan: Change IV fluid to D5/0.9 normal saline at 50 mL/h. Continue to hold Lovenox and spironolactone. Basic metabolic profile daily.     2.  Systemic hypertension - Blood pressure control is slightly suboptimal. We will increase carvedilol to 12.5 mg p.o. twice daily. Prognosis is guarded.     Electronically signed by Iram Macias MD on 10/18/2022 at 3:46 PM

## 2022-10-18 NOTE — PROGRESS NOTES
10/18/22 6092   Encounter Summary   Encounter Overview/Reason  Spiritual/Emotional Needs   Service Provided For: Patient   Referral/Consult From: Palliative Care   Last Encounter  10/18/22   Complexity of Encounter Low   Spiritual/Emotional needs   Type Spiritual Support   Palliative Care   Type Palliative Care, Follow-up   Assessment/Intervention/Outcome   Assessment Unable to assess  (patient sleeping)   Intervention Prayer (assurance of)/Bastian

## 2022-10-18 NOTE — PROGRESS NOTES
Physical Therapy  Facility/Department: [facility]  Daily Treatment Note  NAME: Cathy Jack  : 1945  MRN: [id number]    Date of Service: 10/18/2022    Discharge Recommendations:  Patient would benefit from continued therapy after discharge        Patient Diagnosis(es): The encounter diagnosis was Altered mental status, unspecified altered mental status type. Assessment   Activity Tolerance: Patient limited by fatigue;Patient limited by endurance; Patient tolerated treatment well     Plan    Physcial Therapy Plan  General Plan: 3-5 times per week  Current Treatment Recommendations: Strengthening;Balance training;Functional mobility training;Transfer training;Gait training; Endurance training;Cognitive/Perceptual training; Safety education & training;Positioning;Cognitive reorientation;Patient/Caregiver education & training;Equipment evaluation, education, & procurement; Therapeutic activities     Restrictions  Restrictions/Precautions  Restrictions/Precautions: Fall Risk  Required Braces or Orthoses?: No  Implants present? :  (h/o cervical and lumbar sx, ankle sx)  Position Activity Restriction  Other position/activity restrictions: up with assist, 1:1 sitter, bed alarm, personal alarm when in chair     Subjective    Subjective  Subjective: Pt is asleep in bed upon arrival. Pt is difficult to arouse but does wake up smiling. Co-treat with OTIVONE Caro.   Pain: Denies Pain  Orientation  Overall Orientation Status: Impaired  Orientation Level: Disoriented X4  Cognition  Overall Cognitive Status: Exceptions  Arousal/Alertness: Inconsistent responses to stimuli;Delayed responses to stimuli  Following Commands: Inconsistently follows commands  Attention Span: Difficulty attending to directions  Memory: Decreased recall of biographical Information;Decreased recall of precautions;Decreased recall of recent events;Decreased short term memory;Decreased long term memory  Safety Judgement: Decreased awareness of need for safety  Problem Solving: Assistance required to identify errors made  Insights: Not aware of deficits  Initiation: Requires cues for all  Sequencing: Requires cues for all  Cognition Comment: pt pleasantly confused, cooperative with increased time, fatigued     Objective   Vitals     Bed Mobility Training  Bed Mobility Training: Yes  Interventions: Safety awareness training;Verbal cues; Tactile cues  Rolling: Total assistance;Assist X2  Supine to Sit: Total assistance;Assist X2  Sit to Supine: Maximum assistance;Assist X2;Additional time; Adaptive equipment; Total assistance  Scooting: Maximum assistance; Additional time;Assist X2;Adaptive equipment; Total assistance  Balance  Sitting: Impaired  Sitting - Static: Poor (constant support)  Sitting - Dynamic: Poor (constant support)  Standing:  (Not tested)  Transfer Training  Transfer Training: Yes  Interventions: Safety awareness training  Sit to Stand: Maximum assistance;Assist X2;Additional time; Adaptive equipment Haney Schilder)  Stand to Sit: Maximum assistance;Assist X2;Additional time; Adaptive equipment Haney Schilder)  Gait Training  Gait Training: No     PT Exercises  Static Sitting Balance Exercises: Dangle EOB 15min. Postural Correction Exercises: Sitting up EOB heavy heavy right anterior lateral lean, bilat UE support however pt still requires postural correction and manual assist to sit up and look out the window.            [facility] Mobility Inpatient   How much difficulty turning over in bed?: Unable  How much difficulty sitting down on / standing up from a chair with arms?: Unable  How much difficulty moving from lying on back to sitting on side of bed?: Unable  How much help from another person moving to and from a bed to a chair?: Total  How much help from another person needed to walk in hospital room?: Total  How much help from another person for climbing 3-5 steps with a railing?: Total  AM-PAC Inpatient Mobility Raw Score : 6  AM-PAC Inpatient T-Scale Score : 23.55  Mobility Inpatient CMS 0-100% Score: 100  Mobility Inpatient CMS G-Code Modifier : CN      Goals  Short Term Goals  Time Frame for Short Term Goals: 7 days  Short Term Goal 1: Pt will increase Bilat LE strength to 4-/5 to maximize mobiltiy  Short Term Goal 2: Pt will perform bed mobiltiy Mod A  Short Term Goal 3: Pt will perform transfers with RW Mod A  Short Term Goal 4: Pt will ambulate with RW 50 ft Mod A  Additional Goals?: No  Patient Goals   Patient Goals : unable to state    Education  Patient Education  Education Given To: Patient; Family;Caregiver  Education Provided: Precautions;Transfer Training; Fall Prevention Strategies  Education Method: Demonstration;Verbal  Barriers to Learning: Cognition  Education Outcome: Continued education needed    Therapy Time   Individual Concurrent Group Co-treatment   Time In 3604         Time Out 0957         Minutes [address]

## 2022-10-18 NOTE — CARE COORDINATION
Received VM from Darla Paget with the HELP program. GAVIN reports that patient will most likely qualify for pending Medicaid IF he goes to skilled nursing facility . Patient does not meet criteria for Medicaid if he discharges to community. Family HAS NOT completed Medicaid application, but Darla Paget from [facility] [employer] program reports they will call her today to discuss a time to complete application. Electronically signed by JAMILAH Feldman on 10/18/2022 at 12:26 PM     SW has been given three different numbers and has attempted to reach the expedited appeal department multiple times. MARK does not get an answer from anyone at [employer]. MARK spoke with Darla Paget from [facility] and the pending Medicaid application has not been completed. Electronically signed by JAMILAH Feldman on 10/18/2022 at 1:21 PM     [employer] confirmed that expedited appeal documents were received. MARK will receive a response to [employer] fax in 72 hours.  Electronically signed by JAMILAH Feldman on 10/18/2022 at 4:44 PM

## 2022-10-18 NOTE — PROGRESS NOTES
6 [facility]   INPATIENT OCCUPATIONAL THERAPY  PROGRESS NOTE  Date: 10/18/2022  Patient Name: Shyam Montero       Room:   MRN: [id number]    : 1945  (65 y.o.)  Gender: male   Referring Practitioner: Sandra Das MD  Diagnosis: AMS    Restrictions/Precautions  Restrictions/Precautions  Restrictions/Precautions: Fall Risk  Required Braces or Orthoses?: No  Implants present? :  (H/o cervical and lumbar sx, ankle sx)  Position Activity Restriction  Other position/activity restrictions: up with assist, 1:1 sitter, bed alarm, personal alarm when in chair    Vitals  O2 Device: None (Room air)    Subjective  Subjective  Subjective: Pt is asleep in bed upon arrival. Pt is difficult to arouse but does wake up smiling. Co-treat with OTR Vania.   Pain: Denies Pain  Comments: Okay for OT/PT per RN Illona Milling    Objective  Orientation  Overall Orientation Status: Impaired  Orientation Level: Disoriented X4  Cognition  Overall Cognitive Status: Exceptions  Arousal/Alertness: Inconsistent responses to stimuli;Delayed responses to stimuli  Following Commands: Inconsistently follows commands  Attention Span: Difficulty attending to directions  Memory: Decreased recall of biographical Information;Decreased recall of precautions;Decreased recall of recent events;Decreased short term memory;Decreased long term memory  Safety Judgement: Decreased awareness of need for safety  Problem Solving: Assistance required to identify errors made  Insights: Not aware of deficits  Initiation: Requires cues for all  Sequencing: Requires cues for all  Cognition Comment: pt pleasantly confused, cooperative with increased time, fatigued  ADL  Feeding: Dependent/Total  Feeding Skilled Clinical Factors: Assist from writer while sitting EOB to drink Ensure drink  Grooming: Dependent/Total  Grooming Skilled Clinical Factors: Writer provides TA to cleanse face with washcloth  UE Bathing: Dependent/Total  LE Bathing: Dependent/Total  UE Dressing: Unable to assess(comment)  LE Dressing: Dependent/Total  Toileting: Dependent/Total  Additional Comments: ADL scores are based on skilled observation and clinical reasoning unless otherwise noted. Pt currently remains limited d/t cognitive deficits, strength, balance, safety, endurance, and activity tolerance which impacts the pt's ability to safely and independently complete self-care and mobility. Balance  Balance  Sitting Balance: Maximum assistance (Heavy R lateral and anterior lean)  Standing Balance: Unable to assess(comment)  Standing Balance  Comment:  (SAVANNA)    Transfers/Mobility  Bed mobility  Rolling to Left: 2 Person assistance;Dependent/Total  Rolling to Right: 2 Person assistance;Dependent/Total  Supine to Sit: 2 Person assistance;Dependent/Total  Sit to Supine: 2 Person assistance;Dependent/Total  Scootin Person assistance;Dependent/Total  Bed Mobility Comments: TA x2 for all bed mobility this date  Transfers  Transfer Comments: SAVANNA    Functional Mobility  Functional Mobility Comments: SAVANNA    Functional Activities  OT Exercises  Static Sitting Balance Exercises: OT/PTA facilitated pt's engagement in static sitting at EOB to facilitate improved ability to complete self-care routine. Pt tolerated well, able to sit on EOB for ~15 minutes. Pt req max verbal cues and Max A x1 to maintain sitting balance, as pt demos heavy R lateral and anterior lean this date. Pt able to help support self with RUE placed on bed rail. Pt encouraged to complete parts of self-care routine while seated EOB, however req TA to wash face with washcloth this date. Pt declines to brush teeth.       Patient Education  Patient Education  Education Given To: Patient  Education Provided: Role of Therapy, Plan of Care, Transfer Training  Education Method: Verbal, Demonstration  Barriers to Learning: Cognition  Education Outcome: Continued education needed      Goals  Short Term Goals  Time Frame for Short Term Goals: By discharge  Short Term Goal 1: Pt will complete upper body dressing/bathing with min A and Good attention to task  Short Term Goal 2: Pt will complete simple grooming task/self feeding with SBA and min verbal cues  Short Term Goal 3: Pt will complete lower body dressing/bathing with Max A and Good attention to task  Short Term Goal 4: Pt will complete functional transfers/mobility with mod A and Good safety with use of least restrictive device  Short Term Goal 5: Pt will follow simple 1-2 step commands 75% of the time to increase participation in daily activities  Additional Goals?: Yes  Short Term Goal 6: Pt will participate in 15+ minutes of therapeutic exercies/functional activities to increase safety and independence with self care and mobility  Occupational Therapy Plan  Times Per Week: 4-6  Times Per Day:  Once a day  Current Treatment Recommendations: Self-Care / ADL, Strengthening, ROM, Balance training, Functional mobility training, Endurance training, Cognitive reorientation, Pain management, Safety education & training, Patient/Caregiver education & training, Equipment evaluation, education, & procurement, Cognitive/Perceptual training, Coordination training      Assessment  Activity Tolerance  Activity Tolerance: Treatment limited secondary to decreased cognition, Patient limited by fatigue  Assessment  Performance deficits / Impairments: Decreased ADL status, Decreased functional mobility , Decreased strength, Decreased safe awareness, Decreased cognition, Decreased endurance, Decreased balance, Decreased high-level IADLs, Decreased fine motor control, Decreased coordination, Decreased posture  Assessment: pillows and wedge used to support pt while sitting in recliner, NSG made aware  Treatment Diagnosis: Impaired self care status  Prognosis: Fair  Decision Making: Medium Complexity  Discharge Recommendations: Patient would benefit from continued therapy after discharge  OT Equipment Recommendations  Other: TBD  Safety Devices  Type of Devices:  All bernardino prominences offloaded, All fall risk precautions in place, Bed alarm in place, Call light within reach, Patient at risk for falls, Left in bed, Nurse notified      AM-PAC Daily Activities Inpatient  AM-PAC Daily Activity Inpatient   How much help for putting on and taking off regular lower body clothing?: Total  How much help for Bathing?: Total  How much help for Toileting?: Total  How much help for putting on and taking off regular upper body clothing?: Total  How much help for taking care of personal grooming?: Total  How much help for eating meals?: Total  AM-PAC Inpatient Daily Activity Raw Score: 6  AM-PAC Inpatient ADL T-Scale Score : 17.07  ADL Inpatient CMS 0-100% Score: 100  ADL Inpatient CMS G-Code Modifier : CN    OT Minutes  OT Individual Minutes  Time In: 0929  Time Out: 0957  Minutes: 28  Time Code Minutes   Timed Code Treatment Minutes: 28 Minutes      Electronically signed by NORMAN France on 10/18/2022 at 1:24 PM

## 2022-10-19 LAB
ANION GAP SERPL CALCULATED.3IONS-SCNC: 11 MMOL/L (ref 9–17)
BUN BLDV-MCNC: 41 MG/DL (ref 8–23)
CALCIUM SERPL-MCNC: 8.6 MG/DL (ref 8.6–10.4)
CHLORIDE BLD-SCNC: 103 MMOL/L (ref 98–107)
CO2: 19 MMOL/L (ref 20–31)
CREAT SERPL-MCNC: 2.34 MG/DL (ref 0.7–1.2)
GFR SERPL CREATININE-BSD FRML MDRD: 28 ML/MIN/1.73M2
GLUCOSE BLD-MCNC: 122 MG/DL (ref 70–99)
POTASSIUM SERPL-SCNC: 4.3 MMOL/L (ref 3.7–5.3)
SERUM OSMOLALITY: 296 MOSM/KG (ref 275–295)
SODIUM BLD-SCNC: 133 MMOL/L (ref 135–144)

## 2022-10-19 PROCEDURE — 1200000000 HC SEMI PRIVATE

## 2022-10-19 PROCEDURE — 80048 BASIC METABOLIC PNL TOTAL CA: CPT

## 2022-10-19 PROCEDURE — 6370000000 HC RX 637 (ALT 250 FOR IP): Performed by: FAMILY MEDICINE

## 2022-10-19 PROCEDURE — 83930 ASSAY OF BLOOD OSMOLALITY: CPT

## 2022-10-19 PROCEDURE — 2500000003 HC RX 250 WO HCPCS: Performed by: INTERNAL MEDICINE

## 2022-10-19 PROCEDURE — 51798 US URINE CAPACITY MEASURE: CPT

## 2022-10-19 PROCEDURE — 2580000003 HC RX 258: Performed by: INTERNAL MEDICINE

## 2022-10-19 PROCEDURE — 6370000000 HC RX 637 (ALT 250 FOR IP): Performed by: INTERNAL MEDICINE

## 2022-10-19 PROCEDURE — 6370000000 HC RX 637 (ALT 250 FOR IP): Performed by: PSYCHIATRY & NEUROLOGY

## 2022-10-19 PROCEDURE — 36415 COLL VENOUS BLD VENIPUNCTURE: CPT

## 2022-10-19 PROCEDURE — 97110 THERAPEUTIC EXERCISES: CPT

## 2022-10-19 PROCEDURE — 97535 SELF CARE MNGMENT TRAINING: CPT

## 2022-10-19 RX ORDER — DEXTROSE AND SODIUM CHLORIDE 5; .9 G/100ML; G/100ML
INJECTION, SOLUTION INTRAVENOUS CONTINUOUS
Status: DISCONTINUED | OUTPATIENT
Start: 2022-10-19 | End: 2022-10-19

## 2022-10-19 RX ADMIN — GALANTAMINE 8 MG: 4 TABLET, FILM COATED ORAL at 10:05

## 2022-10-19 RX ADMIN — ASPIRIN 81 MG: 81 TABLET, CHEWABLE ORAL at 10:01

## 2022-10-19 RX ADMIN — FERRIC OXIDE RED: 8; 8 LOTION TOPICAL at 10:05

## 2022-10-19 RX ADMIN — SODIUM BICARBONATE: 84 INJECTION, SOLUTION INTRAVENOUS at 15:47

## 2022-10-19 RX ADMIN — TAMSULOSIN HYDROCHLORIDE 0.4 MG: 0.4 CAPSULE ORAL at 10:01

## 2022-10-19 RX ADMIN — QUETIAPINE FUMARATE 100 MG: 100 TABLET ORAL at 21:16

## 2022-10-19 RX ADMIN — FERRIC OXIDE RED: 8; 8 LOTION TOPICAL at 21:16

## 2022-10-19 RX ADMIN — Medication 9 MG: at 18:39

## 2022-10-19 RX ADMIN — CARVEDILOL 12.5 MG: 12.5 TABLET, FILM COATED ORAL at 18:44

## 2022-10-19 RX ADMIN — DEXTROSE AND SODIUM CHLORIDE: 5; 900 INJECTION, SOLUTION INTRAVENOUS at 09:09

## 2022-10-19 RX ADMIN — SODIUM ZIRCONIUM CYCLOSILICATE 5 G: 5 POWDER, FOR SUSPENSION ORAL at 21:17

## 2022-10-19 RX ADMIN — CARVEDILOL 12.5 MG: 12.5 TABLET, FILM COATED ORAL at 10:01

## 2022-10-19 RX ADMIN — ALLOPURINOL 300 MG: 300 TABLET ORAL at 10:01

## 2022-10-19 NOTE — PROGRESS NOTES
10/19/22 1202   Encounter Summary   Encounter Overview/Reason  Spiritual/Emotional Needs   Service Provided For: Patient   Referral/Consult From: Palliative Care   Last Encounter  10/19/22   Complexity of Encounter Low   Spiritual/Emotional needs   Type Spiritual Support   Palliative Care   Type Palliative Care, Follow-up   Assessment/Intervention/Outcome   Assessment Unable to assess   Intervention Prayer (assurance of)/Paoli

## 2022-10-19 NOTE — PROGRESS NOTES
Hospitalist Progress Note  10/18/2022 9:53 PM  Subjective:   Admit Date: 10/3/2022  PCP: Ivelisse Tellez MD     DNR-CCA      C/c:  Chief Complaint   Patient presents with    Altered Mental Status         Interval History: pt resting quietly, not eating, pt waiting for placement in ecf,urine out put poor    Diet: ADULT DIET; Regular  ADULT ORAL NUTRITION SUPPLEMENT; Breakfast, Lunch, Dinner; Standard High Calorie/High Protein Oral Supplement                                ip days:15  Medications:   Scheduled Meds:   sodium zirconium cyclosilicate  5 g Oral TID    carvedilol  12.5 mg Oral BID WC    aspirin  81 mg Oral Daily    Calamine   Topical BID    allopurinol  300 mg Oral Daily    melatonin  9 mg Oral QPM    tamsulosin  0.4 mg Oral Daily    QUEtiapine  100 mg Oral Nightly    galantamine  8 mg Oral BID WC    sodium chloride flush  5-40 mL IntraVENous 2 times per day    [Held by provider] enoxaparin  30 mg SubCUTAneous Daily     Continuous Infusions:   dextrose 5 % and 0.45 % NaCl 75 mL/hr at 10/17/22 1639    sodium chloride Stopped (10/08/22 0132)     PRN Meds:. LORazepam, haloperidol lactate, sodium chloride flush, sodium chloride, acetaminophen, ondansetron **OR** ondansetron     CBC:   Recent Labs     10/17/22  1251   WBC 17.4*   HGB 12.9*        BMP:    Recent Labs     10/17/22  1251 10/18/22  0631    136   K 5.4* 4.5    105   CO2 22 20   BUN 43* 41*   CREATININE 2.29* 2.06*   GLUCOSE 126* 102*     Hepatic:   Recent Labs     10/17/22  1251   AST 33   ALT 28   BILITOT 0.4   ALKPHOS 30*     Troponin: No results for input(s): TROPONINI in the last 72 hours. BNP: No results for input(s): BNP in the last 72 hours. Lipids: No results for input(s): CHOL, HDL in the last 72 hours. Invalid input(s): LDLCALCU  INR: No results for input(s): INR in the last 72 hours.     Objective:   Vitals: BP (!) 170/90   Pulse 78   Temp 99.3 °F (37.4 °C)   Resp 18   Ht 5' 11\" (1.803 m)   Wt 206 lb (93.4 kg)   SpO2 97%   BMI 28.73 kg/m²   General appearance: alert, appears stated age and cooperative  Skin: Skin color, texture, turgor normal. No rashes or lesions  Lungs: clear to auscultation bilaterally  Heart: regular rate and rhythm, S1, S2 normal, no murmur, click, rub or gallop  Abdomen: soft, non-tender; bowel sounds normal; no masses,  no organomegaly  Extremities: extremities normal, atraumatic, no cyanosis or edema  Neurologic: Mental status: Alert, not oriented    Prophylaxis:   DVT with  [] lovenox        [] heparin        [] Scd        [x] none:     Radiology:  CT HEAD WO CONTRAST    Result Date: 10/3/2022  EXAMINATION: CT OF THE HEAD WITHOUT CONTRAST  10/3/2022 11:27 am TECHNIQUE: CT of the head was performed without the administration of intravenous contrast. Automated exposure control, iterative reconstruction, and/or weight based adjustment of the mA/kV was utilized to reduce the radiation dose to as low as reasonably achievable. COMPARISON: 01/21/2021 HISTORY: ORDERING SYSTEM PROVIDED HISTORY: Mental Status Changes TECHNOLOGIST PROVIDED HISTORY: Mental Status Changes Decision Support Exception - unselect if not a suspected or confirmed emergency medical condition->Emergency Medical Condition (MA) Reason for Exam: AMS. FINDINGS: BRAIN/VENTRICLES: There is no acute intracranial hemorrhage, mass effect or midline shift. No abnormal extra-axial fluid collection. The gray-white differentiation is maintained without evidence of an acute infarct. There is no evidence of hydrocephalus. ORBITS: The visualized portion of the orbits demonstrate no acute abnormality. SINUSES: There is mild mucosal thickening in the right frontal, ethmoid, and bilateral sphenoid sinuses. Mild mucosal thickening in the right maxillary sinus. There are bubbly secretions in the posterior right ethmoid sinuses. No air-fluid level. Mastoid air cells are aerated.  SOFT TISSUES/SKULL:  No acute abnormality of the visualized skull or soft tissues. No acute intracranial abnormality. VL Carotid Bilateral    Result Date: 10/5/2022    [facility]  Vascular Carotid Procedure   Patient Name Heidy Bahena Date of Study           10/04/2022               O   Date of      1945  Gender                  Male  Birth   Age          65 year(s)  Race                       Room Number  2069   Corporate ID [id number]  #   Chauncey Krueger [de-identified]  #   MR #         [id number]      [facility]   Accession #  [id number]  Interpreting Physician  Cesario Ramirez   Referring                Referring Physician     Bill Singh  Nurse  Practitioner  Procedure Type of Study:   Cerebral: Carotid, Carotid Scan Bilateral.  Indications for Study:Neck distention and Carotid stenosis. Patient Status: In Patient. Technical Quality:Adequate visualization. Conclusions   Summary   Bilateral:  There is plaque at the level of the carotid bifurcation with a velocity  profile consistent with no significant stenosis . The vertebral arteries  are patent with antegrade flow. Signature   ----------------------------------------------------------------  Electronically signed by Raffy Hernandez(Sonographer) on  10/04/2022 11:36 AM  ----------------------------------------------------------------   ----------------------------------------------------------------  Electronically signed by Cesario Ramirez(Interpreting physician)  on 10/05/2022 06:29 AM  ----------------------------------------------------------------  Findings:   Right Impression:                    Left Impression:  The common carotid artery is normal. The common carotid artery is normal.   The carotid bulb is normal.          The carotid bulb is normal.   The external carotid artery has a    The external carotid artery has a  smooth calcific plaque. smooth calcific plaque.    The internal carotid artery has a    The internal carotid artery has a  smooth calcific plaque causing a     smooth calcific plaque causing a <50%  <50% stenosis based on velocities. stenosis based on velocities. The vertebral artery is patent with  The vertebral artery is patent with  antegrade flow. antegrade flow. Velocities are measured in cm/s ; Diameters are measured in cm Carotid Right Measurements +------------+-------+------+--------+-------+------------+----------------+ ! Location    ! PSV    ! EDV   ! Angle   ! RI     !%Stenosis   ! Tortuosity      ! +------------+-------+------+--------+-------+------------+----------------+ ! Prox CCA    !59.6   ! 10    !        !0.83   !            !                ! +------------+-------+------+--------+-------+------------+----------------+ ! Mid CCA     !52.2   ! 10    !        !0.81   !            !                ! +------------+-------+------+--------+-------+------------+----------------+ ! Dist CCA    !52.2   !11    !        !0.79   !            !                ! +------------+-------+------+--------+-------+------------+----------------+ ! Bulb        !44.7   !11    !        !0.75   !            !                ! +------------+-------+------+--------+-------+------------+----------------+ ! Prox ICA    !37.3   !8     !        !0.79   !            !                ! +------------+-------+------+--------+-------+------------+----------------+ ! Mid ICA     !71     !18    !        !0.74   !            !                ! +------------+-------+------+--------+-------+------------+----------------+ ! Dist ICA    !74.6   !20    !        !0.73   !            !                ! +------------+-------+------+--------+-------+------------+----------------+ ! Prox ECA    !52.8   !5     !        !0.91   !            !                ! +------------+-------+------+--------+-------+------------+----------------+ ! Vertebral   !31.1   !0     !        !1      !            !                ! +------------+-------+------+--------+-------+------------+----------------+   - Additional Measurements:ICAPSV/CCAPSV 1.25. ICAEDV/CCAEDV 2. Carotid Left Measurements +------------+-------+------+--------+-------+------------+----------------+ ! Location    ! PSV    ! EDV   ! Angle   ! RI     !%Stenosis   ! Tortuosity      ! +------------+-------+------+--------+-------+------------+----------------+ ! Prox CCA    !68.3   !13    !        !0.81   !            !                ! +------------+-------+------+--------+-------+------------+----------------+ ! Mid CCA     !82.6   !14    !        !0.83   !            !                ! +------------+-------+------+--------+-------+------------+----------------+ ! Dist CCA    !84.5   !17    !        !0.8    ! !                ! +------------+-------+------+--------+-------+------------+----------------+ ! Bulb        !47.8   ! 10    !        !0.79   !            !                ! +------------+-------+------+--------+-------+------------+----------------+ ! Prox ICA    !75.2   ! 19    !        !0.75   !            !                ! +------------+-------+------+--------+-------+------------+----------------+ ! Mid ICA     !104    !32    !        !0.69   !            !                ! +------------+-------+------+--------+-------+------------+----------------+ ! Dist ICA    ! 97.5   !26    !        !0.73   !            !                ! +------------+-------+------+--------+-------+------------+----------------+ ! Prox ECA    !72.7   !12    !        !0.83   !            !                ! +------------+-------+------+--------+-------+------------+----------------+ ! Vertebral   !34.2   !6     !        !0.82   !            !                ! +------------+-------+------+--------+-------+------------+----------------+   - Additional Measurements:ICAPSV/CCAPSV 1.52. ICAEDV/CCAEDV 2.46. Assessment :   Dementia/stable  Poor oral intake     Plan:   1. Continue present plan  2. Await placement to ecf    Patient Active Problem List:     History of left inguinal hernia     Kidney disease, chronic, stage IV (severe, EGFR 15-29 ml/min) (HCC)     Isolated proteinuria     NSAID long-term use     Essential hypertension     Primary osteoarthritis of hand     Mitral valve prolapse     Interstitial nephritis chronic     MPGN (membranoproliferative glomerulonephritis), type 1     Nephrotic syndrome     Weakness     MILAGRO (acute kidney injury) ([facility] [address]     Urinary incontinence     Gait disturbance     BPH (benign prostatic hyperplasia)     Vascular dementia without behavioral disturbance ([address]     AMS (altered mental status)     Acute encephalopathy     Memory loss     Cognitive impairment      Anticipated Disposition upon discharge: [] Home                                                                         [] Home with Home Health                                                                         [] [facility]                                                                         [] [address]      Patient is admitted as inpatient status because of co-morbidities listed above, severity of signs and symptoms as outlined, requirement for current medical therapies and most importantly because of direct risk to patient if care not provided in a hospital setting.           Jeramy Hoffman MD, MD  Rounding Hospitalist

## 2022-10-19 NOTE — PROGRESS NOTES
Patient held writer's hand but did not engage in conversation;     10/19/22 1936   Encounter Summary   Encounter Overview/Reason  Spiritual/Emotional Needs   Service Provided For: Patient   Referral/Consult From: Palliative Care   Last Encounter  10/19/22   Complexity of Encounter Moderate   Spiritual/Emotional needs   Type Spiritual Support   Palliative Care   Type Palliative Care, Follow-up   Assessment/Intervention/Outcome   Assessment Impaired resilience; Powerlessness   Intervention Prayer (assurance of)/[place];Sustaining Presence/Ministry of presence   Outcome Receptive

## 2022-10-19 NOTE — PROGRESS NOTES
NEPHROLOGY PROGRESS NOTE    Patient :  Joya Yanez; 65 y.o. MRN# [id number]  Location:  2069/2069-01  Attending:  Subhash Elizabeth MD  Admit Date:  10/3/2022   Hospital Day: 12    Reason for consultation: Management of acute kidney injury and chronic kidney disease stage IV. Consulting physician: Aide Ayala MD.    Presenting Complaint: Change in mental status and combative behavior. Interval history: Patient was seen and examined. He remains pleasantly confused but not combative. He does not have an indwelling Hennessy catheter but he has been oliguric. Bladder scan only showed 45 cc of postvoid bladder residual. Laboratory studies shows worsening azotemia. History of Present Illness: This is a 65 y.o. 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 Evelyn Mckenzie with baseline serum creatinine 1.9-2.2 mg/dL. Patient presented to the hospital  on 10/03/2022 with complains of change in mental status more confusion and combative behavior. On admission serum creatinine was noted to be 2.4 mg/dL which  peaked to 3.5 mg/dL on 10/11/2022. He apparently has not been eating or drinking much poor oral intake and appetite but blood pressure has been stable. CT scan of the brain showed no acute intracranial abnormality and chest x-ray Not show pulmonary edema or infiltrates.     Current Medications:    dextrose 5 % and 0.9 % sodium chloride infusion, Continuous  sodium zirconium cyclosilicate (LOKELMA) oral suspension 5 g, TID  carvedilol (COREG) tablet 12.5 mg, BID WC  aspirin chewable tablet 81 mg, Daily  Calamine 8-8 % lotion, BID  LORazepam (ATIVAN) injection 0.5 mg, Q10 Min PRN  allopurinol (ZYLOPRIM) tablet 300 mg, Daily  melatonin tablet 9 mg, QPM  tamsulosin (FLOMAX) capsule 0.4 mg, Daily  haloperidol lactate (HALDOL) injection 5 mg, Q8H PRN  QUEtiapine (SEROQUEL) tablet 100 mg, Nightly  galantamine (RAZADYNE) tablet 8 mg, BID WC  sodium chloride flush 0.9 % injection 5-40 mL, 2 times per day  sodium chloride flush 0.9 % injection 5-40 mL, PRN  0.9 % sodium chloride infusion, PRN  [Held by provider] enoxaparin Sodium (LOVENOX) injection 30 mg, Daily  acetaminophen (TYLENOL) tablet 650 mg, Q4H PRN  ondansetron (ZOFRAN-ODT) disintegrating tablet 4 mg, Q8H PRN   Or  ondansetron (ZOFRAN) injection 4 mg, Q6H PRN    Objective:  CURRENT TEMPERATURE:  Temp: 99.7 °F (37.6 °C)  MAXIMUM TEMPERATURE OVER 24HRS:  Temp (24hrs), Av.4 °F (37.4 °C), Min:98.4 °F (36.9 °C), Max:100.2 °F (37.9 °C)    CURRENT RESPIRATORY RATE:  Resp: 16  CURRENT PULSE:  Heart Rate: 74  CURRENT BLOOD PRESSURE:  BP: (!) 161/83  24HR BLOOD PRESSURE RANGE:  Systolic (29WOG), FBO:111 , Min:161 , QMT:828   ; Diastolic (92VPG), ZNP:69, Min:82, Max:90    24HR INTAKE/OUTPUT:  No intake or output data in the 24 hours ending 10/19/22 1321    Physical Exam:  GENERAL APPEARANCE: Awake and alert but not oriented. HEAD: Normocephalic and atraumatic. EYES: EOMI. Not pale, anicteric   NOSE:  No nasal discharge. THROAT:  Oral cavity and pharynx normal. Moist  CARDIAC: Normal S1 and S2. No S3, S4 or murmurs. Rhythm is regular. LUNGS: Clear to auscultation and percussion without rales, rhonchi, wheezing or diminished breath sounds. ABDOMEN: Full, soft with normal bowel sounds. EXTREMITIES: No edema. Peripheral pulses intact. NEURO: Confused but with no focal signs. Assessment/plan:    1. Acute kidney injury and chronic kidney disease stage IV - acute kidney injury most consistent with prerenal azotemia with no clear evidence of acute urinary retention at this time. Renal function is improving with hydration. Plan: Change IV fluid to D5/0.9 normal saline at 50 mL/h. Continue to hold Lovenox and spironolactone. Basic metabolic profile daily. 2.  Systemic hypertension - Continue carvedilol to 12.5 mg p.o. twice daily.     3.  Non-anion gap metabolic acidosis - we will add bicarbonate to IV fluid. 4.  Hyponatremia - check serum osmolality. Prognosis is guarded.  ECF discharge is pending insurance approval    Electronically signed by Den Curry MD on 10/19/2022 at 1:21 PM

## 2022-10-19 NOTE — CONSULTS
MPGN (membranoproliferative glomerulonephritis), type 1, MVP (mitral valve prolapse), Neck pain, Nephrotic syndrome, NSAID long-term use, Primary osteoarthritis of hand, Urination, excessive at night, and Vascular dementia without behavioral disturbance ([address] .  Currently hospitalized for the management of Altered Mental Status. The Palliative Care Team is following to assist with patient and family support and goals of care. Vital Signs  Blood pressure (!) 161/82, pulse 74, temperature 100.2 °F (37.9 °C), resp. rate 16, height 5' 11\" (1.803 m), weight 206 lb (93.4 kg), SpO2 96 %. Patient Active Problem List   Diagnosis    History of left inguinal hernia    Kidney disease, chronic, stage IV (severe, EGFR 15-29 ml/min) ([facility])    Isolated proteinuria    NSAID long-term use    Essential hypertension    Primary osteoarthritis of hand    Mitral valve prolapse    Interstitial nephritis chronic    MPGN (membranoproliferative glomerulonephritis), type 1    Nephrotic syndrome    Weakness    MILAGRO (acute kidney injury) ([address]    Urinary incontinence    Gait disturbance    BPH (benign prostatic hyperplasia)    Vascular dementia without behavioral disturbance ([address]    AMS (altered mental status)    Acute encephalopathy    Memory loss    Cognitive impairment       Palliative Interaction:  Reviewed course of hospital stay. Reviewed [facility] documents on file. Primary Decision maker per [facility] is Jacinto Casey  Secondary Decision Maker per [address] is Huy Ellis. New ACP note entered in chart and updated decision maker portion in epic. Updates received from bedside nurse Ana Rosa Webb. Met  with patient this am at bedside. He opens eyes. Reaches for writer, but I can not understand him. I sat with him for a few minutes. I let him know I would come back. I returned and met with Brent Tubbs patient's wife. She is sitting at bedside and I sat across from her.       We talked about his care needs getting to be more then she can manage, some aggressive behaviors. She relates he has gone down hill the past couple weeks. He was going to grocery store and using cart to walk and going to [employer] with her with walker. He has really become weak and is not eating much. Plan is to go to [facility] thinks tomorrow. We talked about dialysis and feeding tube and [place] relates he would not want that. She relates that she has already talked to doctor about his kidney status and that they would decline dialysis. She also relates he would not want a feeding tube to keep him alive. We talked about dementia and how sometimes patient's forget how to eat. We talked about aggressive behavior being a part of dementia as well and not intended for her. It is just that he does not know the people he once did.           Goals/Plan of care  Education/support to staff  Education/support to family  Education/support to patient  Communications with primary service  Providing support for coping/adaptation/distress of family  Providing support for coping/adaptation/distress of patient  Discussing meaning/purpose   Specific spiritual beliefs/practices  Continue with current plan of care  Provided information about hospice  Validating patient/family distress  Continued communication updates  Recognizing, reflecting, and empathizing with family members' anticipatory grief      Palliative Care Coordinator  Kathleen HAYNES, RN, ONN-CG    [address] [facility] Office: [address] [facility] [place] Office: [phone]  [place] Office: [phone]    For [facility] Management Clinic scheduling please call [phone]

## 2022-10-19 NOTE — PROGRESS NOTES
NEPHROLOGY PROGRESS NOTE    Patient :  Jerel Crockett; 65 y.o. MRN# [id number]  Location:  2069/2069-01  Attending:  Kassidy Monzon MD  Admit Date:  10/3/2022   Hospital Day: 12    Reason for consultation: Management of acute kidney injury and chronic kidney disease stage IV. Consulting physician: Makenna Willard MD.    Presenting Complaint: Change in mental status and combative behavior. Interval history: Patient was seen and examined. He remains pleasantly confused but not combative. He does not have an indwelling Hennessy catheter but he has been oliguric. Bladder scan only showed 45 cc of postvoid bladder residual. Laboratory studies shows worsening azotemia. History of Present Illness: This is a 65 y.o. 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 Katty Juan with baseline serum creatinine 1.9-2.2 mg/dL. Patient presented to the hospital  on 10/03/2022 with complains of change in mental status more confusion and combative behavior. On admission serum creatinine was noted to be 2.4 mg/dL which  peaked to 3.5 mg/dL on 10/11/2022. He apparently has not been eating or drinking much poor oral intake and appetite but blood pressure has been stable. CT scan of the brain showed no acute intracranial abnormality and chest x-ray Not show pulmonary edema or infiltrates.     Current Medications:    dextrose 5 % and 0.9 % sodium chloride infusion, Continuous  sodium zirconium cyclosilicate (LOKELMA) oral suspension 5 g, TID  carvedilol (COREG) tablet 12.5 mg, BID WC  aspirin chewable tablet 81 mg, Daily  Calamine 8-8 % lotion, BID  LORazepam (ATIVAN) injection 0.5 mg, Q10 Min PRN  allopurinol (ZYLOPRIM) tablet 300 mg, Daily  melatonin tablet 9 mg, QPM  tamsulosin (FLOMAX) capsule 0.4 mg, Daily  haloperidol lactate (HALDOL) injection 5 mg, Q8H PRN  QUEtiapine (SEROQUEL) tablet 100 mg, Nightly  galantamine (RAZADYNE) tablet 8 mg, BID WC  sodium chloride flush 0.9 % injection 5-40 mL, 2 times per day  sodium chloride flush 0.9 % injection 5-40 mL, PRN  0.9 % sodium chloride infusion, PRN  [Held by provider] enoxaparin Sodium (LOVENOX) injection 30 mg, Daily  acetaminophen (TYLENOL) tablet 650 mg, Q4H PRN  ondansetron (ZOFRAN-ODT) disintegrating tablet 4 mg, Q8H PRN   Or  ondansetron (ZOFRAN) injection 4 mg, Q6H PRN    Objective:  CURRENT TEMPERATURE:  Temp: 99.7 °F (37.6 °C)  MAXIMUM TEMPERATURE OVER 24HRS:  Temp (24hrs), Av.4 °F (37.4 °C), Min:98.4 °F (36.9 °C), Max:100.2 °F (37.9 °C)    CURRENT RESPIRATORY RATE:  Resp: 16  CURRENT PULSE:  Heart Rate: 74  CURRENT BLOOD PRESSURE:  BP: (!) 161/83  24HR BLOOD PRESSURE RANGE:  Systolic (32JML), WVF:122 , Min:161 , ZGX:924   ; Diastolic (39UFU), GJV:68, Min:82, Max:90    24HR INTAKE/OUTPUT:  No intake or output data in the 24 hours ending 10/19/22 1325    Physical Exam:  GENERAL APPEARANCE: Awake and alert but not oriented. HEAD: Normocephalic and atraumatic. EYES: EOMI. Not pale, anicteric   NOSE:  No nasal discharge. THROAT:  Oral cavity and pharynx normal. Moist  CARDIAC: Normal S1 and S2. No S3, S4 or murmurs. Rhythm is regular. LUNGS: Clear to auscultation and percussion without rales, rhonchi, wheezing or diminished breath sounds. ABDOMEN: Full, soft with normal bowel sounds. EXTREMITIES: No edema. Peripheral pulses intact. NEURO: Confused but with no focal signs. Assessment/plan:    1. Acute kidney injury and chronic kidney disease stage IV - acute kidney injury most consistent with prerenal azotemia with no clear evidence of acute urinary retention at this time. Renal function is improving with hydration. Plan: Increase isotonic IV fluid to 75 mL/h  Continue to hold Lovenox and spironolactone. Basic metabolic profile daily. 2.  Systemic hypertension - Continue carvedilol to 12.5 mg p.o. twice daily.     3.  Non-anion gap metabolic acidosis - we will add bicarbonate to IV fluid. 4.  Hyponatremia - check serum osmolality. Prognosis is guarded.  ECF discharge is pending insurance approval    Electronically signed by Ron oLvett MD on 10/19/2022 at 1:25 PM

## 2022-10-19 NOTE — ACP (ADVANCE CARE PLANNING)
Advance Care Planning     Advance Care Planning Activator (Inpatient)  Conversation Note      Date of ACP Conversation: 10/19/2022     Information for ACP received from [facility] that was provided to [facility] 10/7/22. The [facility] for Benjamín Dozier was executed on 4/30/21. The Primary decision maker  Demetri Matthew and alternate decision maker Myla Mota are noted in [facility] document. ACP Activator: Kaz Ramírez RN      Health Care Decision Maker:     Current Designated Health Care Decision Maker:  per [facility]    Primary Decision Maker: Linda Razo, [address] [place] [address], cell [phone]  Secondary Decision Maker: Danii [facility]- [phone]    Today we documented Decision Maker(s) consistent with ACP documents on file.           [] Yes   [x] No   Educated Patient / Madonna Ortega regarding differences between Advance Directives and portable DNR orders. Length of ACP Conversation in minutes:      Conversation Outcomes:  [] ACP discussion completed  [] Existing advance directive reviewed with patient; no changes to patient's previously recorded wishes  [] New Advance Directive completed  [] Portable Do Not Rescitate prepared for Provider review and signature  [] POLST/POST/MOLST/MOST prepared for Provider review and signature      Follow-up plan:    [] Schedule follow-up conversation to continue planning  [] Referred individual to Provider for additional questions/concerns   [] Advised patient/agent/surrogate to review completed ACP document and update if needed with changes in condition, patient preferences or care setting    [] This note routed to one or more involved healthcare providers    Decision makers updated in chart as per existing [facility] documentation.         [facility] Coordinator  Maddy Finney BSN, [facility], [facility]  [address] [place] [address] [phone]

## 2022-10-19 NOTE — CARE COORDINATION
Prachi Rowe from [facility] program informed SW that patient has pending medicaid application number [id number]. Still waiting on approval from [address]. Bernie Leann is still reviewing patient, and waiting on asset check. Expedited appeal still pending. Electronically signed by JAMILAH Andrews on 10/19/2022 at 1:45 PM     [facility] CAN ACCEPT PATIENT WITH PENDING MEDICAID! MARK FOLLOWING FOR PATIENT TO BE MEDICALLY READY TO DISCHARGE. MARK INFORMED SPOUSE. MARK INFORMED ANNABEL ALLEN.  Electronically signed by JAMILAH Andrews on 10/19/2022 at 2:25 PM

## 2022-10-19 NOTE — PROGRESS NOTES
[address]    INPATIENT OCCUPATIONAL THERAPY  PROGRESS NOTE  Date: 10/19/2022  Patient Name: Karina Taylor       Room:   MRN: [id number]    : 1945  (65 y.o.)  Gender: male   Referring Practitioner: Octavia Mcintosh MD  Diagnosis: AMS    Restrictions/Precautions            Subjective  Subjective  Subjective: Pt. resting in bed upon arrival. Difficult to wake initially- but given time he became alert. Wife present.   Pain: Denies Pain     Objective  Orientation  Overall Orientation Status: Impaired  Orientation Level: Disoriented X4  Cognition  Overall Cognitive Status: Exceptions  Arousal/Alertness: Inconsistent responses to stimuli;Delayed responses to stimuli  Following Commands: Inconsistently follows commands  Attention Span: Difficulty attending to directions  Memory: Decreased recall of biographical Information;Decreased recall of precautions;Decreased recall of recent events;Decreased short term memory;Decreased long term memory  Safety Judgement: Decreased awareness of need for safety  Problem Solving: Assistance required to identify errors made  Insights: Not aware of deficits  Initiation: Requires cues for all  Sequencing: Requires cues for all  Cognition Comment: pt pleasantly confused, cooperative with increased time, fatigued  ADL  Feeding: Dependent/Total  Grooming: Dependent/Total  UE Bathing: Dependent/Total  Toileting: Dependent/Total  Toileting Skilled Clinical Factors: incontinent of urine this date, [facility] for hygiene and brief change    Balance     Transfers/Mobility  Bed mobility  Rolling to Left: 2 Person assistance;Dependent/Total  Rolling to Right: 2 Person assistance;Dependent/Total  Supine to Sit: 2 Person assistance;Dependent/Total  Sit to Supine: 2 Person assistance;Dependent/Total    Functional Activities  OT Exercises  A/AROM Exercises: Instruction and assistance during B UE A/AAROM exercises, in all planes, 10 reps x2 of each, for contracture prevention and to increase patients ability to engage in daily self care. Pressure Relief Exercises: Writer ensuring patient repositioned appropriately after session for skin protection. Patient Education  Patient Education  Education Given To: Patient, Family  Education Provided: Role of Therapy, Plan of Care, Transfer Training  Education Method: Verbal, Demonstration  Barriers to Learning: Cognition  Education Outcome: Continued education needed      Goals  Short Term Goals  Time Frame for Short Term Goals: By discharge  Short Term Goal 1: Pt will complete upper body dressing/bathing with min A and Good attention to task  Short Term Goal 2: Pt will complete simple grooming task/self feeding with SBA and min verbal cues  Short Term Goal 3: Pt will complete lower body dressing/bathing with Max A and Good attention to task  Short Term Goal 4: Pt will complete functional transfers/mobility with mod A and Good safety with use of least restrictive device  Short Term Goal 5: Pt will follow simple 1-2 step commands 75% of the time to increase participation in daily activities  Additional Goals?: Yes  Short Term Goal 6: Pt will participate in 15+ minutes of therapeutic exercies/functional activities to increase safety and independence with self care and mobility  Occupational Therapy Plan  Times Per Week: 4-6  Times Per Day:  Once a day  Current Treatment Recommendations: Self-Care / ADL, Strengthening, ROM, Balance training, Functional mobility training, Endurance training, Cognitive reorientation, Pain management, Safety education & training, Patient/Caregiver education & training, Equipment evaluation, education, & procurement, Cognitive/Perceptual training, Coordination training      Assessment  Activity Tolerance  Activity Tolerance: Treatment limited secondary to decreased cognition, Patient limited by fatigue  Assessment  Performance deficits / Impairments: Decreased ADL status, Decreased functional mobility , Decreased strength, Decreased safe awareness, Decreased cognition, Decreased endurance, Decreased balance, Decreased high-level IADLs, Decreased fine motor control, Decreased coordination, Decreased posture  Assessment: pillows and wedge used to support pt while sitting in recliner, NSG made aware  Treatment Diagnosis: Impaired self care status  Prognosis: Fair  Decision Making: Medium Complexity  Discharge Recommendations: Patient would benefit from continued therapy after discharge  OT Equipment Recommendations  Other: TBD  Safety Devices  Type of Devices:  All bernardino prominences offloaded, All fall risk precautions in place, Bed alarm in place, Call light within reach, Patient at risk for falls, Left in bed, Nurse notified      AM-[facility] Daily Activities Inpatient  AM-PAC Daily Activity Inpatient   How much help for putting on and taking off regular lower body clothing?: Total  How much help for Bathing?: Total  How much help for Toileting?: Total  How much help for putting on and taking off regular upper body clothing?: Total  How much help for taking care of personal grooming?: Total  How much help for eating meals?: Total  AM-PAC Inpatient Daily Activity Raw Score: 6  AM-PAC Inpatient ADL T-Scale Score : 17.07  ADL Inpatient CMS 0-100% Score: 100  ADL Inpatient CMS G-Code Modifier : CN    OT Minutes  OT Individual Minutes  Time In: 1545  Time Out: [facility]  [facility]

## 2022-10-20 VITALS
HEART RATE: 69 BPM | WEIGHT: 206 LBS | RESPIRATION RATE: 18 BRPM | BODY MASS INDEX: 28.84 KG/M2 | TEMPERATURE: 98.8 F | DIASTOLIC BLOOD PRESSURE: 77 MMHG | SYSTOLIC BLOOD PRESSURE: 137 MMHG | OXYGEN SATURATION: 97 % | HEIGHT: 71 IN

## 2022-10-20 LAB
ANION GAP SERPL CALCULATED.3IONS-SCNC: 11 MMOL/L (ref 9–17)
BUN BLDV-MCNC: 47 MG/DL (ref 8–23)
CALCIUM SERPL-MCNC: 8.4 MG/DL (ref 8.6–10.4)
CHLORIDE BLD-SCNC: 103 MMOL/L (ref 98–107)
CO2: 21 MMOL/L (ref 20–31)
CREAT SERPL-MCNC: 2.12 MG/DL (ref 0.7–1.2)
GFR SERPL CREATININE-BSD FRML MDRD: 31 ML/MIN/1.73M2
GLUCOSE BLD-MCNC: 96 MG/DL (ref 70–99)
POTASSIUM SERPL-SCNC: 4.2 MMOL/L (ref 3.7–5.3)
SODIUM BLD-SCNC: 135 MMOL/L (ref 135–144)

## 2022-10-20 PROCEDURE — 2580000003 HC RX 258: Performed by: INTERNAL MEDICINE

## 2022-10-20 PROCEDURE — 36415 COLL VENOUS BLD VENIPUNCTURE: CPT

## 2022-10-20 PROCEDURE — 2500000003 HC RX 250 WO HCPCS: Performed by: INTERNAL MEDICINE

## 2022-10-20 PROCEDURE — 6370000000 HC RX 637 (ALT 250 FOR IP): Performed by: FAMILY MEDICINE

## 2022-10-20 PROCEDURE — 80048 BASIC METABOLIC PNL TOTAL CA: CPT

## 2022-10-20 PROCEDURE — 6370000000 HC RX 637 (ALT 250 FOR IP): Performed by: INTERNAL MEDICINE

## 2022-10-20 PROCEDURE — 6370000000 HC RX 637 (ALT 250 FOR IP): Performed by: PSYCHIATRY & NEUROLOGY

## 2022-10-20 RX ORDER — QUETIAPINE FUMARATE 100 MG/1
100 TABLET, FILM COATED ORAL NIGHTLY
Qty: 60 TABLET | Refills: 3 | Status: SHIPPED | OUTPATIENT
Start: 2022-10-20

## 2022-10-20 RX ORDER — DEXTROSE AND SODIUM CHLORIDE 5; .9 G/100ML; G/100ML
INJECTION, SOLUTION INTRAVENOUS CONTINUOUS
Status: DISCONTINUED | OUTPATIENT
Start: 2022-10-20 | End: 2022-10-20 | Stop reason: HOSPADM

## 2022-10-20 RX ORDER — CARVEDILOL 25 MG/1
25 TABLET ORAL 2 TIMES DAILY WITH MEALS
Status: DISCONTINUED | OUTPATIENT
Start: 2022-10-20 | End: 2022-10-20 | Stop reason: HOSPADM

## 2022-10-20 RX ORDER — LANOLIN ALCOHOL/MO/W.PET/CERES
9 CREAM (GRAM) TOPICAL EVERY EVENING
Qty: 30 TABLET | Refills: 0
Start: 2022-10-20

## 2022-10-20 RX ORDER — CARVEDILOL 25 MG/1
25 TABLET ORAL 2 TIMES DAILY WITH MEALS
Qty: 60 TABLET | Refills: 3 | Status: SHIPPED | OUTPATIENT
Start: 2022-10-20

## 2022-10-20 RX ORDER — IODINE/SODIUM IODIDE 2 %
TINCTURE TOPICAL 2 TIMES DAILY
Qty: 118 ML | Refills: 0
Start: 2022-10-20

## 2022-10-20 RX ADMIN — TAMSULOSIN HYDROCHLORIDE 0.4 MG: 0.4 CAPSULE ORAL at 07:43

## 2022-10-20 RX ADMIN — GALANTAMINE 8 MG: 4 TABLET, FILM COATED ORAL at 11:26

## 2022-10-20 RX ADMIN — DEXTROSE AND SODIUM CHLORIDE: 5; 900 INJECTION, SOLUTION INTRAVENOUS at 11:21

## 2022-10-20 RX ADMIN — ALLOPURINOL 300 MG: 300 TABLET ORAL at 07:43

## 2022-10-20 RX ADMIN — ASPIRIN 81 MG: 81 TABLET, CHEWABLE ORAL at 07:43

## 2022-10-20 RX ADMIN — SODIUM ZIRCONIUM CYCLOSILICATE 5 G: 5 POWDER, FOR SUSPENSION ORAL at 11:28

## 2022-10-20 RX ADMIN — CARVEDILOL 12.5 MG: 12.5 TABLET, FILM COATED ORAL at 07:43

## 2022-10-20 RX ADMIN — SODIUM BICARBONATE: 84 INJECTION, SOLUTION INTRAVENOUS at 04:51

## 2022-10-20 RX ADMIN — FERRIC OXIDE RED: 8; 8 LOTION TOPICAL at 11:20

## 2022-10-20 NOTE — PROGRESS NOTES
Hospitalist Progress Note  10/19/2022 9:24 PM  Subjective:   Admit Date: 10/3/2022  PCP: Becky Schmitt MD     DNR-CCA      C/c:  Chief Complaint   Patient presents with    Altered Mental Status         Interval History: pt not eating well, poor oral intake    Diet: ADULT DIET; Regular  ADULT ORAL NUTRITION SUPPLEMENT; Breakfast, Lunch, Dinner; Standard High Calorie/High Protein Oral Supplement                                ip days:16  Medications:   Scheduled Meds:   sodium zirconium cyclosilicate  5 g Oral TID    carvedilol  12.5 mg Oral BID WC    aspirin  81 mg Oral Daily    Calamine   Topical BID    allopurinol  300 mg Oral Daily    melatonin  9 mg Oral QPM    tamsulosin  0.4 mg Oral Daily    QUEtiapine  100 mg Oral Nightly    galantamine  8 mg Oral BID WC    sodium chloride flush  5-40 mL IntraVENous 2 times per day    [Held by provider] enoxaparin  30 mg SubCUTAneous Daily     Continuous Infusions:   IV infusion builder 75 mL/hr at 10/19/22 1547    sodium chloride Stopped (10/08/22 0132)     PRN Meds:. LORazepam, haloperidol lactate, sodium chloride flush, sodium chloride, acetaminophen, ondansetron **OR** ondansetron     CBC:   Recent Labs     10/17/22  1251   WBC 17.4*   HGB 12.9*        BMP:    Recent Labs     10/17/22  1251 10/18/22  0631 10/19/22  0613    136 133*   K 5.4* 4.5 4.3    105 103   CO2 22 20 19*   BUN 43* 41* 41*   CREATININE 2.29* 2.06* 2.34*   GLUCOSE 126* 102* 122*     Hepatic:   Recent Labs     10/17/22  1251   AST 33   ALT 28   BILITOT 0.4   ALKPHOS 30*     Troponin: No results for input(s): TROPONINI in the last 72 hours. BNP: No results for input(s): BNP in the last 72 hours. Lipids: No results for input(s): CHOL, HDL in the last 72 hours. Invalid input(s): LDLCALCU  INR: No results for input(s): INR in the last 72 hours.     Objective:   Vitals: BP (!) 144/77   Pulse 70   Temp 99.1 °F (37.3 °C)   Resp 16   Ht 5' 11\" (1.803 m)   Wt 206 lb (93.4 kg) SpO2 95%   BMI 28.73 kg/m²   General appearance: alert, appears stated age and cooperative  Skin: Skin color, texture, turgor normal. No rashes or lesions  Lungs: clear to auscultation bilaterally  Heart: regular rate and rhythm, S1, S2 normal, no murmur, click, rub or gallop  Abdomen: soft, non-tender; bowel sounds normal; no masses,  no organomegaly  Extremities: extremities normal, atraumatic, no cyanosis or edema  Neurologic: Mental status: Alert, oriented, thought content  poor    Prophylaxis:   DVT with  [] lovenox        [] heparin        [] Scd        [] none:     Radiology:  CT HEAD WO CONTRAST    Result Date: 10/3/2022  EXAMINATION: CT OF THE HEAD WITHOUT CONTRAST  10/3/2022 11:27 am TECHNIQUE: CT of the head was performed without the administration of intravenous contrast. Automated exposure control, iterative reconstruction, and/or weight based adjustment of the mA/kV was utilized to reduce the radiation dose to as low as reasonably achievable. COMPARISON: 01/21/2021 HISTORY: ORDERING SYSTEM PROVIDED HISTORY: Mental Status Changes TECHNOLOGIST PROVIDED HISTORY: Mental Status Changes Decision Support Exception - unselect if not a suspected or confirmed emergency medical condition->Emergency Medical Condition (MA) Reason for Exam: AMS. FINDINGS: BRAIN/VENTRICLES: There is no acute intracranial hemorrhage, mass effect or midline shift. No abnormal extra-axial fluid collection. The gray-white differentiation is maintained without evidence of an acute infarct. There is no evidence of hydrocephalus. ORBITS: The visualized portion of the orbits demonstrate no acute abnormality. SINUSES: There is mild mucosal thickening in the right frontal, ethmoid, and bilateral sphenoid sinuses. Mild mucosal thickening in the right maxillary sinus. There are bubbly secretions in the posterior right ethmoid sinuses. No air-fluid level. Mastoid air cells are aerated.  SOFT TISSUES/SKULL:  No acute abnormality of the visualized skull or soft tissues. No acute intracranial abnormality. VL Carotid Bilateral    Result Date: 10/5/2022    [facility]  [facility]   Patient Name Parrish Fong Date of Study           10/04/2022               O   Date of      1945  Gender                  Male  Birth   Age          65 year(s)  Race                       Room Number  2069   Corporate ID [id number]  #   Patient Diane Daugherty [de-identified]  #   MR #         [id number]      Myra Mason   Accession #  [id number]  Interpreting Physician  Cesario Ramirez   Referring                Referring Physician     Penny Mcfarland  Nurse  Practitioner  Procedure Type of Study:   Cerebral: Carotid, Carotid Scan Bilateral.  Indications for Study:Neck distention and Carotid stenosis. Patient Status: In Patient. Technical Quality:Adequate visualization. Conclusions   Summary   Bilateral:  There is plaque at the level of the carotid bifurcation with a velocity  profile consistent with no significant stenosis . The vertebral arteries  are patent with antegrade flow. Signature   ----------------------------------------------------------------  Electronically signed by Raffy Ruiz(Sonographer) on  10/04/2022 11:36 AM  ----------------------------------------------------------------   ----------------------------------------------------------------  Electronically signed by Cesario Ramirez(Interpreting physician)  on 10/05/2022 06:29 AM  ----------------------------------------------------------------  Findings:   Right Impression:                    Left Impression:  The common carotid artery is normal. The common carotid artery is normal.   The carotid bulb is normal.          The carotid bulb is normal.   The external carotid artery has a    The external carotid artery has a  smooth calcific plaque. smooth calcific plaque.    The internal carotid artery has a    The internal carotid artery has a  smooth calcific plaque causing a     smooth calcific plaque causing a <50%  <50% stenosis based on velocities. stenosis based on velocities. The vertebral artery is patent with  The vertebral artery is patent with  antegrade flow. antegrade flow. Velocities are measured in cm/s ; Diameters are measured in cm Carotid Right Measurements +------------+-------+------+--------+-------+------------+----------------+ ! Location    ! PSV    ! EDV   ! Angle   ! RI     !%Stenosis   ! Tortuosity      ! +------------+-------+------+--------+-------+------------+----------------+ ! Prox CCA    !59.6   ! 10    !        !0.83   !            !                ! +------------+-------+------+--------+-------+------------+----------------+ ! Mid CCA     !52.2   ! 10    !        !0.81   !            !                ! +------------+-------+------+--------+-------+------------+----------------+ ! Dist CCA    !52.2   !11    !        !0.79   !            !                ! +------------+-------+------+--------+-------+------------+----------------+ ! Bulb        !44.7   !11    !        !0.75   !            !                ! +------------+-------+------+--------+-------+------------+----------------+ ! Prox ICA    !37.3   !8     !        !0.79   !            !                ! +------------+-------+------+--------+-------+------------+----------------+ ! Mid ICA     !71     !18    !        !0.74   !            !                ! +------------+-------+------+--------+-------+------------+----------------+ ! Dist ICA    !74.6   !20    !        !0.73   !            !                ! +------------+-------+------+--------+-------+------------+----------------+ ! Prox ECA    !52.8   !5     !        !0.91   !            !                ! +------------+-------+------+--------+-------+------------+----------------+ ! Vertebral   !31.1   !0     !        !1      !            !                ! +------------+-------+------+--------+-------+------------+----------------+   - Additional Measurements:ICAPSV/CCAPSV 1.25. ICAEDV/CCAEDV 2. Carotid Left Measurements +------------+-------+------+--------+-------+------------+----------------+ ! Location    ! PSV    ! EDV   ! Angle   ! RI     !%Stenosis   ! Tortuosity      ! +------------+-------+------+--------+-------+------------+----------------+ ! Prox CCA    !68.3   !13    !        !0.81   !            !                ! +------------+-------+------+--------+-------+------------+----------------+ ! Mid CCA     !82.6   !14    !        !0.83   !            !                ! +------------+-------+------+--------+-------+------------+----------------+ ! Dist CCA    !84.5   !17    !        !0.8    ! !                ! +------------+-------+------+--------+-------+------------+----------------+ ! Bulb        !47.8   ! 10    !        !0.79   !            !                ! +------------+-------+------+--------+-------+------------+----------------+ ! Prox ICA    !75.2   ! 19    !        !0.75   !            !                ! +------------+-------+------+--------+-------+------------+----------------+ ! Mid ICA     !104    !32    !        !0.69   !            !                ! +------------+-------+------+--------+-------+------------+----------------+ ! Dist ICA    ! 97.5   !26    !        !0.73   !            !                ! +------------+-------+------+--------+-------+------------+----------------+ ! Prox ECA    !72.7   !12    !        !0.83   !            !                ! +------------+-------+------+--------+-------+------------+----------------+ ! Vertebral   !34.2   !6     !        !0.82   !            !                ! +------------+-------+------+--------+-------+------------+----------------+   - Additional Measurements:ICAPSV/CCAPSV 1.52. ICAEDV/CCAEDV 2.46. Assessment :   Dementia/poor oral intake  Ckd/stage 4     Plan:   1.   Continue present care/iv fluids  2. See order    Patient Active Problem List:     History of left inguinal hernia     Kidney disease, chronic, stage IV (severe, EGFR 15-29 ml/min) (HCC)     Isolated proteinuria     NSAID long-term use     Essential hypertension     Primary osteoarthritis of hand     Mitral valve prolapse     Interstitial nephritis chronic     MPGN (membranoproliferative glomerulonephritis), type 1     Nephrotic syndrome     Weakness     MILAGRO (acute kidney injury) ([address]     Urinary incontinence     Gait disturbance     BPH (benign prostatic hyperplasia)     Vascular dementia without behavioral disturbance ([address]     AMS (altered mental status)     Acute encephalopathy     Memory loss     Cognitive impairment      Anticipated Disposition upon discharge: [] Home                                                                         [] Home with Home Health                                                                         [] [facility]                                                                         [] [address]      Patient is admitted as inpatient status because of co-morbidities listed above, severity of signs and symptoms as outlined, requirement for current medical therapies and most importantly because of direct risk to patient if care not provided in a hospital setting.           Brina Wang MD, MD  Rounding Hospitalist

## 2022-10-20 NOTE — PROGRESS NOTES
10/20/22 1135   Encounter Summary   Encounter Overview/Reason  Spiritual/Emotional Needs   Service Provided For: Patient   Referral/Consult From: Palliative Care   Last Encounter  10/20/22   Complexity of Encounter Low   Spiritual/Emotional needs   Type Spiritual Support   Palliative Care   Type Palliative Care, Follow-up   Assessment/Intervention/Outcome   Assessment Unable to assess  (Nursing attempting to get patient to take a drink.)   Intervention Prayer (assurance of)/[place]

## 2022-10-20 NOTE — DISCHARGE SUMMARY
Hospitalist Discharge Summary    Wade Nugyen  :  1945  MRN:  [id number]    Admit date:  10/3/2022  Discharge date:  10/20/22    Admitting Physician:  Whitney Pollard MD    Discharge Diagnoses:   Patient Active Problem List   Diagnosis    History of left inguinal hernia    Kidney disease, chronic, stage IV (severe, EGFR 15-29 ml/min) ([facility])    Isolated proteinuria    NSAID long-term use    Essential hypertension    Primary osteoarthritis of hand    Mitral valve prolapse    Interstitial nephritis chronic    MPGN (membranoproliferative glomerulonephritis), type 1    Nephrotic syndrome    Weakness    MILAGRO (acute kidney injury) ([facility] [address]    Urinary incontinence    Gait disturbance    BPH (benign prostatic hyperplasia)    Vascular dementia without behavioral disturbance ([address]    AMS (altered mental status)    Acute encephalopathy    Memory loss    Cognitive impairment        Admission Condition:  fair      Discharged Condition:  fair    Hospital Course/Treatments   admitted with ac confusion, pt has start of dementia,pt was very agitated, pt has been  receiving haldol and  seroquel, pt became less agitated, pt doing better, pt has had poor oral intake, pt also has ckd stage 3 and under care of neohro, pt was seen by neuro and mri and ct done showed no reversible problem, pt will be d/c with following meds    Discharge Medications:         Medication List        START taking these medications      Calamine 8-8 % Lotn lotion  Apply topically 2 times daily     melatonin 3 MG Tabs tablet  Take 3 tablets by mouth every evening  Replaces: melatonin 10 MG Caps capsule     QUEtiapine 100 MG tablet  Commonly known as: SEROQUEL  Take 1 tablet by mouth nightly     sodium zirconium cyclosilicate 5 g Pack oral suspension  Commonly known as: LOKELMA  Take 5 g by mouth 3 times daily            CHANGE how you take these medications      carvedilol 25 MG tablet  Commonly known as: COREG  Take 1 tablet by mouth 2 times daily (with Exception - unselect if not a suspected or confirmed emergency medical condition->Emergency Medical Condition (MA) Reason for Exam: AMS. FINDINGS: BRAIN/VENTRICLES: There is no acute intracranial hemorrhage, mass effect or midline shift. No abnormal extra-axial fluid collection. The gray-white differentiation is maintained without evidence of an acute infarct. There is no evidence of hydrocephalus. ORBITS: The visualized portion of the orbits demonstrate no acute abnormality. SINUSES: There is mild mucosal thickening in the right frontal, ethmoid, and bilateral sphenoid sinuses. Mild mucosal thickening in the right maxillary sinus. There are bubbly secretions in the posterior right ethmoid sinuses. No air-fluid level. Mastoid air cells are aerated. SOFT TISSUES/SKULL:  No acute abnormality of the visualized skull or soft tissues. No acute intracranial abnormality. VL Carotid Bilateral    Result Date: 10/5/2022    [address] [place] [address]  Vascular Carotid Procedure   Patient Name Lisa Powers Date of Study           10/04/2022               O   Date of      1945  Gender                  Male  Birth   Age          65 year(s)  Race                       Room Number  2069   Corporate ID [id number]  #   Patient Pati [de-identified]  #   MR #         [id number]      Meliza Garcias   Accession #  [id number]  Interpreting Physician  Cesario Ramirez   Referring                Referring Physician     Sawyer Fermin  Nurse  Practitioner  Procedure Type of Study:   Cerebral: Carotid, Carotid Scan Bilateral.  Indications for Study:Neck distention and Carotid stenosis. Patient Status: In Patient. Technical Quality:Adequate visualization. Conclusions   Summary   Bilateral:  There is plaque at the level of the carotid bifurcation with a velocity  profile consistent with no significant stenosis . The vertebral arteries  are patent with antegrade flow.    Signature ----------------------------------------------------------------  Electronically signed by Raffy Puckett(Sonographer) on  10/04/2022 11:36 AM  ----------------------------------------------------------------   ----------------------------------------------------------------  Electronically signed by Cesario Ramirez(Interpreting physician)  on 10/05/2022 06:29 AM  ----------------------------------------------------------------  Findings:   Right Impression:                    Left Impression:  The common carotid artery is normal. The common carotid artery is normal.   The carotid bulb is normal.          The carotid bulb is normal.   The external carotid artery has a    The external carotid artery has a  smooth calcific plaque. smooth calcific plaque. The internal carotid artery has a    The internal carotid artery has a  smooth calcific plaque causing a     smooth calcific plaque causing a <50%  <50% stenosis based on velocities. stenosis based on velocities. The vertebral artery is patent with  The vertebral artery is patent with  antegrade flow. antegrade flow. Velocities are measured in cm/s ; Diameters are measured in cm Carotid Right Measurements +------------+-------+------+--------+-------+------------+----------------+ ! Location    ! PSV    ! EDV   ! Angle   ! RI     !%Stenosis   ! Tortuosity      ! +------------+-------+------+--------+-------+------------+----------------+ ! Prox CCA    !59.6   ! 10    !        !0.83   !            !                ! +------------+-------+------+--------+-------+------------+----------------+ ! Mid CCA     !52.2   ! 10    !        !0.81   !            !                ! +------------+-------+------+--------+-------+------------+----------------+ ! Dist CCA    !52.2   !11    !        !0.79   !            !                ! +------------+-------+------+--------+-------+------------+----------------+ ! Bulb        !44.7   !11    !        !0.75   ! !                ! +------------+-------+------+--------+-------+------------+----------------+ ! Prox ICA    !37.3   !8     !        !0.79   !            !                ! +------------+-------+------+--------+-------+------------+----------------+ ! Mid ICA     !71     !18    !        !0.74   !            !                ! +------------+-------+------+--------+-------+------------+----------------+ ! Dist ICA    !74.6   !20    !        !0.73   !            !                ! +------------+-------+------+--------+-------+------------+----------------+ ! Prox ECA    !52.8   !5     !        !0.91   !            !                ! +------------+-------+------+--------+-------+------------+----------------+ ! Vertebral   !31.1   !0     !        !1      !            !                ! +------------+-------+------+--------+-------+------------+----------------+   - Additional Measurements:ICAPSV/CCAPSV 1.25. ICAEDV/CCAEDV 2. Carotid Left Measurements +------------+-------+------+--------+-------+------------+----------------+ ! Location    ! PSV    ! EDV   ! Angle   ! RI     !%Stenosis   ! Tortuosity      ! +------------+-------+------+--------+-------+------------+----------------+ ! Prox CCA    !68.3   !13    !        !0.81   !            !                ! +------------+-------+------+--------+-------+------------+----------------+ ! Mid CCA     !82.6   !14    !        !0.83   !            !                ! +------------+-------+------+--------+-------+------------+----------------+ ! Dist CCA    !84.5   !17    !        !0.8    ! !                ! +------------+-------+------+--------+-------+------------+----------------+ ! Bulb        !47.8   ! 10    !        !0.79   !            !                ! +------------+-------+------+--------+-------+------------+----------------+ ! Prox ICA    !75.2   ! 19    !        !0.75   !            !                ! +------------+-------+------+--------+-------+------------+----------------+ ! Mid ICA     !104    !32    !        !0.69   !            !                ! +------------+-------+------+--------+-------+------------+----------------+ ! Dist ICA    ! 97.5   !26    !        !0.73   !            !                ! +------------+-------+------+--------+-------+------------+----------------+ ! Prox ECA    !72.7   !12    !        !0.83   !            !                ! +------------+-------+------+--------+-------+------------+----------------+ ! Vertebral   !34.2   !6     !        !0.82   !            !                ! +------------+-------+------+--------+-------+------------+----------------+   - Additional Measurements:ICAPSV/CCAPSV 1.52. ICAEDV/CCAEDV 2.46. Disposition:   long term care facility    Discharge Instructions: Activity: activity as tolerated  Diet:  regular diet    Follow up with Declan Horvath MD in 1 weeks.     Signed:  Ed Cline MD  10/20/2022, 12:47 PM    Time spent in discharge of this pt is more than 30 minutes in examination,evaluvation,  counseling and review of medication and discharge plan

## 2022-10-20 NOTE — PROGRESS NOTES
NEPHROLOGY PROGRESS NOTE    Patient :  Nila Lewis; 65 y.o. MRN# [id number]  Location:  2069/2069-01  Attending:  Stephie Baird MD  Admit Date:  10/3/2022   Hospital Day: 16    Reason for consultation: Management of acute kidney injury and chronic kidney disease stage IV. Consulting physician: Skip Trinidad MD.    Presenting Complaint: Change in mental status and combative behavior. Interval history: Patient remains confused but stable. IV fluid was increased to 75 mL/h yesterday and urine output has improved. He had a episode where his postvoid residual yesterday was greater than 400 mL but he subsequently wetting his briefs and repeat post void bladder scan showed less than 100 mL. History of Present Illness: This is a 65 y.o. 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 Prudy Osgood with baseline serum creatinine 1.9-2.2 mg/dL. Patient presented to the hospital  on 10/03/2022 with complains of change in mental status more confusion and combative behavior. On admission serum creatinine was noted to be 2.4 mg/dL which  peaked to 3.5 mg/dL on 10/11/2022. He apparently has not been eating or drinking much poor oral intake and appetite but blood pressure has been stable. CT scan of the brain showed no acute intracranial abnormality and chest x-ray Not show pulmonary edema or infiltrates.     Current Medications:    sodium bicarbonate 75 mEq in sodium chloride 0.45 % 1,000 mL infusion, Continuous  sodium zirconium cyclosilicate (LOKELMA) oral suspension 5 g, TID  carvedilol (COREG) tablet 12.5 mg, BID WC  aspirin chewable tablet 81 mg, Daily  Calamine 8-8 % lotion, BID  LORazepam (ATIVAN) injection 0.5 mg, Q10 Min PRN  allopurinol (ZYLOPRIM) tablet 300 mg, Daily  melatonin tablet 9 mg, QPM  tamsulosin (FLOMAX) capsule 0.4 mg, Daily  haloperidol lactate (HALDOL) injection 5 mg, Q8H PRN  QUEtiapine (SEROQUEL) tablet 100 mg, Nightly  galantamine (RAZADYNE) tablet 8 mg, BID WC  sodium chloride flush 0.9 % injection 5-40 mL, 2 times per day  sodium chloride flush 0.9 % injection 5-40 mL, PRN  0.9 % sodium chloride infusion, PRN  [Held by provider] enoxaparin Sodium (LOVENOX) injection 30 mg, Daily  acetaminophen (TYLENOL) tablet 650 mg, Q4H PRN  ondansetron (ZOFRAN-ODT) disintegrating tablet 4 mg, Q8H PRN   Or  ondansetron (ZOFRAN) injection 4 mg, Q6H PRN    Objective:  CURRENT TEMPERATURE:  Temp: 98.8 °F (37.1 °C)  MAXIMUM TEMPERATURE OVER 24HRS:  Temp (24hrs), Av.3 °F (37.4 °C), Min:98.8 °F (37.1 °C), Max:99.7 °F (37.6 °C)    CURRENT RESPIRATORY RATE:  Resp: 18  CURRENT PULSE:  Heart Rate: 73  CURRENT BLOOD PRESSURE:  BP: (!) 179/85  24HR BLOOD PRESSURE RANGE:  Systolic (98TNI), YHE:905 , Min:144 , BPY:196   ; Diastolic (73JFO), EBA:18, Min:73, Max:85    24HR INTAKE/OUTPUT:    Intake/Output Summary (Last 24 hours) at 10/20/2022 0936  Last data filed at 10/20/2022 2653  Gross per 24 hour   Intake 865 ml   Output --   Net 865 ml       Physical Exam:  GENERAL APPEARANCE: Awake and alert but not oriented. HEAD: Normocephalic and atraumatic. EYES: EOMI. Not pale, anicteric   NOSE:  No nasal discharge. THROAT:  Oral cavity and pharynx normal. Moist  CARDIAC: Normal S1 and S2. No S3, S4 or murmurs. Rhythm is regular. LUNGS: Clear to auscultation and percussion without rales, rhonchi, wheezing or diminished breath sounds. ABDOMEN: Full, soft with normal bowel sounds. EXTREMITIES: No edema. Peripheral pulses intact. NEURO: Confused but with no focal signs. Assessment/plan:    1. Acute kidney injury and chronic kidney disease stage IV - acute kidney injury most consistent with prerenal azotemia with no clear evidence of acute urinary retention at this time. Renal function is improving with hydration. Plan: Change IV fluid to D5/0.9 normal saline at 75 mL/h.   Continue to hold Lovenox and spironolactone. Decrease allopurinol to 100 mg p.o. daily as appropriate for his GFR. Basic metabolic profile daily. 2.  Systemic hypertension - Blood pressure control is suboptimal and we will increase carvedilol to 25 mg p.o. twice daily. 3.  Non-anion gap metabolic acidosis - Resolved with bicarbonate drip. Prognosis is guarded.  [facility] discharge is pending insurance approval    Electronically signed by Sharen Aschoff, MD on 10/20/2022 at 9:36 AM

## 2022-10-20 NOTE — CARE COORDINATION
Transportation arranged for patient to go to [address] [place] [address] at 2:00pm via [id number] [place] [address]. Facility informed. Spouse informed. Bedside nurse informed.      Number for Report: [phone]

## 2022-10-20 NOTE — PROGRESS NOTES
Writer call report to nurse at [address] [place] [address] 59 home all questions answered and call back number given to the nurse.

## 2025-06-22 NOTE — PLAN OF CARE
Department of Podiatry  Progress Note    SUBJECTIVE:  Patient is seen at bedside for B/L lower leg cellulitis and wounds. Hospital team at bedside discussing management of care. Vascular studies ordered, plan for compressive dressings to bilateral lower extremities once vascular studies are completed. No other pedal complaints at this time.     OBJECTIVE:    Scheduled Meds:   magnesium oxide  400 mg Oral BID    magnesium sulfate  1,000 mg IntraVENous Once    guaiFENesin  400 mg Oral q8h    budesonide  0.5 mg Nebulization BID RT    arformoterol tartrate  15 mcg Nebulization BID RT    amoxicillin-clavulanate  1 tablet Oral 2 times per day    buprenorphine-naloxone  1 Film SubLINGual BID    miconazole   Topical BID    sodium chloride flush  10 mL IntraVENous 2 times per day    enoxaparin  40 mg SubCUTAneous Daily    atenolol  25 mg Oral BID AC    bisacodyl  5 mg Oral BID    dilTIAZem  240 mg Oral Daily    furosemide  40 mg Oral Lunch    insulin glargine  15 Units SubCUTAneous Nightly    LORazepam  0.5 mg Oral BID    pantoprazole  40 mg Oral QAM AC    polyethylene glycol  17 g Oral Daily    predniSONE  15 mg Oral Daily    tiZANidine  4 mg Oral Q6H    insulin lispro  0-8 Units SubCUTAneous 4x Daily AC & HS     Continuous Infusions:   sodium chloride      dextrose       PRN Meds:.traMADol, ipratropium 0.5 mg-albuterol 2.5 mg, white petrolatum, albuterol, sodium chloride flush, sodium chloride, potassium chloride **OR** potassium alternative oral replacement **OR** potassium chloride, ondansetron **OR** ondansetron, senna, acetaminophen **OR** acetaminophen, glucose, dextrose bolus **OR** dextrose bolus, glucagon (rDNA), dextrose    Allergies   Allergen Reactions    Lyrica [Pregabalin] Anaphylaxis    Other Shortness Of Breath    Sulfa Antibiotics Anaphylaxis     Hallucinations and rash    Darvocet [Propoxyphene N-Acetaminophen]     Ultram [Tramadol Hcl]     Metoprolol Rash       /80   Pulse 79   Temp 97.7 °F (36.5  Problem: Discharge Planning  Goal: Discharge to home or other facility with appropriate resources  10/13/2022 0119 by Tam Siegel RN  Outcome: Progressing  10/12/2022 1938 by Chuy Parra RN  Outcome: Progressing     Problem: Safety - Adult  Goal: Free from fall injury  10/13/2022 0119 by Tam Siegel RN  Outcome: Progressing  10/12/2022 1938 by Chuy Parra RN  Outcome: Progressing  Note: Patient remains free of falls and injuries throughout shift. Bed remains in the lowest position, wheels locked, call light and bedside table are within reach. Problem: Pain  Goal: Verbalizes/displays adequate comfort level or baseline comfort level  10/13/2022 0119 by Tam Siegel RN  Outcome: Progressing  10/12/2022 1938 by Chuy Parra RN  Outcome: Progressing  Note: Assess the location, characteristics, onset, duration, frequency, quality, and severity of pain. Encourage immediate report of pain. Use appropriate pain scale to rate pain. Manage pain using nonpharmacologic/pharmacologic interventions. Problem: ABCDS Injury Assessment  Goal: Absence of physical injury  10/12/2022 1938 by Chuy Parra RN  Outcome: Progressing     Problem: Neurosensory - Adult  Goal: Achieves maximal functionality and self care  10/12/2022 1938 by Chuy Parra RN  Outcome: Progressing     Problem: Skin/Tissue Integrity - Adult  Goal: Skin integrity remains intact  10/12/2022 1938 by Chuy Parra RN  Outcome: Progressing     Problem: Skin/Tissue Integrity  Goal: Absence of new skin breakdown  Description: 1. Monitor for areas of redness and/or skin breakdown  2. Assess vascular access sites hourly  3. Every 4-6 hours minimum:  Change oxygen saturation probe site  4. Every 4-6 hours:  If on nasal continuous positive airway pressure, respiratory therapy assess nares and determine need for appliance change or resting period.   10/12/2022 1938 by Chuy Parra RN  Outcome: Progressing     Problem: Nutrition Deficit:  Goal: Optimize nutritional status  10/12/2022 1938 by Katherin Dias, RN  Outcome: Progressing